# Patient Record
Sex: FEMALE | Race: WHITE | NOT HISPANIC OR LATINO | Employment: OTHER | RURAL
[De-identification: names, ages, dates, MRNs, and addresses within clinical notes are randomized per-mention and may not be internally consistent; named-entity substitution may affect disease eponyms.]

---

## 2017-02-15 ENCOUNTER — OFFICE VISIT (OUTPATIENT)
Dept: FAMILY MEDICINE CLINIC | Facility: CLINIC | Age: 77
End: 2017-02-15

## 2017-02-15 VITALS
HEART RATE: 78 BPM | BODY MASS INDEX: 26.59 KG/M2 | DIASTOLIC BLOOD PRESSURE: 80 MMHG | TEMPERATURE: 97.9 F | HEIGHT: 59 IN | OXYGEN SATURATION: 98 % | WEIGHT: 131.9 LBS | RESPIRATION RATE: 20 BRPM | SYSTOLIC BLOOD PRESSURE: 130 MMHG

## 2017-02-15 DIAGNOSIS — E78.5 HYPERLIPIDEMIA, UNSPECIFIED HYPERLIPIDEMIA TYPE: Chronic | ICD-10-CM

## 2017-02-15 DIAGNOSIS — R42 DIZZINESS: ICD-10-CM

## 2017-02-15 DIAGNOSIS — I10 ESSENTIAL HYPERTENSION: Chronic | ICD-10-CM

## 2017-02-15 DIAGNOSIS — IMO0002 UNCONTROLLED TYPE 2 DIABETES MELLITUS WITH COMPLICATION, WITH LONG-TERM CURRENT USE OF INSULIN: Primary | Chronic | ICD-10-CM

## 2017-02-15 DIAGNOSIS — E55.9 VITAMIN D DEFICIENCY: Chronic | ICD-10-CM

## 2017-02-15 PROCEDURE — 80053 COMPREHEN METABOLIC PANEL: CPT | Performed by: NURSE PRACTITIONER

## 2017-02-15 PROCEDURE — 83036 HEMOGLOBIN GLYCOSYLATED A1C: CPT | Performed by: NURSE PRACTITIONER

## 2017-02-15 PROCEDURE — 99214 OFFICE O/P EST MOD 30 MIN: CPT | Performed by: NURSE PRACTITIONER

## 2017-02-15 RX ORDER — MECLIZINE HYDROCHLORIDE 25 MG/1
12.5-25 TABLET ORAL 3 TIMES DAILY PRN
Qty: 90 TABLET | Refills: 0 | Status: SHIPPED | OUTPATIENT
Start: 2017-02-15

## 2017-02-15 NOTE — PROGRESS NOTES
Subjective   Brenda Garzon is a 76 y.o. female. She presents today for her routine follow up.  Has been doing well since her last visit.  She reports that she has experienced some dizzy spells when she wakes in the morning.  She has had some hypoglycemic episodes as well.    Diabetes   She presents for her follow-up diabetic visit. She has type 2 diabetes mellitus. Her disease course has been stable. Hypoglycemia symptoms include confusion and dizziness. Pertinent negatives for hypoglycemia include no headaches. There are no diabetic associated symptoms. Pertinent negatives for diabetes include no chest pain. There are no hypoglycemic complications. Symptoms are stable. There are no diabetic complications. Risk factors for coronary artery disease include diabetes mellitus, dyslipidemia, family history, post-menopausal, sedentary lifestyle and stress. Current diabetic treatment includes insulin injections and oral agent (dual therapy). She is compliant with treatment most of the time. She has not had a previous visit with a dietitian. She participates in exercise intermittently. An ACE inhibitor/angiotensin II receptor blocker is being taken. Eye exam is not current.        The following portions of the patient's history were reviewed and updated as appropriate: allergies, current medications, past family history, past medical history, past social history, past surgical history and problem list.    Review of Systems   Constitutional: Negative.    Respiratory: Negative.  Negative for shortness of breath.    Cardiovascular: Negative.  Negative for chest pain and palpitations.   Musculoskeletal: Negative for neck pain.   Skin: Negative.    Neurological: Positive for dizziness. Negative for headaches.   Psychiatric/Behavioral: Positive for confusion.       Objective   Physical Exam   Constitutional: She is oriented to person, place, and time. Vital signs are normal. She appears well-developed and well-nourished. No  distress.   HENT:   Head: Normocephalic.   Right Ear: External ear normal.   Left Ear: External ear normal.   Nose: Nose normal.   Mouth/Throat: Oropharynx is clear and moist.   Eyes: EOM are normal. Pupils are equal, round, and reactive to light.   Neck: Normal range of motion. Neck supple. No JVD present. No thyromegaly present.   Cardiovascular: Normal rate and regular rhythm.  Exam reveals no gallop and no friction rub.    No murmur heard.  Pulmonary/Chest: Effort normal and breath sounds normal. No respiratory distress. She has no wheezes. She has no rales.   Musculoskeletal: Normal range of motion.   Neurological: She is alert and oriented to person, place, and time.   Skin: Skin is warm and dry. No rash noted. She is not diaphoretic. No erythema. No pallor.   Psychiatric: She has a normal mood and affect. Her behavior is normal. Judgment and thought content normal.   Nursing note and vitals reviewed.      Assessment/Plan   Brenda was seen today for follow-up and diabetes.    Diagnoses and all orders for this visit:    Uncontrolled type 2 diabetes mellitus with complication, with long-term current use of insulin  -     Comprehensive Metabolic Panel  -     Hemoglobin A1c    Essential hypertension    Hyperlipidemia, unspecified hyperlipidemia type    Vitamin D deficiency    Dizziness  -     meclizine (ANTIVERT) 25 MG tablet; Take 0.5-1 tablets by mouth 3 (Three) Times a Day As Needed for dizziness.    Lab following office visit.  Antivert PRN dizziness.  Continue all current medications.  Follow up in 3 months for routine follow up.  Follow up sooner for problems/concerns.  Patient verbalized understanding and agreement with plan of care.

## 2017-02-16 LAB
ALBUMIN SERPL-MCNC: 3.8 G/DL (ref 3.4–4.8)
ALBUMIN/GLOB SERPL: 1.2 G/DL (ref 1.1–1.8)
ALP SERPL-CCNC: 101 U/L (ref 38–126)
ALT SERPL W P-5'-P-CCNC: 30 U/L (ref 9–52)
ANION GAP SERPL CALCULATED.3IONS-SCNC: 8 MMOL/L (ref 5–15)
AST SERPL-CCNC: 28 U/L (ref 14–36)
BILIRUB SERPL-MCNC: 0.5 MG/DL (ref 0.2–1.3)
BUN BLD-MCNC: 32 MG/DL (ref 7–21)
BUN/CREAT SERPL: 25.2 (ref 7–25)
CALCIUM SPEC-SCNC: 9.6 MG/DL (ref 8.4–10.2)
CHLORIDE SERPL-SCNC: 104 MMOL/L (ref 95–110)
CO2 SERPL-SCNC: 27 MMOL/L (ref 22–31)
CREAT BLD-MCNC: 1.27 MG/DL (ref 0.5–1)
GFR SERPL CREATININE-BSD FRML MDRD: 41 ML/MIN/1.73 (ref 39–90)
GLOBULIN UR ELPH-MCNC: 3.2 GM/DL (ref 2.3–3.5)
GLUCOSE BLD-MCNC: 60 MG/DL (ref 60–100)
HBA1C MFR BLD: 9.41 % (ref 4–5.6)
POTASSIUM BLD-SCNC: 4.8 MMOL/L (ref 3.5–5.1)
PROT SERPL-MCNC: 7 G/DL (ref 6.3–8.6)
SODIUM BLD-SCNC: 139 MMOL/L (ref 137–145)

## 2017-02-22 ENCOUNTER — TELEPHONE (OUTPATIENT)
Dept: FAMILY MEDICINE CLINIC | Facility: CLINIC | Age: 77
End: 2017-02-22

## 2017-02-22 RX ORDER — INSULIN GLARGINE 100 [IU]/ML
INJECTION, SOLUTION SUBCUTANEOUS
Qty: 45 ML | Refills: 1 | Status: SHIPPED | OUTPATIENT
Start: 2017-02-22 | End: 2017-08-01 | Stop reason: SDUPTHER

## 2017-02-22 NOTE — TELEPHONE ENCOUNTER
----- Message from CHEYENNE Maki sent at 2/16/2017  5:18 PM CST -----  Her A1C is 9.4 which indicates her diabetes is not controlled.  She indicated in the office that she was not taking her Janumet.  She really needs to be taking this.  Her last A1C was 7.5, so something has drastically changed.

## 2017-02-24 ENCOUNTER — TELEPHONE (OUTPATIENT)
Dept: FAMILY MEDICINE CLINIC | Facility: CLINIC | Age: 77
End: 2017-02-24

## 2017-02-27 ENCOUNTER — TELEPHONE (OUTPATIENT)
Dept: FAMILY MEDICINE CLINIC | Facility: CLINIC | Age: 77
End: 2017-02-27

## 2017-03-14 ENCOUNTER — TELEPHONE (OUTPATIENT)
Dept: FAMILY MEDICINE CLINIC | Facility: CLINIC | Age: 77
End: 2017-03-14

## 2017-03-14 DIAGNOSIS — R30.0 DYSURIA: Primary | ICD-10-CM

## 2017-03-14 NOTE — TELEPHONE ENCOUNTER
----- Message from CHEYENNE Maki sent at 3/14/2017 10:50 AM CDT -----  She needs to go to the lab and provide a urine specimen.  ----- Message -----     From: Ana Man MA     Sent: 3/14/2017   9:34 AM       To: CHEYENNE Maki    Pt called stated that she needs something for a bladder infection pain with urination. I informed the Pt that she may need to come in for a visit if needed.

## 2017-03-16 ENCOUNTER — TELEPHONE (OUTPATIENT)
Dept: FAMILY MEDICINE CLINIC | Facility: CLINIC | Age: 77
End: 2017-03-16

## 2017-03-16 DIAGNOSIS — N39.0 ACUTE UTI: Primary | ICD-10-CM

## 2017-03-16 LAB
BILIRUB UR QL STRIP: NEGATIVE
CLARITY UR: ABNORMAL
COLOR UR: ABNORMAL
GLUCOSE UR STRIP-MCNC: NEGATIVE MG/DL
HGB UR QL STRIP.AUTO: NEGATIVE
KETONES UR QL STRIP: NEGATIVE
LEUKOCYTE ESTERASE UR QL STRIP.AUTO: ABNORMAL
NITRITE UR QL STRIP: NEGATIVE
PH UR STRIP.AUTO: 5 [PH] (ref 5–8)
PROT UR QL STRIP: ABNORMAL
SP GR UR STRIP: 1.01 (ref 1–1.03)
UROBILINOGEN UR QL STRIP: ABNORMAL

## 2017-03-16 PROCEDURE — 87086 URINE CULTURE/COLONY COUNT: CPT | Performed by: NURSE PRACTITIONER

## 2017-03-16 PROCEDURE — 81003 URINALYSIS AUTO W/O SCOPE: CPT | Performed by: NURSE PRACTITIONER

## 2017-03-16 RX ORDER — SULFAMETHOXAZOLE AND TRIMETHOPRIM 400; 80 MG/1; MG/1
1 TABLET ORAL 2 TIMES DAILY
Qty: 14 TABLET | Refills: 0 | Status: SHIPPED | OUTPATIENT
Start: 2017-03-16 | End: 2017-03-23

## 2017-03-16 NOTE — TELEPHONE ENCOUNTER
----- Message from CHEYENNE Maki sent at 3/16/2017 11:04 AM CDT -----  She has a UTI.  Will send an antibiotic in for her.

## 2017-03-17 LAB — BACTERIA SPEC AEROBE CULT: NORMAL

## 2017-05-02 RX ORDER — LANCETS
EACH MISCELLANEOUS
Qty: 200 EACH | Refills: 3 | Status: SHIPPED | OUTPATIENT
Start: 2017-05-02 | End: 2018-04-23 | Stop reason: SDUPTHER

## 2017-05-02 RX ORDER — BLOOD SUGAR DIAGNOSTIC
STRIP MISCELLANEOUS
Qty: 200 EACH | Refills: 3 | Status: SHIPPED | OUTPATIENT
Start: 2017-05-02 | End: 2018-04-23 | Stop reason: SDUPTHER

## 2017-05-17 ENCOUNTER — OFFICE VISIT (OUTPATIENT)
Dept: FAMILY MEDICINE CLINIC | Facility: CLINIC | Age: 77
End: 2017-05-17

## 2017-05-17 VITALS
SYSTOLIC BLOOD PRESSURE: 140 MMHG | BODY MASS INDEX: 25.06 KG/M2 | TEMPERATURE: 98.7 F | WEIGHT: 124.3 LBS | RESPIRATION RATE: 20 BRPM | DIASTOLIC BLOOD PRESSURE: 80 MMHG | HEART RATE: 86 BPM | HEIGHT: 59 IN | OXYGEN SATURATION: 97 %

## 2017-05-17 DIAGNOSIS — R07.9 CHEST PAIN AT REST: Primary | ICD-10-CM

## 2017-05-17 DIAGNOSIS — I49.9 IRREGULAR HEART BEAT: ICD-10-CM

## 2017-05-17 DIAGNOSIS — I10 ESSENTIAL HYPERTENSION: Chronic | ICD-10-CM

## 2017-05-17 DIAGNOSIS — E55.9 VITAMIN D DEFICIENCY: Chronic | ICD-10-CM

## 2017-05-17 DIAGNOSIS — E78.5 DYSLIPIDEMIA: Chronic | ICD-10-CM

## 2017-05-17 DIAGNOSIS — IMO0002 UNCONTROLLED TYPE 2 DIABETES MELLITUS WITH COMPLICATION, WITH LONG-TERM CURRENT USE OF INSULIN: Chronic | ICD-10-CM

## 2017-05-17 DIAGNOSIS — Z12.31 ENCOUNTER FOR SCREENING MAMMOGRAM FOR BREAST CANCER: ICD-10-CM

## 2017-05-17 PROCEDURE — 93010 ELECTROCARDIOGRAM REPORT: CPT | Performed by: INTERNAL MEDICINE

## 2017-05-17 PROCEDURE — 99214 OFFICE O/P EST MOD 30 MIN: CPT | Performed by: NURSE PRACTITIONER

## 2017-05-17 PROCEDURE — 93005 ELECTROCARDIOGRAM TRACING: CPT | Performed by: NURSE PRACTITIONER

## 2017-05-22 ENCOUNTER — TELEPHONE (OUTPATIENT)
Dept: FAMILY MEDICINE CLINIC | Facility: CLINIC | Age: 77
End: 2017-05-22

## 2017-06-13 ENCOUNTER — OFFICE VISIT (OUTPATIENT)
Dept: FAMILY MEDICINE CLINIC | Facility: CLINIC | Age: 77
End: 2017-06-13

## 2017-06-13 VITALS
DIASTOLIC BLOOD PRESSURE: 70 MMHG | SYSTOLIC BLOOD PRESSURE: 140 MMHG | TEMPERATURE: 98.9 F | BODY MASS INDEX: 25.34 KG/M2 | WEIGHT: 125.7 LBS | OXYGEN SATURATION: 98 % | HEART RATE: 78 BPM | HEIGHT: 59 IN | RESPIRATION RATE: 20 BRPM

## 2017-06-13 DIAGNOSIS — Z78.0 POSTMENOPAUSAL STATE: Chronic | ICD-10-CM

## 2017-06-13 DIAGNOSIS — E55.9 VITAMIN D DEFICIENCY: Chronic | ICD-10-CM

## 2017-06-13 DIAGNOSIS — IMO0002 UNCONTROLLED TYPE 2 DIABETES MELLITUS WITH COMPLICATION, WITH LONG-TERM CURRENT USE OF INSULIN: Chronic | ICD-10-CM

## 2017-06-13 DIAGNOSIS — I10 ESSENTIAL HYPERTENSION: Primary | Chronic | ICD-10-CM

## 2017-06-13 DIAGNOSIS — E78.5 DYSLIPIDEMIA: Chronic | ICD-10-CM

## 2017-06-13 PROCEDURE — 99213 OFFICE O/P EST LOW 20 MIN: CPT | Performed by: NURSE PRACTITIONER

## 2017-06-13 NOTE — PROGRESS NOTES
Subjective   Brenda Garzon is a 76 y.o. female. She presents today for her routine follow up on chronic medical problems.  Reports that she has been doing well since her last visit.  BP is well controlled.    Hypertension   This is a chronic problem. The current episode started more than 1 year ago. The problem has been resolved since onset. The problem is controlled. Pertinent negatives include no anxiety, blurred vision, chest pain, headaches, malaise/fatigue, neck pain, orthopnea, palpitations, peripheral edema, PND, shortness of breath or sweats. Risk factors for coronary artery disease include diabetes mellitus, dyslipidemia, family history, obesity, sedentary lifestyle and post-menopausal state. Past treatments include angiotensin blockers. The current treatment provides significant improvement. Compliance problems include diet and exercise.         The following portions of the patient's history were reviewed and updated as appropriate: allergies, current medications, past family history, past medical history, past social history, past surgical history and problem list.    Review of Systems   Constitutional: Negative.  Negative for malaise/fatigue.   Eyes: Negative for blurred vision.   Respiratory: Negative.  Negative for shortness of breath.    Cardiovascular: Negative.  Negative for chest pain, palpitations, orthopnea and PND.   Musculoskeletal: Negative for neck pain.   Skin: Negative.    Neurological: Negative for headaches.       Objective   Physical Exam   Constitutional: She is oriented to person, place, and time. Vital signs are normal. She appears well-developed and well-nourished. No distress.   HENT:   Head: Normocephalic.   Right Ear: External ear normal.   Left Ear: External ear normal.   Nose: Nose normal.   Mouth/Throat: Oropharynx is clear and moist.   Eyes: EOM are normal. Pupils are equal, round, and reactive to light.   Neck: Normal range of motion. Neck supple. No JVD present. No  thyromegaly present.   Cardiovascular: Normal rate and regular rhythm.  Exam reveals no gallop and no friction rub.    No murmur heard.  Pulmonary/Chest: Effort normal and breath sounds normal. No respiratory distress. She has no wheezes. She has no rales.   Musculoskeletal: Normal range of motion.   Neurological: She is alert and oriented to person, place, and time.   Skin: Skin is warm and dry. No rash noted. She is not diaphoretic. No erythema. No pallor.   Psychiatric: She has a normal mood and affect. Her behavior is normal. Judgment and thought content normal.   Nursing note and vitals reviewed.      Assessment/Plan   Brneda was seen today for follow-up and diabetes.    Diagnoses and all orders for this visit:    Essential hypertension    Dyslipidemia    Postmenopausal state    Uncontrolled type 2 diabetes mellitus with complication, with long-term current use of insulin    Vitamin D deficiency  Continue current medications.  Follow up in 3 months for routine follow up.  Follow up sooner for problems/concerns.  Patient verbalized understanding and agreement with plan of care.

## 2017-06-26 ENCOUNTER — TELEPHONE (OUTPATIENT)
Dept: FAMILY MEDICINE CLINIC | Facility: CLINIC | Age: 77
End: 2017-06-26

## 2017-07-14 ENCOUNTER — OFFICE VISIT (OUTPATIENT)
Dept: CARDIOLOGY | Facility: CLINIC | Age: 77
End: 2017-07-14

## 2017-07-14 ENCOUNTER — APPOINTMENT (OUTPATIENT)
Dept: LAB | Facility: HOSPITAL | Age: 77
End: 2017-07-14

## 2017-07-14 VITALS
HEIGHT: 59 IN | WEIGHT: 125.5 LBS | BODY MASS INDEX: 25.3 KG/M2 | SYSTOLIC BLOOD PRESSURE: 160 MMHG | HEART RATE: 85 BPM | OXYGEN SATURATION: 99 % | DIASTOLIC BLOOD PRESSURE: 78 MMHG

## 2017-07-14 DIAGNOSIS — E78.2 MIXED HYPERLIPIDEMIA: ICD-10-CM

## 2017-07-14 DIAGNOSIS — R06.09 DYSPNEA ON EXERTION: ICD-10-CM

## 2017-07-14 DIAGNOSIS — R07.2 PRECORDIAL PAIN: Primary | ICD-10-CM

## 2017-07-14 DIAGNOSIS — R00.2 PALPITATIONS: ICD-10-CM

## 2017-07-14 LAB
DEPRECATED RDW RBC AUTO: 45.5 FL (ref 36.4–46.3)
ERYTHROCYTE [DISTWIDTH] IN BLOOD BY AUTOMATED COUNT: 15.4 % (ref 11.5–14.5)
HCT VFR BLD AUTO: 32.4 % (ref 35–45)
HGB BLD-MCNC: 10.9 G/DL (ref 12–15.5)
MCH RBC QN AUTO: 27 PG (ref 26.5–34)
MCHC RBC AUTO-ENTMCNC: 33.6 G/DL (ref 31.4–36)
MCV RBC AUTO: 80.4 FL (ref 80–98)
NT-PROBNP SERPL-MCNC: 238 PG/ML (ref 0–1800)
PLATELET # BLD AUTO: 278 10*3/MM3 (ref 150–450)
PMV BLD AUTO: 11.9 FL (ref 8–12)
RBC # BLD AUTO: 4.03 10*6/MM3 (ref 3.77–5.16)
WBC NRBC COR # BLD: 7.75 10*3/MM3 (ref 3.2–9.8)

## 2017-07-14 PROCEDURE — 85027 COMPLETE CBC AUTOMATED: CPT | Performed by: INTERNAL MEDICINE

## 2017-07-14 PROCEDURE — 99204 OFFICE O/P NEW MOD 45 MIN: CPT | Performed by: INTERNAL MEDICINE

## 2017-07-14 PROCEDURE — 36415 COLL VENOUS BLD VENIPUNCTURE: CPT | Performed by: INTERNAL MEDICINE

## 2017-07-14 PROCEDURE — 83880 ASSAY OF NATRIURETIC PEPTIDE: CPT | Performed by: INTERNAL MEDICINE

## 2017-07-14 NOTE — PROGRESS NOTES
Cardiovascular Medicine      Tomer Yousif M.D., Ph.D., [facility]         Jodie Aguilera, APRN  [address]  [place] [address]  Thank you for asking me to see Brenda Garzon for chest pain and palpitations.    History of Present Illness  This is a 76 y.o. female with:    1. CP  2. Palpitations  3.  Hypertension  4.  Hyperlipidemia  5.  Type 2 diabetes  6. Dyspnea    Chest pain/Dyspnea/Palpitations  Patient's referred by her PCP for increased cardiac awareness and chest pain complaints.  The patient recently had a period of non--exertional chest pain.  She states she had a chest pain that occurred ball she was sitting in her car.  There was no exertional complaints.  There is a family history of MI in her father, but not premature.  She denies any personal history of MI, CHF or known coronary artery disease.  She tells me that she's never had an ischemia evaluation.  She has no exertional chest pain complaints.  She's had no other associated symptoms except intermittent palpitations.  She describes these as sensations of irregularity.  No sustained episodes.  No aggravating or alleviating factors.  She's had no dizziness, lightheadedness or effort syncope. She also tells me she has more fatigue than normal and dyspnea. The dyspnea is exertional. No resting symptoms. She has a non-productive cough. She is a lifelong non-smoker, but she has extensive secondhand smoke exposure.     HLD  Concerning the patient's hyperlipidemia, the patient remains on a statin.  They are tolerating this very well.  Denies side effects.  Specifically, the patient denies any prior history of asymptomatic LFT elevation, myositis or myalgias.  Patient's laboratory evaluations are followed closely by their PCP.      Review of Systems - General ROS: negative  Respiratory ROS: positive for - cough  Cardiovascular ROS: positive for - chest pain and palpitations.  All other systems were reviewed and were negative.    family  history includes Diabetes in her brother and sister; Heart attack in her father; Heart disease in her father; Hypertension in her mother; Stroke in her mother.     reports that she has never smoked. She has never used smokeless tobacco. She reports that she does not drink alcohol or use illicit drugs.    Allergies   Allergen Reactions   • Codeine          Current Outpatient Prescriptions:   •  ACCU-CHEK YUSRA PLUS test strip, Use as directed, Disp: 200 each, Rfl: 3  •  ACCU-CHEK FASTCLIX LANCETS misc, Use as directed, Disp: 200 each, Rfl: 3  •  aspirin 81 MG tablet, Take 81 mg by mouth daily., Disp: , Rfl:   •  atorvastatin (LIPITOR) 20 MG tablet, Take 1 tablet by mouth Every Night., Disp: 90 tablet, Rfl: 3  •  diclofenac (VOLTAREN) 75 MG EC tablet, Take 1 tablet by mouth 2 (Two) Times a Day., Disp: 180 tablet, Rfl: 3  •  Ergocalciferol (VITAMIN D2 PO), Take 50,000 Units by mouth 1 (one) time per week., Disp: , Rfl:   •  gabapentin (NEURONTIN) 300 MG capsule, Take 1 capsule by mouth Every Night., Disp: 90 capsule, Rfl: 3  •  LANTUS SOLOSTAR 100 UNIT/ML injection pen, Inject subcutaneously 40  units at bedtime, Disp: 45 mL, Rfl: 1  •  losartan (COZAAR) 50 MG tablet, Take 1 tablet by mouth 2 (Two) Times a Day., Disp: 180 tablet, Rfl: 3  •  meclizine (ANTIVERT) 25 MG tablet, Take 0.5-1 tablets by mouth 3 (Three) Times a Day As Needed for dizziness., Disp: 90 tablet, Rfl: 0  •  metaxalone (SKELAXIN) 800 MG tablet, Take 400-800 mg by mouth 3 (three) times a day as needed for muscle spasms., Disp: , Rfl:   •  omeprazole (priLOSEC) 40 MG capsule, Take 1 capsule by mouth Daily., Disp: 90 capsule, Rfl: 3  •  sitaGLIPtin-metFORMIN (JANUMET)  MG per tablet, Take 1 tablet by mouth 2 (Two) Times a Day., Disp: 180 tablet, Rfl: 3    Physical Exam:  Vitals:    07/14/17 1023   BP: 160/78   Pulse:    SpO2:      Body mass index is 25.35 kg/(m^2).    GEN: alert, appears stated age and cooperative  Body Habitus:  well-nourished  Neuro: CN II-XII grossly intact.   HEENT: Head: Normocephalic, no lesions, without obvious abnormality.  Neck / Thyroid: Supple, no masses, nodes, nodules or enlargement. No arcus senilis, xanthelasma or xanthomas. PERRL. Normal external ears. No drainage. No thyromegaly. Neck supple. No LAD. Trachea midline. Nose, normal.  JVP: 6 cm of water at 45 degrees HJR: absent      Carotid:  Upstroke: easily palpated bilaterally Volume: Normal.    Carotid Bruit:  None  Subclavian Bruit: Not present.    Lymph: No overt LAD.   Back: Normal.  Chest:  Normal Excursion: Good    I:E: Good  Pulmonary:clear to auscultation, no wheezes, rales or rhonchi, symmetric air entry. Equal chest excursion. Chest physical exam is normal. No tenderness.        Precordium:  No palpable heaves or thrusts. P2 is not palpable.   [place]:  normal size and placement Palpable S4: Not present.   Heart rate: normal  Heart Rhythm: regular     Heart Sounds: S1: normal intensity  S2: increased A2  S3: absent   S4: absent  Opening Snap: absent  A2-OS:  N/A  Pericardial rub: absent    Ejection click: None      Murmurs: Systolic: none  Diastolic: none  Abdomen: Soft, non-tender, normal bowel sounds; no bruits, organomegaly or masses.  Extremity: no edema, cyanosis  Pulses: Right radial artery has 2+ (normal) pulse and Left radial artery has 2+ (normal) pulse    DATA REVIEWED:   EKG dated May 17, 2017 shows normal sinus mechanism    EXAM: Radiograph(s), Chest   VIEWS: PA / Lat ; 2   DATE/TIME: 5/17/2017 3:18 PM CDT       INDICATION: Chest pain   COMPARISON: CXR: none      FINDINGS:       - lines/tubes: none   - cardiac: size within normal limits   - mediastinum: contour within normal limits   - lungs: no focal air space process, pulmonary  interstitial edema, nodule(s)/mass   - pleura: no evidence of fluid   - osseous: unremarkable for age   - misc.: Moderate-sized hiatal hernia.          IMPRESSION:  CONCLUSION:       1. No evidence of an acute  cardiopulmonary process.       Hemoglobin A1C 4 - 5.6 % 9.41 (H)     Total Cholesterol 0 - 199 mg/dl 194   Comments: CHOL DESIRED: < 200 MG/DL   Triglycerides 20 - 199 mg/dl 161   Comments: TRIG DESIRED: < 200 MG/DL   HDL Cholesterol 60 - 200 mg/dl 36 (L)   Comments: HDL AVERAGE RISK: 35 - 60 MG/DL   LDL Cholesterol  0 - 129 mg/dl 126   Comments: Calculated LDL results only valid if Triglycerides are < 400 mg/dL.   LDL DESIRED: < 130 MG/DL      Resulting Agency  [facility]     TTE, 2009: normal  MPS, 2009: low-risk    Assessment/Plan     1. Chest pain syndrome and dyspnea. The pain complaints are atypical.  The patient's risk factors for ASCAD include: Diabetes Mellitus, Hyperlipidemia, Age, HTN, [place] Risk Score > 20%. The patient is Moderate Risk.  An ischemia evaluation is indicated. The patient is not able to exercise. Reason for inability to exercise: respiratory condition(s): exertional dyspnea. EKG is Normal.   -Risks/Benefits discussed.   -A hand out was provided on the type of stress test. Questions answered.   -I have asked the patient to call 911 or be seen in the ED for further CP complaints.   -[facility], TTE.  -PFTs and pulm referral    2. Palpitations, uncertain etiology at this time, warranting further investigation. Will need to exclude a significant arrhythmia.  -Discussed the benign nature of the majority of causes of palpitations.   -TTE to evaluate for structural heart disease  -Holter monitor 24 hours    3. Cardiac Risk Assessment and need for statin therapy: Moderate Risk.   -Statin:  Yes.  -Lipid-lowering medications: Lipitor (atorvastatin)  -Recommended ASA    4. Tobacco status: Never smoker.    5. HTN, essential.  BP is not at goal  - follow-up PCP for mgmt.    Plan for follow-up: 1 month          This document has been electronically signed by Tomer Yousif MD PhD on July 14, 2017 10:27 AM

## 2017-07-28 ENCOUNTER — HOSPITAL ENCOUNTER (OUTPATIENT)
Dept: PULMONOLOGY | Facility: HOSPITAL | Age: 77
Discharge: HOME | End: 2017-07-28
Attending: INTERNAL MEDICINE | Admitting: INTERNAL MEDICINE

## 2017-07-28 PROCEDURE — 94727 GAS DIL/WSHOT DETER LNG VOL: CPT

## 2017-07-28 PROCEDURE — 94010 BREATHING CAPACITY TEST: CPT | Performed by: INTERNAL MEDICINE

## 2017-07-28 PROCEDURE — 94729 DIFFUSING CAPACITY: CPT | Performed by: INTERNAL MEDICINE

## 2017-07-28 PROCEDURE — 94729 DIFFUSING CAPACITY: CPT

## 2017-07-28 PROCEDURE — 94727 GAS DIL/WSHOT DETER LNG VOL: CPT | Performed by: INTERNAL MEDICINE

## 2017-07-28 PROCEDURE — 94010 BREATHING CAPACITY TEST: CPT

## 2017-08-01 RX ORDER — INSULIN GLARGINE 100 [IU]/ML
INJECTION, SOLUTION SUBCUTANEOUS
Qty: 45 ML | Refills: 0 | Status: SHIPPED | OUTPATIENT
Start: 2017-08-01 | End: 2017-08-22 | Stop reason: SDUPTHER

## 2017-08-03 ENCOUNTER — OFFICE VISIT (OUTPATIENT)
Dept: PULMONOLOGY | Facility: CLINIC | Age: 77
End: 2017-08-03

## 2017-08-03 VITALS
HEART RATE: 51 BPM | DIASTOLIC BLOOD PRESSURE: 80 MMHG | OXYGEN SATURATION: 98 % | SYSTOLIC BLOOD PRESSURE: 180 MMHG | HEIGHT: 59 IN | BODY MASS INDEX: 25.96 KG/M2 | WEIGHT: 128.8 LBS

## 2017-08-03 DIAGNOSIS — I10 ESSENTIAL HYPERTENSION: Chronic | ICD-10-CM

## 2017-08-03 DIAGNOSIS — K21.9 GASTROESOPHAGEAL REFLUX DISEASE, ESOPHAGITIS PRESENCE NOT SPECIFIED: ICD-10-CM

## 2017-08-03 DIAGNOSIS — R94.2 ABNORMAL DIFFUSION CAPACITY DETERMINED BY PULMONARY FUNCTION TEST: ICD-10-CM

## 2017-08-03 DIAGNOSIS — R00.1 BRADYCARDIA WITH 51-60 BEATS PER MINUTE: ICD-10-CM

## 2017-08-03 DIAGNOSIS — R06.09 DYSPNEA ON EXERTION: Primary | ICD-10-CM

## 2017-08-03 PROBLEM — E78.5 HYPERLIPIDEMIA: Status: ACTIVE | Noted: 2017-08-03

## 2017-08-03 PROCEDURE — 99204 OFFICE O/P NEW MOD 45 MIN: CPT | Performed by: INTERNAL MEDICINE

## 2017-08-03 NOTE — PROGRESS NOTES
Pulmonary Consultation    Subjective     Chief Complaint   Patient presents with   • Shortness of Breath     ref by Nica        History of Present Illness  Brenda Garzon is a 76 y.o. female with a PMH significant for HTN, HLD, and DM who presents for evaluation of dyspnea. Pt states she was having chest pain and was evaluated by Dr. Yousif who referred her to me. She denies dyspnea currently, but states she was having GUADALUPE when she would walk in her back yard last month. Her dyspnea has now resolved. She denies any triggers around that time. Pt denies recurrent chest pain. She reports she was having left ear pain and had a presyncopal episodes. She denies prior diagnosis of lung disease. Pt denies wt changes or edema. She does have issues with GERD which is not full controlled with prilosec. Pt occasionally has seasonal allergies, abut her symptoms have resolved. Pt is a never smoker, but she was exposed to 2nd hand smoke. She previously worked in a chicken house and as a . Her cousin has lung disease and was told he needed a lung transplant. Her daughter also has what sounds like COPD.     Review of Systems: History obtained from chart review and the patient.  Review of Systems   Constitutional: Negative for unexpected weight change.   Respiratory: Negative for cough, shortness of breath and wheezing.    Cardiovascular: Negative for chest pain.   Gastrointestinal:        Heartburn     As described in the HPI. Otherwise, remainder of ROS (14 systems) were negative.    Patient Active Problem List   Diagnosis   • Essential hypertension   • Vitamin D deficiency   • Type 2 diabetes mellitus, uncontrolled   • Thyroid nodule   • Colon polyp   • Postmenopausal state   • Dyslipidemia   • Hyperlipidemia         Current Outpatient Prescriptions:   •  ACCU-CHEK YUSRA PLUS test strip, Use as directed, Disp: 200 each, Rfl: 3  •  ACCU-CHEK FASTCLIX LANCETS misc, Use as directed, Disp: 200 each, Rfl: 3  •   aspirin 81 MG tablet, Take 81 mg by mouth daily., Disp: , Rfl:   •  atorvastatin (LIPITOR) 20 MG tablet, Take 1 tablet by mouth Every Night., Disp: 90 tablet, Rfl: 3  •  diclofenac (VOLTAREN) 75 MG EC tablet, Take 1 tablet by mouth 2 (Two) Times a Day., Disp: 180 tablet, Rfl: 3  •  Ergocalciferol (VITAMIN D2 PO), Take 50,000 Units by mouth 1 (one) time per week., Disp: , Rfl:   •  gabapentin (NEURONTIN) 300 MG capsule, Take 1 capsule by mouth Every Night., Disp: 90 capsule, Rfl: 3  •  LANTUS SOLOSTAR 100 UNIT/ML injection pen, Inject subcutaneously 40  units at bedtime, Disp: 45 mL, Rfl: 0  •  losartan (COZAAR) 50 MG tablet, Take 1 tablet by mouth 2 (Two) Times a Day., Disp: 180 tablet, Rfl: 3  •  meclizine (ANTIVERT) 25 MG tablet, Take 0.5-1 tablets by mouth 3 (Three) Times a Day As Needed for dizziness., Disp: 90 tablet, Rfl: 0  •  metaxalone (SKELAXIN) 800 MG tablet, Take 400-800 mg by mouth 3 (three) times a day as needed for muscle spasms., Disp: , Rfl:   •  omeprazole (priLOSEC) 40 MG capsule, Take 1 capsule by mouth Daily., Disp: 90 capsule, Rfl: 3  •  sitaGLIPtin-metFORMIN (JANUMET)  MG per tablet, Take 1 tablet by mouth 2 (Two) Times a Day., Disp: 180 tablet, Rfl: 3    Past Medical History:   Diagnosis Date   • Chest pain    • Essential hypertension    • Essential hypertension     unspecified   • GERD (gastroesophageal reflux disease)    • Hiatal hernia    • Hyperlipidemia    • Hypertensive disorder    • Irregular heartbeat    • Multinodular goiter    • Neck pain    • Neurologic disorder associated with diabetes mellitus    • Postmenopausal state    • Thyroid nodule    • Type 2 diabetes mellitus    • Type 2 diabetes mellitus, uncontrolled    • Urinary tract infectious disease    • Vitamin D deficiency      Past Surgical History:   Procedure Laterality Date   • COLONOSCOPY  11/08/2013    1 polyp in ascending colon; removed by snare cautery polypectomy. Internal & external hemorrhoids found.   • LEG  "SURGERY Right    • PARTIAL HYSTERECTOMY     • UPPER GASTROINTESTINAL ENDOSCOPY  11/08/2013    Esophageal stricture present. Dilatation performed. Hiatus hernia in esophaugs. Gastritis in stomach. Biopsy taken. Normal duodenum. Biopsy taken.     Social History     Social History   • Marital status:      Spouse name: N/A   • Number of children: N/A   • Years of education: N/A     Social History Main Topics   • Smoking status: Never Smoker   • Smokeless tobacco: Never Used   • Alcohol use No   • Drug use: No   • Sexual activity: Defer     Other Topics Concern   • None     Social History Narrative     Family History   Problem Relation Age of Onset   • Hypertension Mother    • Stroke Mother    • Heart attack Father    • Heart disease Father    • Diabetes Sister    • Diabetes Brother           Objective     Blood pressure 180/80, pulse 51, height 59\" (149.9 cm), weight 128 lb 12.8 oz (58.4 kg), SpO2 98 %, not currently breastfeeding.  Physical Exam   Constitutional: She is oriented to person, place, and time. Vital signs are normal. She appears well-developed and well-nourished.   HENT:   Head: Normocephalic and atraumatic.   Nose: Nose normal.   Mouth/Throat: Uvula is midline, oropharynx is clear and moist and mucous membranes are normal.   Mallampati 3   Eyes: Conjunctivae, EOM and lids are normal. Pupils are equal, round, and reactive to light.   Neck: Trachea normal and normal range of motion. No tracheal tenderness present. No thyroid mass present.   Cardiovascular: Regular rhythm and normal heart sounds.  Bradycardia present.  Exam reveals no gallop.    No murmur heard.  Pulmonary/Chest: Effort normal and breath sounds normal. She has no decreased breath sounds. She has no wheezes. She has no rhonchi. Chest wall is not dull to percussion. She exhibits deformity (kyphosis). She exhibits no tenderness.   Abdominal: Soft. Normal appearance and bowel sounds are normal. There is no tenderness.       Vascular " Status -  Her exam exhibits no right foot edema. Her exam exhibits no left foot edema.  Lymphadenopathy:        Head (right side): No submandibular adenopathy present.        Head (left side): No submandibular adenopathy present.     She has no cervical adenopathy.        Right: No supraclavicular adenopathy present.        Left: No supraclavicular adenopathy present.   Neurological: She is alert and oriented to person, place, and time.   Skin: Skin is warm and dry. No cyanosis. Nails show no clubbing.   Psychiatric: She has a normal mood and affect. Her speech is normal and behavior is normal. Judgment normal.   Nursing note and vitals reviewed.      PFTs: 7/28/17  Ratio 80  FVC 2/88%  FEV1 1.61/90%  TLC 3.48/92%  DLCO 9.2/59%  Normal spirometry and lung volumes.  Moderately reduced diffusing capacity.  No comparative data available.     Radiology (independently reviewed and interpreted by me): CXR 5/17/17 showed NACPD.       Assessment/Plan     Brenda was seen today for shortness of breath.    Diagnoses and all orders for this visit:    Dyspnea on exertion    Abnormal diffusion capacity determined by pulmonary function test    Essential hypertension    Bradycardia with 51-60 beats per minute    Gastroesophageal reflux disease, esophagitis presence not specified         Discussion/ Recommendations:   PFTs were only significant for an isolated diffusion defect which may be accounted for by the patient's underlying thoracic kyphosis as the DL/VA was normal.  Her dyspnea and chest pain have since resolved.  She was noted to be hypertensive and mildly bradycardic here in the office today, but is asymptomatic.  She may be having some issues with this on exertion which should be evident on the stress test set as planned.    -No indication for bronchodilators.  -No further workup of dyspnea as it has resolved.  -Encouraged patient to monitor blood pressure/heart rate and follow-up with Dr. Yousif as recommended.  -Agree  with plans for stress test as scheduled.  -If she continues to have issues with heartburn despite omeprazole, recommend discussing with her PCP and considering GI evaluation.       Return if symptoms worsen or fail to improve.      Thank you for allowing me to participate in the care of Bernda Garzon. Please do not hesitate to contact me with any questions.         This document has been electronically signed by Samantha Espinal MD on August 3, 2017 9:45 AM      Dictated using Dragon

## 2017-08-07 LAB
BH CV ECHO MEAS - ACS: 1.5 CM
BH CV ECHO MEAS - AI DEC SLOPE: 128 CM/SEC^2
BH CV ECHO MEAS - AI MAX PG: 82.1 MMHG
BH CV ECHO MEAS - AI MAX VEL: 453 CM/SEC
BH CV ECHO MEAS - AI P1/2T: 1037 MSEC
BH CV ECHO MEAS - AO MAX PG (FULL): 9.8 MMHG
BH CV ECHO MEAS - AO MAX PG: 14.6 MMHG
BH CV ECHO MEAS - AO MEAN PG (FULL): 6 MMHG
BH CV ECHO MEAS - AO MEAN PG: 9 MMHG
BH CV ECHO MEAS - AO ROOT AREA: 4.9 CM^2
BH CV ECHO MEAS - AO ROOT DIAM: 2.5 CM
BH CV ECHO MEAS - AO V2 MAX: 191 CM/SEC
BH CV ECHO MEAS - AO V2 MEAN: 138 CM/SEC
BH CV ECHO MEAS - AO V2 VTI: 52 CM
BH CV ECHO MEAS - AVA(I,A): 1.7 CM^2
BH CV ECHO MEAS - AVA(I,D): 1.7 CM^2
BH CV ECHO MEAS - AVA(V,A): 1.8 CM^2
BH CV ECHO MEAS - AVA(V,D): 1.8 CM^2
BH CV ECHO MEAS - EDV(CUBED): 97.3 ML
BH CV ECHO MEAS - EDV(TEICH): 97.3 ML
BH CV ECHO MEAS - EF(CUBED): 77.4 %
BH CV ECHO MEAS - EF(TEICH): 69.6 %
BH CV ECHO MEAS - ESV(CUBED): 22 ML
BH CV ECHO MEAS - ESV(TEICH): 29.6 ML
BH CV ECHO MEAS - FS: 39.1 %
BH CV ECHO MEAS - IVS/LVPW: 1.1
BH CV ECHO MEAS - IVSD: 1 CM
BH CV ECHO MEAS - LA DIMENSION: 5.2 CM
BH CV ECHO MEAS - LA/AO: 2.1
BH CV ECHO MEAS - LV MASS(C)D: 148.1 GRAMS
BH CV ECHO MEAS - LV MAX PG: 4.8 MMHG
BH CV ECHO MEAS - LV MEAN PG: 3 MMHG
BH CV ECHO MEAS - LV V1 MAX: 109 CM/SEC
BH CV ECHO MEAS - LV V1 MEAN: 74.2 CM/SEC
BH CV ECHO MEAS - LV V1 VTI: 28.4 CM
BH CV ECHO MEAS - LVIDD: 4.6 CM
BH CV ECHO MEAS - LVIDS: 2.8 CM
BH CV ECHO MEAS - LVOT AREA (M): 3.1 CM^2
BH CV ECHO MEAS - LVOT AREA: 3.1 CM^2
BH CV ECHO MEAS - LVOT DIAM: 2 CM
BH CV ECHO MEAS - LVPWD: 0.9 CM
BH CV ECHO MEAS - MR MAX PG: 129.5 MMHG
BH CV ECHO MEAS - MR MAX VEL: 569 CM/SEC
BH CV ECHO MEAS - MV A MAX VEL: 131 CM/SEC
BH CV ECHO MEAS - MV DEC SLOPE: 454 CM/SEC^2
BH CV ECHO MEAS - MV E MAX VEL: 129 CM/SEC
BH CV ECHO MEAS - MV E/A: 0.98
BH CV ECHO MEAS - MV P1/2T MAX VEL: 144 CM/SEC
BH CV ECHO MEAS - MV P1/2T: 92.9 MSEC
BH CV ECHO MEAS - MVA P1/2T LCG: 1.5 CM^2
BH CV ECHO MEAS - MVA(P1/2T): 2.4 CM^2
BH CV ECHO MEAS - PA MAX PG: 2.4 MMHG
BH CV ECHO MEAS - PA V2 MAX: 77 CM/SEC
BH CV ECHO MEAS - RAP SYSTOLE: 5 MMHG
BH CV ECHO MEAS - RVDD: 2.7 CM
BH CV ECHO MEAS - RVSP: 42.2 MMHG
BH CV ECHO MEAS - SV(AO): 255.3 ML
BH CV ECHO MEAS - SV(CUBED): 75.4 ML
BH CV ECHO MEAS - SV(LVOT): 89.2 ML
BH CV ECHO MEAS - SV(TEICH): 67.8 ML
BH CV ECHO MEAS - TR MAX VEL: 305 CM/SEC

## 2017-08-14 ENCOUNTER — HOSPITAL ENCOUNTER (OUTPATIENT)
Dept: NUCLEAR MEDICINE | Facility: HOSPITAL | Age: 77
Discharge: HOME | End: 2017-08-14
Attending: INTERNAL MEDICINE

## 2017-08-14 ENCOUNTER — HOSPITAL ENCOUNTER (OUTPATIENT)
Dept: CARDIOLOGY | Facility: HOSPITAL | Age: 77
Discharge: HOME | End: 2017-08-14
Attending: INTERNAL MEDICINE

## 2017-08-14 PROCEDURE — 0 TECHNETIUM SESTAMIBI: Performed by: INTERNAL MEDICINE

## 2017-08-14 PROCEDURE — 78452 HT MUSCLE IMAGE SPECT MULT: CPT | Performed by: INTERNAL MEDICINE

## 2017-08-14 PROCEDURE — 93017 CV STRESS TEST TRACING ONLY: CPT

## 2017-08-14 PROCEDURE — A9500 TC99M SESTAMIBI: HCPCS | Performed by: INTERNAL MEDICINE

## 2017-08-14 PROCEDURE — 93018 CV STRESS TEST I&R ONLY: CPT | Performed by: INTERNAL MEDICINE

## 2017-08-14 PROCEDURE — 25010000002 REGADENOSON 0.4 MG/5ML SOLUTION: Performed by: INTERNAL MEDICINE

## 2017-08-14 PROCEDURE — 93016 CV STRESS TEST SUPVJ ONLY: CPT | Performed by: INTERNAL MEDICINE

## 2017-08-14 PROCEDURE — 78452 HT MUSCLE IMAGE SPECT MULT: CPT

## 2017-08-14 RX ORDER — 0.9 % SODIUM CHLORIDE 0.9 %
10 VIAL (ML) INJECTION AS NEEDED
Status: DISCONTINUED | OUTPATIENT
Start: 2017-08-14 | End: 2017-08-15 | Stop reason: HOSPADM

## 2017-08-14 RX ADMIN — TECHNETIUM TC-99M SESTAMIBI 1 DOSE: 1 INJECTION INTRAVENOUS at 08:37

## 2017-08-14 RX ADMIN — SODIUM CHLORIDE 10 ML: 9 INJECTION INTRAMUSCULAR; INTRAVENOUS; SUBCUTANEOUS at 10:25

## 2017-08-14 RX ADMIN — REGADENOSON 0.4 MG: 0.08 INJECTION, SOLUTION INTRAVENOUS at 10:25

## 2017-08-14 RX ADMIN — TECHNETIUM TC-99M SESTAMIBI 1 DOSE: 1 INJECTION INTRAVENOUS at 10:25

## 2017-08-15 LAB
BH CV STRESS BP STAGE 1: NORMAL
BH CV STRESS COMMENTS STAGE 1: NORMAL
BH CV STRESS DOSE REGADENOSON STAGE 1: 0.4
BH CV STRESS DURATION MIN STAGE 1: 0
BH CV STRESS DURATION SEC STAGE 1: 10
BH CV STRESS HR STAGE 1: 74
BH CV STRESS PROTOCOL 1: NORMAL
BH CV STRESS RECOVERY BP: 93 MMHG
BH CV STRESS RECOVERY HR: 93 BPM
BH CV STRESS STAGE 1: 1
LV EF NUC BP: 70 %
MAXIMAL PREDICTED HEART RATE: 144 BPM
PERCENT MAX PREDICTED HR: 71.53 %
STRESS BASELINE BP: NORMAL MMHG
STRESS BASELINE HR: 74 BPM
STRESS PERCENT HR: 84 %
STRESS POST ESTIMATED WORKLOAD: 1 METS
STRESS POST PEAK BP: NORMAL MMHG
STRESS POST PEAK HR: 103 BPM
STRESS TARGET HR: 122 BPM

## 2017-08-23 RX ORDER — INSULIN GLARGINE 100 [IU]/ML
INJECTION, SOLUTION SUBCUTANEOUS
Qty: 45 ML | Refills: 1 | Status: SHIPPED | OUTPATIENT
Start: 2017-08-23 | End: 2017-10-10

## 2017-08-23 RX ORDER — LOSARTAN POTASSIUM 50 MG/1
TABLET ORAL
Qty: 180 TABLET | Refills: 1 | Status: SHIPPED | OUTPATIENT
Start: 2017-08-23 | End: 2018-04-18 | Stop reason: SINTOL

## 2017-08-23 RX ORDER — DICLOFENAC SODIUM 75 MG/1
TABLET, DELAYED RELEASE ORAL
Qty: 180 TABLET | Refills: 1 | Status: SHIPPED | OUTPATIENT
Start: 2017-08-23 | End: 2018-06-12 | Stop reason: SINTOL

## 2017-08-23 RX ORDER — ATORVASTATIN CALCIUM 20 MG/1
TABLET, FILM COATED ORAL
Qty: 90 TABLET | Refills: 1 | Status: SHIPPED | OUTPATIENT
Start: 2017-08-23 | End: 2018-07-11 | Stop reason: SDUPTHER

## 2017-08-23 RX ORDER — OMEPRAZOLE 40 MG/1
CAPSULE, DELAYED RELEASE ORAL
Qty: 90 CAPSULE | Refills: 1 | Status: SHIPPED | OUTPATIENT
Start: 2017-08-23 | End: 2018-04-22 | Stop reason: SDUPTHER

## 2017-09-07 ENCOUNTER — TELEPHONE (OUTPATIENT)
Dept: FAMILY MEDICINE CLINIC | Facility: CLINIC | Age: 77
End: 2017-09-07

## 2017-09-07 NOTE — TELEPHONE ENCOUNTER
Pt daughter called stating pt was found unresponsive at her home on Tuesday.  Paramedics were called, had to give her 2 cubes of glucose to arouse her.  Pt daughter states pt looked like she was in a diabetic coma.  Pt did not go to ER.  Daughter says was told pt needed glucose at home at all times.  Asking for Rx.  Did let daughter know you were out of the office today.  Would let know next day.  Advised did need to schedule appt as she was to be seen in three months, nothing scheduled at this time. Also told daughter may not be able to get RX until pt was seen.

## 2017-09-08 DIAGNOSIS — E16.2 HYPOGLYCEMIA: Primary | ICD-10-CM

## 2017-09-12 ENCOUNTER — OFFICE VISIT (OUTPATIENT)
Dept: FAMILY MEDICINE CLINIC | Facility: CLINIC | Age: 77
End: 2017-09-12

## 2017-09-12 VITALS
SYSTOLIC BLOOD PRESSURE: 132 MMHG | OXYGEN SATURATION: 98 % | WEIGHT: 133.3 LBS | TEMPERATURE: 97.7 F | RESPIRATION RATE: 20 BRPM | HEIGHT: 59 IN | DIASTOLIC BLOOD PRESSURE: 80 MMHG | BODY MASS INDEX: 26.87 KG/M2 | HEART RATE: 53 BPM

## 2017-09-12 DIAGNOSIS — IMO0002 UNCONTROLLED TYPE 2 DIABETES MELLITUS WITH COMPLICATION, WITH LONG-TERM CURRENT USE OF INSULIN: Chronic | ICD-10-CM

## 2017-09-12 DIAGNOSIS — E55.9 VITAMIN D DEFICIENCY: Chronic | ICD-10-CM

## 2017-09-12 DIAGNOSIS — I10 ESSENTIAL HYPERTENSION: Primary | Chronic | ICD-10-CM

## 2017-09-12 DIAGNOSIS — E04.1 THYROID NODULE: Chronic | ICD-10-CM

## 2017-09-12 DIAGNOSIS — E78.5 DYSLIPIDEMIA: Chronic | ICD-10-CM

## 2017-09-12 PROCEDURE — 99214 OFFICE O/P EST MOD 30 MIN: CPT | Performed by: NURSE PRACTITIONER

## 2017-09-12 NOTE — PATIENT INSTRUCTIONS
Hypoglycemia  Hypoglycemia is when the sugar (glucose) level in the blood is too low. Symptoms of low blood sugar may include:  · Feeling:    Hungry.    Worried or nervous (anxious).    Sweaty and clammy.    Confused.    Dizzy.    Sleepy.    Sick to your stomach (nauseous).  · Having:    A fast heartbeat.    A headache.    A change in your vision.    Jerky movements that you cannot control (seizure).    Nightmares.    Tingling or no feeling (numbness) around the mouth, lips, or tongue.  · Having trouble with:    Talking.    Paying attention (concentrating).    Moving (coordination).    Sleeping.  · Shaking.  · Passing out (fainting).  · Getting upset easily (irritability).  Low blood sugar can happen to people who have diabetes and people who do not have diabetes. Low blood sugar can happen quickly, and it can be an emergency.  Treating Low Blood Sugar  Low blood sugar is often treated by eating or drinking something sugary right away. If you can think clearly and swallow safely, follow the 15:15 rule:  · Take 15 grams of a fast-acting carb (carbohydrate). Some fast-acting carbs are:    1 tube of glucose gel.    3 sugar tablets (glucose pills).    6-8 pieces of hard candy.    4 oz (120 mL) of fruit juice.    4 oz (120 mL) of regular (not diet) soda.  · Check your blood sugar 15 minutes after you take the carb.  · If your blood sugar is still at or below 70 mg/dL (3.9 mmol/L), take 15 grams of a carb again.  · If your blood sugar does not go above 70 mg/dL (3.9 mmol/L) after 3 tries, get help right away.  · After your blood sugar goes back to normal, eat a meal or a snack within 1 hour.  Treating Very Low Blood Sugar  If your blood sugar is at or below 54 mg/dL (3 mmol/L), you have very low blood sugar (severe hypoglycemia). This is an emergency. Do not wait to see if the symptoms will go away. Get medical help right away. Call your local emergency services (911 in the [place] Do not drive yourself to the  hospital.  If you have very low blood sugar and you cannot eat or drink, you may need a glucagon shot (injection). A family member or friend should learn how to check your blood sugar and how to give you a glucagon shot. Ask your doctor if you need to have a glucagon shot kit at home.  HOME CARE  General Instructions  · Avoid any diets that cause you to not eat enough food. Talk with your doctor before you start any new diet.  · Take over-the-counter and prescription medicines only as told by your doctor.  · Limit alcohol to no more than 1 drink per day for nonpregnant women and 2 drinks per day for men. One drink equals 12 oz of beer, 5 oz of wine, or 1½ oz of hard liquor.  · Keep all follow-up visits as told by your doctor. This is important.  If You Have Diabetes:  · Make sure you know the symptoms of low blood sugar.  · Always keep a source of sugar with you, such as:    Sugar.    Sugar tablets.    Glucose gel.    Fruit juice.    Regular soda (not diet soda).    Milk.    Hard candy.    Honey.  · Take your medicines as told.  · Follow your exercise and meal plan.    Eat on time. Do not skip meals.    Follow your sick day plan when you cannot eat or drink normally. Make this plan ahead of time with your doctor.  · Check your blood sugar as often as told by your doctor. Always check before and after exercise.  · Share your diabetes care plan with:    Your work or school.    People you live with.  · Check your pee (urine) for ketones:    When you are sick.    As told by your doctor.  · Carry a card or wear jewelry that says you have diabetes.  If You Have Low Blood Sugar From Other Causes:  · Check your blood sugar as often as told by your doctor.  · Follow instructions from your doctor about what you cannot eat or drink.  GET HELP IF:  · You have trouble keeping your blood sugar in your target range.  · You have low blood sugar often.  GET HELP RIGHT AWAY IF:  · You still have symptoms after you eat or drink  something sugary.  · Your blood sugar is at or below 54 mg/dL (3 mmol/L).  · You have jerky movements that you cannot control.  · You pass out.  These symptoms may be an emergency. Do not wait to see if the symptoms will go away. Get medical help right away. Call your local emergency services (911 in the [place] Do not drive yourself to the hospital.     This information is not intended to replace advice given to you by your health care provider. Make sure you discuss any questions you have with your health care provider.     Document Released: 03/14/2011 Document Revised: 04/10/2017 Document Reviewed: 01/20/2017  Elsevier Interactive Patient Education ©2017 [employer]

## 2017-09-17 NOTE — PROGRESS NOTES
Subjective   Brenda Garzon is a 76 y.o. female. She presents today for follow up on an EMS visit to her home a few weeks ago for a hypoglycemic episode.  They gave her two packs of glucose gel and were able to get her blood sugar back up and she refused a trip to the ER.  I did send her in an RX for glucose gel.    Diabetes   She presents for her follow-up diabetic visit. She has type 2 diabetes mellitus. Her disease course has been stable. Hypoglycemia symptoms include confusion, dizziness, hunger, mood changes, nervousness/anxiousness, sleepiness, speech difficulty, sweats and tremors. Pertinent negatives for hypoglycemia include no headaches, pallor or seizures. There are no diabetic associated symptoms. Pertinent negatives for diabetes include no chest pain. Hypoglycemia complications include nocturnal hypoglycemia, required assistance and required glucagon injection. Pertinent negatives for hypoglycemia complications include no blackouts and no hospitalization. Symptoms are stable. There are no diabetic complications. Risk factors for coronary artery disease include diabetes mellitus, dyslipidemia, family history, hypertension, post-menopausal, sedentary lifestyle and stress. Current diabetic treatment includes insulin injections. She is compliant with treatment some of the time. Her weight is stable. She is following a generally healthy diet. When asked about meal planning, she reported none. She has not had a previous visit with a dietitian. She participates in exercise intermittently. An ACE inhibitor/angiotensin II receptor blocker is being taken.        The following portions of the patient's history were reviewed and updated as appropriate: allergies, current medications, past family history, past medical history, past social history, past surgical history and problem list.    Review of Systems   Constitutional: Negative.    Respiratory: Negative.  Negative for shortness of breath.    Cardiovascular:  Negative.  Negative for chest pain and palpitations.   Musculoskeletal: Negative for neck pain.   Skin: Negative.  Negative for pallor.   Neurological: Positive for dizziness, tremors and speech difficulty. Negative for seizures and headaches.   Psychiatric/Behavioral: Positive for confusion. The patient is nervous/anxious.        Objective   Physical Exam   Constitutional: She is oriented to person, place, and time. Vital signs are normal. She appears well-developed and well-nourished. No distress.   HENT:   Head: Normocephalic.   Right Ear: External ear normal.   Left Ear: External ear normal.   Nose: Nose normal.   Mouth/Throat: Oropharynx is clear and moist.   Eyes: EOM are normal. Pupils are equal, round, and reactive to light.   Neck: Normal range of motion. Neck supple. No JVD present. No thyromegaly present.   Cardiovascular: Normal rate and regular rhythm.  Exam reveals no gallop and no friction rub.    No murmur heard.  Pulmonary/Chest: Effort normal and breath sounds normal. No respiratory distress. She has no wheezes. She has no rales.   Musculoskeletal: Normal range of motion.   Neurological: She is alert and oriented to person, place, and time.   Skin: Skin is warm and dry. No rash noted. She is not diaphoretic. No erythema. No pallor.   Psychiatric: She has a normal mood and affect. Her behavior is normal. Judgment and thought content normal.   Nursing note and vitals reviewed.      Assessment/Plan   Brenda was seen today for follow-up and diabetes.    Diagnoses and all orders for this visit:    Essential hypertension  -     CBC (No Diff)  -     Comprehensive Metabolic Panel  -     Microalbumin / Creatinine Urine Ratio    Vitamin D deficiency  -     Vitamin D 25 Hydroxy    Uncontrolled type 2 diabetes mellitus with complication, with long-term current use of insulin  -     Hemoglobin A1c  -     Microalbumin / Creatinine Urine Ratio    Dyslipidemia  -     Lipid Panel    Thyroid nodule  -      TSH    Fasting labs.  Continue current medications.  Follow up as scheduled for routine follow up.  Follow up sooner for problems/concerns.  Discharge instructions given regarding hypoglycemia.  Patient verbalized understanding and agreement with plan of care.        This document has been electronically signed by CHEYENNE Lerma on September 17, 2017 1:41 PM

## 2017-09-18 LAB
25(OH)D3 SERPL-MCNC: 30.3 NG/ML (ref 30–100)
ALBUMIN SERPL-MCNC: 4.2 G/DL (ref 3.4–4.8)
ALBUMIN UR-MCNC: 1.1 MG/L
ALBUMIN/GLOB SERPL: 1.4 G/DL (ref 1.1–1.8)
ALP SERPL-CCNC: 103 U/L (ref 38–126)
ALT SERPL W P-5'-P-CCNC: 30 U/L (ref 9–52)
ANION GAP SERPL CALCULATED.3IONS-SCNC: 11 MMOL/L (ref 5–15)
ARTICHOKE IGE QN: 129 MG/DL (ref 1–129)
AST SERPL-CCNC: 25 U/L (ref 14–36)
BILIRUB SERPL-MCNC: 0.4 MG/DL (ref 0.2–1.3)
BUN BLD-MCNC: 37 MG/DL (ref 7–21)
BUN/CREAT SERPL: 26.1 (ref 7–25)
CALCIUM SPEC-SCNC: 9.4 MG/DL (ref 8.4–10.2)
CHLORIDE SERPL-SCNC: 104 MMOL/L (ref 95–110)
CHOLEST SERPL-MCNC: 186 MG/DL (ref 0–199)
CO2 SERPL-SCNC: 26 MMOL/L (ref 22–31)
CREAT BLD-MCNC: 1.42 MG/DL (ref 0.5–1)
CREAT UR-MCNC: 92.1 MG/DL
DEPRECATED RDW RBC AUTO: 43.6 FL (ref 36.4–46.3)
ERYTHROCYTE [DISTWIDTH] IN BLOOD BY AUTOMATED COUNT: 14.2 % (ref 11.5–14.5)
GFR SERPL CREATININE-BSD FRML MDRD: 36 ML/MIN/1.73 (ref 39–90)
GLOBULIN UR ELPH-MCNC: 3 GM/DL (ref 2.3–3.5)
GLUCOSE BLD-MCNC: 38 MG/DL (ref 60–100)
HBA1C MFR BLD: 7.1 % (ref 4–5.6)
HCT VFR BLD AUTO: 34.2 % (ref 35–45)
HDLC SERPL-MCNC: 41 MG/DL (ref 60–200)
HGB BLD-MCNC: 10.8 G/DL (ref 12–15.5)
LDLC/HDLC SERPL: 2.93 {RATIO} (ref 0–3.22)
MCH RBC QN AUTO: 26.6 PG (ref 26.5–34)
MCHC RBC AUTO-ENTMCNC: 31.6 G/DL (ref 31.4–36)
MCV RBC AUTO: 84.2 FL (ref 80–98)
MICROALBUMIN/CREAT UR: 11.9 MG/G (ref 0–30)
PLATELET # BLD AUTO: 200 10*3/MM3 (ref 150–450)
PMV BLD AUTO: 12.2 FL (ref 8–12)
POTASSIUM BLD-SCNC: 4.6 MMOL/L (ref 3.5–5.1)
PROT SERPL-MCNC: 7.2 G/DL (ref 6.3–8.6)
RBC # BLD AUTO: 4.06 10*6/MM3 (ref 3.77–5.16)
SODIUM BLD-SCNC: 141 MMOL/L (ref 137–145)
TRIGL SERPL-MCNC: 124 MG/DL (ref 20–199)
TSH SERPL DL<=0.05 MIU/L-ACNC: 2.06 MIU/ML (ref 0.46–4.68)
WBC NRBC COR # BLD: 9.42 10*3/MM3 (ref 3.2–9.8)

## 2017-09-18 PROCEDURE — 80061 LIPID PANEL: CPT | Performed by: NURSE PRACTITIONER

## 2017-09-18 PROCEDURE — 82043 UR ALBUMIN QUANTITATIVE: CPT | Performed by: NURSE PRACTITIONER

## 2017-09-18 PROCEDURE — 36415 COLL VENOUS BLD VENIPUNCTURE: CPT | Performed by: NURSE PRACTITIONER

## 2017-09-18 PROCEDURE — 85027 COMPLETE CBC AUTOMATED: CPT | Performed by: NURSE PRACTITIONER

## 2017-09-18 PROCEDURE — 80053 COMPREHEN METABOLIC PANEL: CPT | Performed by: NURSE PRACTITIONER

## 2017-09-18 PROCEDURE — 84443 ASSAY THYROID STIM HORMONE: CPT | Performed by: NURSE PRACTITIONER

## 2017-09-18 PROCEDURE — 82306 VITAMIN D 25 HYDROXY: CPT | Performed by: NURSE PRACTITIONER

## 2017-09-18 PROCEDURE — 83036 HEMOGLOBIN GLYCOSYLATED A1C: CPT | Performed by: NURSE PRACTITIONER

## 2017-09-18 PROCEDURE — 82570 ASSAY OF URINE CREATININE: CPT | Performed by: NURSE PRACTITIONER

## 2017-09-19 ENCOUNTER — TELEPHONE (OUTPATIENT)
Dept: FAMILY MEDICINE CLINIC | Facility: CLINIC | Age: 77
End: 2017-09-19

## 2017-09-19 DIAGNOSIS — N18.30 CKD (CHRONIC KIDNEY DISEASE) STAGE 3, GFR 30-59 ML/MIN (HCC): ICD-10-CM

## 2017-09-19 DIAGNOSIS — IMO0002 UNCONTROLLED TYPE 2 DIABETES MELLITUS WITH COMPLICATION, WITH LONG-TERM CURRENT USE OF INSULIN: Primary | Chronic | ICD-10-CM

## 2017-10-10 ENCOUNTER — TELEPHONE (OUTPATIENT)
Dept: ENDOCRINOLOGY | Facility: CLINIC | Age: 77
End: 2017-10-10

## 2017-10-10 ENCOUNTER — OFFICE VISIT (OUTPATIENT)
Dept: ENDOCRINOLOGY | Facility: CLINIC | Age: 77
End: 2017-10-10

## 2017-10-10 VITALS
WEIGHT: 129.4 LBS | BODY MASS INDEX: 26.08 KG/M2 | HEART RATE: 70 BPM | SYSTOLIC BLOOD PRESSURE: 132 MMHG | DIASTOLIC BLOOD PRESSURE: 82 MMHG | HEIGHT: 59 IN

## 2017-10-10 DIAGNOSIS — IMO0002 UNCONTROLLED TYPE 2 DIABETES MELLITUS WITH COMPLICATION, WITH LONG-TERM CURRENT USE OF INSULIN: Primary | ICD-10-CM

## 2017-10-10 DIAGNOSIS — E11.649 TYPE 2 DIABETES MELLITUS WITH HYPOGLYCEMIA WITHOUT COMA, WITH LONG-TERM CURRENT USE OF INSULIN (HCC): Primary | ICD-10-CM

## 2017-10-10 DIAGNOSIS — E04.2 NONTOXIC MULTINODULAR GOITER: ICD-10-CM

## 2017-10-10 DIAGNOSIS — E11.59 HYPERTENSION ASSOCIATED WITH DIABETES (HCC): ICD-10-CM

## 2017-10-10 DIAGNOSIS — E78.2 MIXED DIABETIC HYPERLIPIDEMIA ASSOCIATED WITH TYPE 2 DIABETES MELLITUS (HCC): ICD-10-CM

## 2017-10-10 DIAGNOSIS — E11.69 MIXED DIABETIC HYPERLIPIDEMIA ASSOCIATED WITH TYPE 2 DIABETES MELLITUS (HCC): ICD-10-CM

## 2017-10-10 DIAGNOSIS — Z79.4 TYPE 2 DIABETES MELLITUS WITH HYPOGLYCEMIA WITHOUT COMA, WITH LONG-TERM CURRENT USE OF INSULIN (HCC): Primary | ICD-10-CM

## 2017-10-10 DIAGNOSIS — I15.2 HYPERTENSION ASSOCIATED WITH DIABETES (HCC): ICD-10-CM

## 2017-10-10 PROCEDURE — 99214 OFFICE O/P EST MOD 30 MIN: CPT | Performed by: INTERNAL MEDICINE

## 2017-10-10 PROCEDURE — PTNOCHG PR CUSTOM PT NO CHARGE VISIT: Performed by: INTERNAL MEDICINE

## 2017-10-10 RX ORDER — METFORMIN HYDROCHLORIDE 500 MG/1
TABLET, EXTENDED RELEASE ORAL
Qty: 30 TABLET | Refills: 11 | Status: SHIPPED | OUTPATIENT
Start: 2017-10-10 | End: 2018-04-26 | Stop reason: SDUPTHER

## 2017-10-10 NOTE — PROGRESS NOTES
" Brenda Garzon is a 76 y.o. female who presents for  evaluation of   Chief Complaint   Patient presents with   • Diabetes       Referring provider    CHEYENNE Lerma  [address]  [place] [address]    Primary Care Provider    CHEYENNE Lerma    Duration since 1978    Timing - Diabetes is Constant    Quality -  needs improvement    Severity -  severe    Complications - hypoglycemia    Current symptoms/problems  hypoglycemia      Alleviating Factors: Compliance      Side Effects  none    Current diet  in general, a \"healthy\" diet      Current exercise walking    Current monitoring regimen: home blood tests - 3 times daily  Home blood sugar records: 50 to 200    Hypoglycemia nocturnal    Past Medical History:   Diagnosis Date   • Chest pain    • Essential hypertension    • Essential hypertension     unspecified   • GERD (gastroesophageal reflux disease)    • Hiatal hernia    • Hyperlipidemia    • Hypertensive disorder    • Irregular heartbeat    • Multinodular goiter    • Neck pain    • Neurologic disorder associated with diabetes mellitus    • Postmenopausal state    • Thyroid nodule    • Type 2 diabetes mellitus    • Type 2 diabetes mellitus, uncontrolled    • Urinary tract infectious disease    • Vitamin D deficiency      Family History   Problem Relation Age of Onset   • Hypertension Mother    • Stroke Mother    • Heart attack Father    • Heart disease Father    • Diabetes Sister    • Diabetes Brother      Social History   Substance Use Topics   • Smoking status: Never Smoker   • Smokeless tobacco: Never Used   • Alcohol use No         Current Outpatient Prescriptions:   •  ACCU-CHEK YUSRA PLUS test strip, Use as directed, Disp: 200 each, Rfl: 3  •  ACCU-CHEK FASTCLIX LANCETS misc, Use as directed, Disp: 200 each, Rfl: 3  •  aspirin 81 MG tablet, Take 81 mg by mouth daily., Disp: , Rfl:   •  atorvastatin (LIPITOR) 20 MG tablet, Take 1 tablet by mouth  every night, Disp: 90 tablet, Rfl: 1  •  " diclofenac (VOLTAREN) 75 MG EC tablet, Take 1 tablet by mouth two  times daily, Disp: 180 tablet, Rfl: 1  •  Ergocalciferol (VITAMIN D2 PO), Take 50,000 Units by mouth 1 (one) time per week., Disp: , Rfl:   •  gabapentin (NEURONTIN) 300 MG capsule, Take 1 capsule by mouth Every Night., Disp: 90 capsule, Rfl: 3  •  Glucose 15 GM/32ML gel, Take 32 mL by mouth 1 (One) Time As Needed (hypoglycemia)., Disp: 96 mL, Rfl: 11  •  losartan (COZAAR) 50 MG tablet, Take 1 tablet by mouth two  times daily, Disp: 180 tablet, Rfl: 1  •  meclizine (ANTIVERT) 25 MG tablet, Take 0.5-1 tablets by mouth 3 (Three) Times a Day As Needed for dizziness., Disp: 90 tablet, Rfl: 0  •  metaxalone (SKELAXIN) 800 MG tablet, Take 400-800 mg by mouth 3 (three) times a day as needed for muscle spasms., Disp: , Rfl:   •  omeprazole (priLOSEC) 40 MG capsule, Take 1 capsule by mouth  daily, Disp: 90 capsule, Rfl: 1  •  Dulaglutide 0.75 MG/0.5ML solution pen-injector, Inject 0.75 mg under the skin 1 (One) Time Per Week., Disp: 4 pen, Rfl: 11  •  Insulin Glargine (TOUJEO SOLOSTAR) 300 UNIT/ML solution pen-injector, Inject 20 Units under the skin every night at bedtime., Disp: 2 pen, Rfl: 11  •  metFORMIN ER (GLUCOPHAGE XR) 500 MG 24 hr tablet, 1 tablet with supper, Disp: 30 tablet, Rfl: 11    Review of Systems    Review of Systems   Constitutional: Negative for activity change, appetite change, chills, diaphoresis, fatigue, fever and unexpected weight change.   HENT: Negative for congestion, dental problem, drooling, ear discharge, ear pain, facial swelling, mouth sores, postnasal drip, rhinorrhea, sinus pressure, sore throat, tinnitus, trouble swallowing and voice change.    Eyes: Negative for photophobia, pain, discharge, redness, itching and visual disturbance.   Respiratory: Negative for apnea, cough, choking, chest tightness, shortness of breath, wheezing and stridor.    Cardiovascular: Negative for chest pain, palpitations and leg swelling.  "  Gastrointestinal: Negative for abdominal distention, abdominal pain, constipation, diarrhea, nausea and vomiting.   Endocrine: Negative for cold intolerance, heat intolerance, polydipsia, polyphagia and polyuria.   Genitourinary: Negative for decreased urine volume, difficulty urinating, dysuria, flank pain, frequency, hematuria and urgency.   Musculoskeletal: Negative for arthralgias, back pain, gait problem, joint swelling, myalgias, neck pain and neck stiffness.   Skin: Negative for color change, pallor, rash and wound.   Allergic/Immunologic: Negative for immunocompromised state.   Neurological: Negative for dizziness, tremors, seizures, syncope, facial asymmetry, speech difficulty, weakness, light-headedness, numbness and headaches.   Hematological: Negative for adenopathy.   Psychiatric/Behavioral: Negative for agitation, behavioral problems, confusion, decreased concentration, dysphoric mood, hallucinations, self-injury, sleep disturbance and suicidal ideas. The patient is not nervous/anxious and is not hyperactive.         Objective:   /82 (BP Location: Right arm, Patient Position: Sitting, Cuff Size: Adult)  Pulse 70  Ht 59\" (149.9 cm)  Wt 129 lb 6.4 oz (58.7 kg)  BMI 26.14 kg/m2    Physical Exam   Constitutional: She is oriented to person, place, and time. She appears well-developed.   HENT:   Head: Normocephalic.   Right Ear: External ear normal.   Left Ear: External ear normal.   Nose: Nose normal.   Eyes: Conjunctivae and EOM are normal. No scleral icterus.   Neck: Normal range of motion. Neck supple. No tracheal deviation present. No thyromegaly present.   Cardiovascular: Normal rate, regular rhythm, normal heart sounds and intact distal pulses.  Exam reveals no gallop and no friction rub.    No murmur heard.  Pulmonary/Chest: Effort normal and breath sounds normal. No stridor. No respiratory distress. She has no wheezes. She has no rales. She exhibits no tenderness.   Abdominal: Soft. " Bowel sounds are normal. She exhibits no distension and no mass. There is no tenderness. There is no rebound and no guarding.   Musculoskeletal: Normal range of motion. She exhibits no tenderness or deformity.   Lymphadenopathy:     She has no cervical adenopathy.   Neurological: She is alert and oriented to person, place, and time. She displays normal reflexes. She exhibits normal muscle tone. Coordination normal.   Skin: No rash noted. No erythema. No pallor.   Psychiatric: She has a normal mood and affect. Her behavior is normal. Judgment and thought content normal.       Lab Review    Results for orders placed or performed in visit on 09/12/17   CBC (No Diff)   Result Value Ref Range    WBC 9.42 3.20 - 9.80 10*3/mm3    RBC 4.06 3.77 - 5.16 10*6/mm3    Hemoglobin 10.8 (L) 12.0 - 15.5 g/dL    Hematocrit 34.2 (L) 35.0 - 45.0 %    MCV 84.2 80.0 - 98.0 fL    MCH 26.6 26.5 - 34.0 pg    MCHC 31.6 31.4 - 36.0 g/dL    RDW 14.2 11.5 - 14.5 %    RDW-SD 43.6 36.4 - 46.3 fl    MPV 12.2 (H) 8.0 - 12.0 fL    Platelets 200 150 - 450 10*3/mm3   Comprehensive Metabolic Panel   Result Value Ref Range    Glucose 38 (C) 60 - 100 mg/dL    BUN 37 (H) 7 - 21 mg/dL    Creatinine 1.42 (H) 0.50 - 1.00 mg/dL    Sodium 141 137 - 145 mmol/L    Potassium 4.6 3.5 - 5.1 mmol/L    Chloride 104 95 - 110 mmol/L    CO2 26.0 22.0 - 31.0 mmol/L    Calcium 9.4 8.4 - 10.2 mg/dL    Total Protein 7.2 6.3 - 8.6 g/dL    Albumin 4.20 3.40 - 4.80 g/dL    ALT (SGPT) 30 9 - 52 U/L    AST (SGOT) 25 14 - 36 U/L    Alkaline Phosphatase 103 38 - 126 U/L    Total Bilirubin 0.4 0.2 - 1.3 mg/dL    eGFR Non  Amer 36 (L) 39 - 90 mL/min/1.73    Globulin 3.0 2.3 - 3.5 gm/dL    A/G Ratio 1.4 1.1 - 1.8 g/dL    BUN/Creatinine Ratio 26.1 (H) 7.0 - 25.0    Anion Gap 11.0 5.0 - 15.0 mmol/L   Hemoglobin A1c   Result Value Ref Range    Hemoglobin A1C 7.1 (H) 4 - 5.6 %   Lipid Panel   Result Value Ref Range    Total Cholesterol 186 0 - 199 mg/dL    Triglycerides 124 20 -  199 mg/dL    HDL Cholesterol 41 (L) 60 - 200 mg/dL    LDL Cholesterol  129 1 - 129 mg/dL    LDL/HDL Ratio 2.93 0.00 - 3.22   Microalbumin / Creatinine Urine Ratio   Result Value Ref Range    Microalbumin/Creatinine Ratio 11.9 0.0 - 30.0 mg/g    Creatinine, Urine 92.1 mg/dL    Microalbumin, Urine 1.1 mg/L   TSH   Result Value Ref Range    TSH 2.060 0.460 - 4.680 mIU/mL   Vitamin D 25 Hydroxy   Result Value Ref Range    25 Hydroxy, Vitamin D 30.3 30.0 - 100.0 ng/ml           Assessment/Plan       ICD-10-CM ICD-9-CM   1. Type 2 diabetes mellitus with hypoglycemia without coma, with long-term current use of insulin E11.649 250.80    Z79.4 251.2     V58.67   2. Hypertension associated with diabetes E11.59 250.80    I10 401.9   3. Mixed diabetic hyperlipidemia associated with type 2 diabetes mellitus E11.69 250.80    E78.2 272.2       Glycemic Management:   Lab Results   Component Value Date    HGBA1C 7.1 (H) 09/18/2017    HGBA1C 9.41 (H) 02/15/2017    HGBA1C 7.5 (H) 09/12/2016     Lab Results   Component Value Date    GLUCOSE 38 (C) 09/18/2017    BUN 37 (H) 09/18/2017    CREATININE 1.42 (H) 09/18/2017    EGFRIFNONA 36 (L) 09/18/2017    BCR 26.1 (H) 09/18/2017    K 4.6 09/18/2017    CO2 26.0 09/18/2017    CALCIUM 9.4 09/18/2017    ALBUMIN 4.20 09/18/2017    LABIL2 1.4 09/18/2017    AST 25 09/18/2017    ALT 30 09/18/2017    ANIONGAP 11.0 09/18/2017     Lab Results   Component Value Date    WBC 9.42 09/18/2017    HGB 10.8 (L) 09/18/2017    HCT 34.2 (L) 09/18/2017    MCV 84.2 09/18/2017     09/18/2017     On lantus 40 and janumet    ckd stage III    Change to toujeo 20 , trulicity 0.75 mg weekly and metformin 500 mg w supper         Lipid Management  Lab Results   Component Value Date    CHOL 186 09/18/2017     Lab Results   Component Value Date    TRIG 124 09/18/2017    TRIG 161 09/12/2016    TRIG 141 09/08/2014     Lab Results   Component Value Date    HDL 41 (L) 09/18/2017    HDL 36 (L) 09/12/2016    HDL 45 (L)  09/08/2014     Lab Results   Component Value Date    LDLCALC 126 09/12/2016    LDLCALC 104 09/08/2014     No results found for: LDL  Lab Results   Component Value Date    LDLDIRECT 129 09/18/2017     On lipitor 20 mg qhs     Blood Pressure Management:    Vitals:    10/10/17 1404   BP: 132/82   Pulse: 70     Lab Results   Component Value Date    GLUCOSE 38 (C) 09/18/2017    CALCIUM 9.4 09/18/2017     09/18/2017    K 4.6 09/18/2017    CO2 26.0 09/18/2017     09/18/2017    BUN 37 (H) 09/18/2017    CREATININE 1.42 (H) 09/18/2017    EGFRIFNONA 36 (L) 09/18/2017    BCR 26.1 (H) 09/18/2017    ANIONGAP 11.0 09/18/2017       Controlled on losartan         Microvascular Complication Monitoring:      Eye Exam Evaluation, within 1 year     -----------    Last Microalbumin-Proteinuria Assessment    Lab Results   Component Value Date    MALBCRERATIO 11.9 09/18/2017    MALBCRERATIO 8 09/12/2016       No results found for: UTPCR    -----------      Neuropathy        Weight Related:   Wt Readings from Last 3 Encounters:   10/10/17 129 lb 6.4 oz (58.7 kg)   09/12/17 133 lb 4.8 oz (60.5 kg)   08/03/17 128 lb 12.8 oz (58.4 kg)     Body mass index is 26.14 kg/(m^2).        Diet interventions: moderate (500 kCal/d) deficit diet.      Bone Health    Lab Results   Component Value Date    CALCIUM 9.4 09/18/2017    [id number] 30.3 09/18/2017       Thyroid Health    Lab Results   Component Value Date    TSH 2.060 09/18/2017    TSH 1.86 09/12/2016    TSH 0.96 05/06/2016     h o MNG  Obtain US        Other Diabetes Related Aspects       No results found for: [id number]    Suggest otc b12    Last celiac panel     No results found for: GDPABIGA, TTRANSGLIGA, IGA, [id number]      I reviewed and summarized records from CHEYENNE Lerma from 2017 and I reviewed / ordered labs.     No orders of the defined types were placed in this encounter.        A copy of my note was sent to CHEYENNE Lerma    Please see my above opinion and  suggestions.

## 2017-10-11 NOTE — PROGRESS NOTES
Brenda Garzon is a 76 y.o. female seen by diabetes educator 10/10/2017 for Trulicity injection training.     1. STOP Janumet  2. Toujeo--stop Lantus. Take 20 units qhs.   3. Trulicity Injection Training   I. Reviewed injection sites, skin prep, and site rotation   ii. Discussed storage and disposal of Trulicity pens   iii. Explained to patient this is a once-weekly injection with a single-dose pen. Instructed her to take on the same day of each week. Reviewed missed dosing window.    iv. Patient practiced with demo pen in office.    v. Reviewed side effects--instructed patient to stop Trulicity and call office if vomiting occurs.    vi. Provided written instructions.     Debbie Braxton MS, RD, LD, CDE

## 2017-12-06 ENCOUNTER — OFFICE VISIT (OUTPATIENT)
Dept: FAMILY MEDICINE CLINIC | Facility: CLINIC | Age: 77
End: 2017-12-06

## 2017-12-06 VITALS
RESPIRATION RATE: 20 BRPM | TEMPERATURE: 97.8 F | OXYGEN SATURATION: 96 % | HEIGHT: 59 IN | SYSTOLIC BLOOD PRESSURE: 130 MMHG | DIASTOLIC BLOOD PRESSURE: 80 MMHG | WEIGHT: 124.8 LBS | BODY MASS INDEX: 25.16 KG/M2 | HEART RATE: 78 BPM

## 2017-12-06 DIAGNOSIS — E11.649 TYPE 2 DIABETES MELLITUS WITH HYPOGLYCEMIA WITHOUT COMA, WITH LONG-TERM CURRENT USE OF INSULIN (HCC): Primary | ICD-10-CM

## 2017-12-06 DIAGNOSIS — E78.2 MIXED DIABETIC HYPERLIPIDEMIA ASSOCIATED WITH TYPE 2 DIABETES MELLITUS (HCC): ICD-10-CM

## 2017-12-06 DIAGNOSIS — Z23 NEEDS FLU SHOT: ICD-10-CM

## 2017-12-06 DIAGNOSIS — E11.69 MIXED DIABETIC HYPERLIPIDEMIA ASSOCIATED WITH TYPE 2 DIABETES MELLITUS (HCC): ICD-10-CM

## 2017-12-06 DIAGNOSIS — Z79.4 TYPE 2 DIABETES MELLITUS WITH HYPOGLYCEMIA WITHOUT COMA, WITH LONG-TERM CURRENT USE OF INSULIN (HCC): Primary | ICD-10-CM

## 2017-12-06 PROCEDURE — G0008 ADMIN INFLUENZA VIRUS VAC: HCPCS | Performed by: NURSE PRACTITIONER

## 2017-12-06 PROCEDURE — 90686 IIV4 VACC NO PRSV 0.5 ML IM: CPT | Performed by: NURSE PRACTITIONER

## 2017-12-06 PROCEDURE — 99214 OFFICE O/P EST MOD 30 MIN: CPT | Performed by: NURSE PRACTITIONER

## 2017-12-06 NOTE — PATIENT INSTRUCTIONS
Preventive Care 65 Years and Older, Female  Preventive care refers to lifestyle choices and visits with your health care provider that can promote health and wellness.  WHAT DOES PREVENTIVE CARE INCLUDE?  · A yearly physical exam. This is also called an annual well check.  · Dental exams once or twice a year.  · Routine eye exams. Ask your health care provider how often you should have your eyes checked.  · Personal lifestyle choices, including:    Daily care of your teeth and gums.    Regular physical activity.    Eating a healthy diet.    Avoiding tobacco and drug use.    Limiting alcohol use.    Practicing safe sex.    Taking low-dose aspirin every day.    Taking vitamin and mineral supplements as recommended by your health care provider.  WHAT HAPPENS DURING AN ANNUAL WELL CHECK?  The services and screenings done by your health care provider during your annual well check will depend on your age, overall health, lifestyle risk factors, and family history of disease.  Counseling  Your health care provider may ask you questions about your:  · Alcohol use.  · Tobacco use.  · Drug use.  · Emotional well-being.  · Home and relationship well-being.  · Sexual activity.  · Eating habits.  · History of falls.  · Memory and ability to understand (cognition).  · Work and work environment.  · Reproductive health.  Screening  You may have the following tests or measurements:  · Height, weight, and BMI.  · Blood pressure.  · Lipid and cholesterol levels. These may be checked every 5 years, or more frequently if you are over 50 years old.  · Skin check.  · Lung cancer screening. You may have this screening every year starting at age 55 if you have a 30-pack-year history of smoking and currently smoke or have quit within the past 15 years.  · Fecal occult blood test (FOBT) of the stool. You may have this test every year starting at age 50.  · Flexible sigmoidoscopy or colonoscopy. You may have a sigmoidoscopy every 5 years or a  colonoscopy every 10 years starting at age 50.  · Hepatitis C blood test.  · Hepatitis B blood test.  · Sexually transmitted disease (STD) testing.  · Diabetes screening. This is done by checking your blood sugar (glucose) after you have not eaten for a while (fasting). You may have this done every 1-3 years.  · Bone density scan. This is done to screen for osteoporosis. You may have this done starting at age 65.  · Mammogram. This may be done every 1-2 years. Talk to your health care provider about how often you should have regular mammograms.  Talk with your health care provider about your test results, treatment options, and if necessary, the need for more tests.  Vaccines  Your health care provider may recommend certain vaccines, such as:  · Influenza vaccine. This is recommended every year.  · Tetanus, diphtheria, and acellular pertussis (Tdap, Td) vaccine. You may need a Td booster every 10 years.  · Varicella vaccine. You may need this if you have not been vaccinated.  · Zoster vaccine. You may need this after age 60.  · Measles, mumps, and rubella (MMR) vaccine. You may need at least one dose of MMR if you were born in 1957 or later. You may also need a second dose.    · Pneumococcal 13-valent conjugate (PCV13) vaccine. One dose is recommended after age 65.  · Pneumococcal polysaccharide (PPSV23) vaccine. One dose is recommended after age 65.  · Meningococcal vaccine. You may need this if you have certain conditions.  · Hepatitis A vaccine. You may need this if you have certain conditions or if you travel or work in places where you may be exposed to hepatitis A.  · Hepatitis B vaccine. You may need this if you have certain conditions or if you travel or work in places where you may be exposed to hepatitis B.  · Haemophilus influenzae type b (Hib) vaccine. You may need this if you have certain conditions.  Talk to your health care provider about which screenings and vaccines you need and how often you need  them.     This information is not intended to replace advice given to you by your health care provider. Make sure you discuss any questions you have with your health care provider.     Document Released: 01/13/2017 Document Reviewed: 01/13/2017  [employer] Interactive Patient Education ©2017 [employer]  Diabetes Mellitus and Food  It is important for you to manage your blood sugar (glucose) level. Your blood glucose level can be greatly affected by what you eat. Eating healthier foods in the appropriate amounts throughout the day at about the same time each day will help you control your blood glucose level. It can also help slow or prevent worsening of your diabetes mellitus. Healthy eating may even help you improve the level of your blood pressure and reach or maintain a healthy weight.   General recommendations for healthful eating and cooking habits include:  · Eating meals and snacks regularly. Avoid going long periods of time without eating to lose weight.  · Eating a diet that consists mainly of plant-based foods, such as fruits, vegetables, nuts, legumes, and whole grains.  · Using low-heat cooking methods, such as baking, instead of high-heat cooking methods, such as deep frying.  Work with your dietitian to make sure you understand how to use the Nutrition Facts information on food labels.  HOW CAN FOOD AFFECT ME?  Carbohydrates  Carbohydrates affect your blood glucose level more than any other type of food. Your dietitian will help you determine how many carbohydrates to eat at each meal and teach you how to count carbohydrates. Counting carbohydrates is important to keep your blood glucose at a healthy level, especially if you are using insulin or taking certain medicines for diabetes mellitus.  Alcohol  Alcohol can cause sudden decreases in blood glucose (hypoglycemia), especially if you use insulin or take certain medicines for diabetes mellitus. Hypoglycemia can be a life-threatening condition.  Symptoms of hypoglycemia (sleepiness, dizziness, and disorientation) are similar to symptoms of having too much alcohol.   If your health care provider has given you approval to drink alcohol, do so in moderation and use the following guidelines:  · Women should not have more than one drink per day, and men should not have more than two drinks per day. One drink is equal to:    12 oz of beer.    5 oz of wine.    1½ oz of hard liquor.  · Do not drink on an empty stomach.  · Keep yourself hydrated. Have water, diet soda, or unsweetened iced tea.  · Regular soda, juice, and other mixers might contain a lot of carbohydrates and should be counted.  WHAT FOODS ARE NOT RECOMMENDED?  As you make food choices, it is important to remember that all foods are not the same. Some foods have fewer nutrients per serving than other foods, even though they might have the same number of calories or carbohydrates. It is difficult to get your body what it needs when you eat foods with fewer nutrients. Examples of foods that you should avoid that are high in calories and carbohydrates but low in nutrients include:  · Trans fats (most processed foods list trans fats on the Nutrition Facts label).  · Regular soda.  · Juice.  · Candy.  · Sweets, such as cake, pie, doughnuts, and cookies.  · Fried foods.  WHAT FOODS CAN I EAT?  Eat nutrient-rich foods, which will nourish your body and keep you healthy. The food you should eat also will depend on several factors, including:  · The calories you need.  · The medicines you take.  · Your weight.  · Your blood glucose level.  · Your blood pressure level.  · Your cholesterol level.  You should eat a variety of foods, including:  · Protein.    Lean cuts of meat.    Proteins low in saturated fats, such as fish, egg whites, and beans. Avoid processed meats.  · Fruits and vegetables.    Fruits and vegetables that may help control blood glucose levels, such as apples, mangoes, and yams.  · Dairy  products.    Choose fat-free or low-fat dairy products, such as milk, yogurt, and cheese.  · Grains, bread, pasta, and rice.    Choose whole grain products, such as multigrain bread, whole oats, and brown rice. These foods may help control blood pressure.  · Fats.    Foods containing healthful fats, such as nuts, avocado, olive oil, canola oil, and fish.  DOES EVERYONE WITH DIABETES MELLITUS HAVE THE SAME MEAL PLAN?  Because every person with diabetes mellitus is different, there is not one meal plan that works for everyone. It is very important that you meet with a dietitian who will help you create a meal plan that is just right for you.     This information is not intended to replace advice given to you by your health care provider. Make sure you discuss any questions you have with your health care provider.     Document Released: 09/14/2006 Document Revised: 01/08/2016 Document Reviewed: 11/14/2014  [employer] Interactive Patient Education ©2017 [employer]    Tips for Eating Away From Home If You Have Diabetes  Controlling your level of blood glucose, also known as blood sugar, can be challenging. It can be even more difficult when you do not prepare your own meals. The following tips can help you manage your diabetes when you eat away from home.  PLANNING AHEAD  Plan ahead if you know you will be eating away from home:  · Ask your health care provider how to time meals and medicine if you are taking insulin.  · Make a list of restaurants near you that offer healthy choices. If they have a carry-out menu, take it home and plan what you will order ahead of time.  · Look up the restaurant you want to eat at online. Many chain and fast-food restaurants list nutritional information online. Use this information to choose the healthiest options and to calculate how many carbohydrates will be in your meal.  · Use a carbohydrate-counting book or mobile raphael to look up the carbohydrate content and serving size of the  "foods you want to eat.  · Become familiar with serving sizes and learn to recognize how many servings are in a portion. This will allow you to estimate how many carbohydrates you can eat.  FREE FOODS  A \"free food\" is any food or drink that has less than 5 g of carbohydrates per serving. Free foods include:  · Many vegetables.  · Hard boiled eggs.  · Nuts or seeds.  · Olives.  · Cheeses.  · Meats.  These types of foods make good appetizer choices and are often available at salad bars. Lemon juice, vinegar, or a low-calorie salad dressing of fewer than 20 calories per serving can be used as a \"free\" salad dressing.   CHOICES TO REDUCE CARBOHYDRATES  · Substitute nonfat sweetened yogurt with a sugar-free yogurt. Yogurt made from soy milk may also be used, but you will still want a sugar-free or plain option to choose a lower carbohydrate amount.  · Ask your  to take away the bread basket or chips from your table.  · Order fresh fruit. A salad bar often offers fresh fruit choices. Avoid canned fruit because it is usually packed in sugar or syrup.  · Order a salad, and eat it without dressing. Or, create a \"free\" salad dressing.  · Ask for substitutions. For example, instead of French fries, request an order of a vegetable such as salad, green beans, or broccoli.  OTHER TIPS   · If you take insulin, take the insulin once your food arrives to your table. This will ensure your insulin and food are timed correctly.  · Ask your  about the portion size before your order, and ask for a take-out box if the portion has more servings than you should have. When your food comes, leave the amount you should have on the plate, and put the rest in the take-out box.  · Consider splitting an entree with someone and ordering a side salad.     This information is not intended to replace advice given to you by your health care provider. Make sure you discuss any questions you have with your health care provider.     Document " Released: 12/18/2006 Document Revised: 04/10/2017 Document Reviewed: 03/17/2015  [employer] Interactive Patient Education ©2017 [employer]

## 2017-12-08 ENCOUNTER — OFFICE VISIT (OUTPATIENT)
Dept: ENDOCRINOLOGY | Facility: CLINIC | Age: 77
End: 2017-12-08

## 2017-12-08 VITALS
WEIGHT: 123.25 LBS | BODY MASS INDEX: 24.85 KG/M2 | SYSTOLIC BLOOD PRESSURE: 126 MMHG | DIASTOLIC BLOOD PRESSURE: 80 MMHG | HEART RATE: 70 BPM | HEIGHT: 59 IN

## 2017-12-08 DIAGNOSIS — I10 ESSENTIAL HYPERTENSION: Chronic | ICD-10-CM

## 2017-12-08 DIAGNOSIS — E78.2 MIXED DIABETIC HYPERLIPIDEMIA ASSOCIATED WITH TYPE 2 DIABETES MELLITUS (HCC): ICD-10-CM

## 2017-12-08 DIAGNOSIS — E11.69 MIXED DIABETIC HYPERLIPIDEMIA ASSOCIATED WITH TYPE 2 DIABETES MELLITUS (HCC): ICD-10-CM

## 2017-12-08 DIAGNOSIS — Z79.4 TYPE 2 DIABETES MELLITUS WITH HYPOGLYCEMIA WITHOUT COMA, WITH LONG-TERM CURRENT USE OF INSULIN (HCC): Primary | ICD-10-CM

## 2017-12-08 DIAGNOSIS — E11.59 HYPERTENSION ASSOCIATED WITH DIABETES (HCC): ICD-10-CM

## 2017-12-08 DIAGNOSIS — E11.649 TYPE 2 DIABETES MELLITUS WITH HYPOGLYCEMIA WITHOUT COMA, WITH LONG-TERM CURRENT USE OF INSULIN (HCC): Primary | ICD-10-CM

## 2017-12-08 DIAGNOSIS — I15.2 HYPERTENSION ASSOCIATED WITH DIABETES (HCC): ICD-10-CM

## 2017-12-08 PROCEDURE — 99214 OFFICE O/P EST MOD 30 MIN: CPT | Performed by: NURSE PRACTITIONER

## 2017-12-08 NOTE — PROGRESS NOTES
"  Subjective    Brenda Garzon is a 77 y.o. female. she is here today for follow-up.    History of Present Illness       Primary Care Provider     CHEYENNE Lerma     Duration since 1978     Timing - Diabetes is Constant     Quality -  needs improvement     Severity -  severe     Complications - hypoglycemia     Current symptoms/problems  hypoglycemia       Alleviating Factors: Compliance       Side Effects  none     Current diet  in general, a \"healthy\" diet       Current exercise walking     Current monitoring regimen: home blood tests - 3 times daily  Home blood sugar records:     No longer having lows    Waking up at goal -- 129          Hypoglycemia none since last visit                    The following portions of the patient's history were reviewed and updated as appropriate:   Past Medical History:   Diagnosis Date   • Chest pain    • Essential hypertension    • Essential hypertension     unspecified   • GERD (gastroesophageal reflux disease)    • Hiatal hernia    • Hyperlipidemia    • Hypertensive disorder    • Irregular heartbeat    • Multinodular goiter    • Neck pain    • Neurologic disorder associated with diabetes mellitus    • Postmenopausal state    • Thyroid nodule    • Type 2 diabetes mellitus    • Type 2 diabetes mellitus, uncontrolled    • Urinary tract infectious disease    • Vitamin D deficiency      Past Surgical History:   Procedure Laterality Date   • COLONOSCOPY  11/08/2013    1 polyp in ascending colon; removed by snare cautery polypectomy. Internal & external hemorrhoids found.   • LEG SURGERY Right    • PARTIAL HYSTERECTOMY     • UPPER GASTROINTESTINAL ENDOSCOPY  11/08/2013    Esophageal stricture present. Dilatation performed. Hiatus hernia in esophaugs. Gastritis in stomach. Biopsy taken. Normal duodenum. Biopsy taken.     Family History   Problem Relation Age of Onset   • Hypertension Mother    • Stroke Mother    • Heart attack Father    • Heart disease Father    • Diabetes Sister  "   • Diabetes Brother      OB History     No data available        Current Outpatient Prescriptions   Medication Sig Dispense Refill   • ACCU-CHEK YUSRA PLUS test strip Use as directed 200 each 3   • ACCU-CHEK FASTCLIX LANCETS misc Use as directed 200 each 3   • aspirin 81 MG tablet Take 81 mg by mouth daily.     • atorvastatin (LIPITOR) 20 MG tablet Take 1 tablet by mouth  every night 90 tablet 1   • diclofenac (VOLTAREN) 75 MG EC tablet Take 1 tablet by mouth two  times daily 180 tablet 1   • Dulaglutide 0.75 MG/0.5ML solution pen-injector Inject 0.75 mg under the skin 1 (One) Time Per Week. 4 pen 11   • Ergocalciferol (VITAMIN D2 PO) Take 50,000 Units by mouth 1 (one) time per week.     • gabapentin (NEURONTIN) 300 MG capsule Take 1 capsule by mouth Every Night. 90 capsule 3   • Glucose 15 GM/32ML gel Take 32 mL by mouth 1 (One) Time As Needed (hypoglycemia). 96 mL 11   • Insulin Glargine (TOUJEO SOLOSTAR) 300 UNIT/ML solution pen-injector Inject 20 Units under the skin every night at bedtime. 2 pen 11   • losartan (COZAAR) 50 MG tablet Take 1 tablet by mouth two  times daily 180 tablet 1   • meclizine (ANTIVERT) 25 MG tablet Take 0.5-1 tablets by mouth 3 (Three) Times a Day As Needed for dizziness. 90 tablet 0   • metaxalone (SKELAXIN) 800 MG tablet Take 400-800 mg by mouth 3 (three) times a day as needed for muscle spasms.     • metFORMIN ER (GLUCOPHAGE XR) 500 MG 24 hr tablet 1 tablet with supper 30 tablet 11   • omeprazole (priLOSEC) 40 MG capsule Take 1 capsule by mouth  daily 90 capsule 1     No current facility-administered medications for this visit.      Allergies   Allergen Reactions   • Codeine      Social History     Social History   • Marital status:      Spouse name: N/A   • Number of children: N/A   • Years of education: N/A     Social History Main Topics   • Smoking status: Never Smoker   • Smokeless tobacco: Never Used   • Alcohol use No   • Drug use: No   • Sexual activity: Defer     Other  "Topics Concern   • None     Social History Narrative       Review of Systems  Review of Systems   Constitutional: Negative for activity change, appetite change, diaphoresis and fatigue.   HENT: Negative for congestion, dental problem, facial swelling, sneezing, sore throat, tinnitus, trouble swallowing and voice change.    Eyes: Negative for photophobia, pain, discharge, redness, itching and visual disturbance.   Respiratory: Negative for apnea, cough, choking, chest tightness and shortness of breath.    Cardiovascular: Negative for chest pain, palpitations and leg swelling.   Gastrointestinal: Negative for abdominal distention, abdominal pain, constipation, diarrhea, nausea and vomiting.   Endocrine: Negative for cold intolerance, heat intolerance, polydipsia, polyphagia and polyuria.   Genitourinary: Negative for difficulty urinating, dysuria, frequency, hematuria and urgency.   Musculoskeletal: Negative for arthralgias, back pain, gait problem, joint swelling, myalgias, neck pain and neck stiffness.   Skin: Negative for color change, pallor, rash and wound.   Allergic/Immunologic: Negative for environmental allergies.   Neurological: Negative for dizziness, tremors, weakness, light-headedness, numbness and headaches.   Hematological: Negative for adenopathy. Does not bruise/bleed easily.   Psychiatric/Behavioral: Negative for agitation, behavioral problems, confusion and sleep disturbance.        Objective    /80 (BP Location: Right arm, Patient Position: Sitting, Cuff Size: Adult)  Pulse 70  Ht 149.9 cm (59\")  Wt 55.9 kg (123 lb 4 oz)  LMP  (LMP Unknown)  Breastfeeding? No  BMI 24.89 kg/m2  Physical Exam   Constitutional: She is oriented to person, place, and time. She appears well-developed and well-nourished. No distress.   HENT:   Head: Normocephalic and atraumatic.   Right Ear: External ear normal.   Left Ear: External ear normal.   Nose: Nose normal.   Eyes: Conjunctivae and EOM are normal. " Pupils are equal, round, and reactive to light.   Neck: Normal range of motion. Neck supple. No tracheal deviation present. No thyromegaly present.   Cardiovascular: Normal rate, regular rhythm and normal heart sounds.    No murmur heard.  Pulmonary/Chest: Effort normal and breath sounds normal. No respiratory distress. She has no wheezes.   Abdominal: Soft. Bowel sounds are normal. There is no tenderness. There is no rebound and no guarding.   Musculoskeletal: Normal range of motion. She exhibits no edema, tenderness or deformity.   Neurological: She is alert and oriented to person, place, and time. No cranial nerve deficit.   Skin: Skin is warm and dry. No rash noted.   Psychiatric: She has a normal mood and affect. Her behavior is normal. Judgment and thought content normal.       Lab Review  Glucose   Date Value   09/18/2017 38 mg/dL (C)   02/15/2017 60 mg/dL   09/12/2016 57 mg/dl (L)   05/06/2016 85 mg/dl   02/26/2016 186 mg/dl (H)     Sodium (mmol/L)   Date Value   09/18/2017 141   02/15/2017 139   09/12/2016 145   05/06/2016 141   02/26/2016 138     Potassium (mmol/L)   Date Value   09/18/2017 4.6   02/15/2017 4.8   09/12/2016 5.9 (H)   05/06/2016 5.5 (H)   02/26/2016 4.8     Chloride (mmol/L)   Date Value   09/18/2017 104   02/15/2017 104   09/12/2016 107   05/06/2016 101   02/26/2016 98     CO2 (mmol/L)   Date Value   09/18/2017 26.0   02/15/2017 27.0   09/12/2016 25   05/06/2016 27   02/26/2016 27     BUN   Date Value   09/18/2017 37 mg/dL (H)   02/15/2017 32 mg/dL (H)   09/12/2016 35 mg/dl (H)   05/06/2016 19 mg/dl   02/26/2016 27 mg/dl (H)     Creatinine   Date Value   09/18/2017 1.42 mg/dL (H)   02/15/2017 1.27 mg/dL (H)   09/12/2016 1.3 mg/dl (H)   05/06/2016 1.4 mg/dl (H)   02/26/2016 1.0 mg/dl     Hemoglobin A1C   Date Value   09/18/2017 7.1 % (H)   02/15/2017 9.41 % (H)   09/12/2016 7.5 %TotHgb (H)   05/06/2016 6.8 %TotHgb (H)   02/26/2016 9.1 %TotHgb (H)     Triglycerides   Date Value   09/18/2017  124 mg/dL   09/12/2016 161 mg/dl   09/08/2014 141 mg/dl       Assessment/Plan      1. Type 2 diabetes mellitus with hypoglycemia without coma, with long-term current use of insulin    2. Hypertension associated with diabetes    3. Mixed diabetic hyperlipidemia associated with type 2 diabetes mellitus    4. Essential hypertension    .    Medications prescribed:  Outpatient Encounter Prescriptions as of 12/8/2017   Medication Sig Dispense Refill   • ACCU-CHEK YUSRA PLUS test strip Use as directed 200 each 3   • ACCU-CHEK FASTCLIX LANCETS misc Use as directed 200 each 3   • aspirin 81 MG tablet Take 81 mg by mouth daily.     • atorvastatin (LIPITOR) 20 MG tablet Take 1 tablet by mouth  every night 90 tablet 1   • diclofenac (VOLTAREN) 75 MG EC tablet Take 1 tablet by mouth two  times daily 180 tablet 1   • Dulaglutide 0.75 MG/0.5ML solution pen-injector Inject 0.75 mg under the skin 1 (One) Time Per Week. 4 pen 11   • Ergocalciferol (VITAMIN D2 PO) Take 50,000 Units by mouth 1 (one) time per week.     • gabapentin (NEURONTIN) 300 MG capsule Take 1 capsule by mouth Every Night. 90 capsule 3   • Glucose 15 GM/32ML gel Take 32 mL by mouth 1 (One) Time As Needed (hypoglycemia). 96 mL 11   • Insulin Glargine (TOUJEO SOLOSTAR) 300 UNIT/ML solution pen-injector Inject 20 Units under the skin every night at bedtime. 2 pen 11   • losartan (COZAAR) 50 MG tablet Take 1 tablet by mouth two  times daily 180 tablet 1   • meclizine (ANTIVERT) 25 MG tablet Take 0.5-1 tablets by mouth 3 (Three) Times a Day As Needed for dizziness. 90 tablet 0   • metaxalone (SKELAXIN) 800 MG tablet Take 400-800 mg by mouth 3 (three) times a day as needed for muscle spasms.     • metFORMIN ER (GLUCOPHAGE XR) 500 MG 24 hr tablet 1 tablet with supper 30 tablet 11   • omeprazole (priLOSEC) 40 MG capsule Take 1 capsule by mouth  daily 90 capsule 1     No facility-administered encounter medications on file as of 12/8/2017.        Orders placed during this  encounter include:  No orders of the defined types were placed in this encounter.    Glycemic Management:       Lab Results   Component Value Date    HGBA1C 7.1 (H) 09/18/2017       stopped  lantus 40 -- changed to Toujeo taking 20 units       Stopped janumet         ckd stage III      toujeo 20 ,       trulicity 0.75 mg weekly        metformin 500 mg w supper            Lipid Management     On lipitor 20 mg qhs      Blood Pressure Management:         Controlled on losartan            Microvascular Complication Monitoring:       Eye Exam Evaluation, within 1 year      -----------     Last Microalbumin-Proteinuria Assessment           Lab Results   Component Value Date     MALBCRERATIO 11.9 09/18/2017     MALBCRERATIO 8 09/12/2016            -----------        Neuropathy           Weight Related:              Diet interventions: moderate (500 kCal/d) deficit diet.        Bone Health           Lab Results   Component Value Date     CALCIUM 9.4 09/18/2017     [id number] 30.3 09/18/2017         Thyroid Health     Lab Results   Component Value Date     TSH 2.060 09/18/2017     TSH 1.86 09/12/2016     TSH 0.96 05/06/2016      h o MNG  Obtain US           Other Diabetes Related Aspects         No results found for: [id number]     Suggest otc b12           4. Follow-up: Return in about 3 months (around 3/8/2018) for Recheck.

## 2017-12-13 NOTE — PROGRESS NOTES
Subjective   Brenda Garzon is a 77 y.o. female. She presents today for her routine follow up on chronic medical problems.  Has been doing well since her last visit with me.  Reports her blood sugars have been better controlled since seeing Dr. Romero.  She would like to get her flu shot today in the office.  Also due for diabetic eye and foot exams.    Diabetes   She presents for her follow-up diabetic visit. She has type 2 diabetes mellitus. Her disease course has been stable. There are no hypoglycemic associated symptoms. Pertinent negatives for hypoglycemia include no headaches. There are no diabetic associated symptoms. Pertinent negatives for diabetes include no chest pain. Symptoms are stable. There are no diabetic complications. Risk factors for coronary artery disease include diabetes mellitus, dyslipidemia, family history, hypertension, post-menopausal, sedentary lifestyle and stress. Current diabetic treatment includes insulin injections. She is compliant with treatment most of the time. Her weight is stable. She is following a generally healthy diet. When asked about meal planning, she reported none. She has had a previous visit with a dietitian. She never participates in exercise. Her home blood glucose trend is decreasing steadily. An ACE inhibitor/angiotensin II receptor blocker is being taken. She does not see a podiatrist.Eye exam is not current.        The following portions of the patient's history were reviewed and updated as appropriate: allergies, current medications, past family history, past medical history, past social history, past surgical history and problem list.    Review of Systems   Constitutional: Negative.    Respiratory: Negative.  Negative for shortness of breath.    Cardiovascular: Negative.  Negative for chest pain and palpitations.   Musculoskeletal: Negative for neck pain.   Skin: Negative.    Neurological: Negative for headaches.       Objective   Physical Exam    Constitutional: She is oriented to person, place, and time. Vital signs are normal. She appears well-developed and well-nourished. No distress.   HENT:   Head: Normocephalic.   Right Ear: External ear normal.   Left Ear: External ear normal.   Nose: Nose normal.   Mouth/Throat: Oropharynx is clear and moist.   Eyes: EOM are normal. Pupils are equal, round, and reactive to light.   Neck: Normal range of motion. Neck supple. No JVD present. No thyromegaly present.   Cardiovascular: Normal rate and regular rhythm.  Exam reveals no gallop and no friction rub.    No murmur heard.  Pulmonary/Chest: Effort normal and breath sounds normal. No respiratory distress. She has no wheezes. She has no rales.   Musculoskeletal: Normal range of motion.   Neurological: She is alert and oriented to person, place, and time.   Skin: Skin is warm and dry. No rash noted. She is not diaphoretic. No erythema. No pallor.   Psychiatric: She has a normal mood and affect. Her behavior is normal. Judgment and thought content normal.   Nursing note and vitals reviewed.      Assessment/Plan   Brenda was seen today for follow-up, diabetes and hypertension.    Diagnoses and all orders for this visit:    Type 2 diabetes mellitus with hypoglycemia without coma, with long-term current use of insulin  -     Ambulatory Referral for Diabetic Eye Exam-Ophthalmology  -     Ambulatory Referral to Podiatry    Needs flu shot  -     Flu Vaccine Quad PF 3YR+ (FLUARIX/FLUZONE [id number])    Mixed diabetic hyperlipidemia associated with type 2 diabetes mellitus    Flu shot today in the office.  Referral for diabetic foot and eye exams.  Continue current medications.  Follow up in 3 months for routine follow up.  Follow up sooner for problems/concerns.  Patient verbalized understanding and agreement with plan of care.        This document has been electronically signed by CHEYENNE Lerma on December 13, 2017 12:08 PM

## 2018-01-04 ENCOUNTER — TELEPHONE (OUTPATIENT)
Dept: ENDOCRINOLOGY | Facility: CLINIC | Age: 78
End: 2018-01-04

## 2018-01-09 ENCOUNTER — TELEPHONE (OUTPATIENT)
Dept: ENDOCRINOLOGY | Facility: CLINIC | Age: 78
End: 2018-01-09

## 2018-01-09 DIAGNOSIS — Z79.4 TYPE 2 DIABETES MELLITUS WITH HYPOGLYCEMIA WITHOUT COMA, WITH LONG-TERM CURRENT USE OF INSULIN (HCC): Primary | ICD-10-CM

## 2018-01-09 DIAGNOSIS — E11.649 TYPE 2 DIABETES MELLITUS WITH HYPOGLYCEMIA WITHOUT COMA, WITH LONG-TERM CURRENT USE OF INSULIN (HCC): Primary | ICD-10-CM

## 2018-01-09 NOTE — TELEPHONE ENCOUNTER
PT NEEDS Insulin Glargine (TOUJEO SOLOSTAR) 300 UNIT/ML solution pen-injector AND   Dulaglutide 0.75 MG/0.5ML solution pen-injector SENT TO [facility]. CALL [phone]   FAX# [phone]

## 2018-03-12 ENCOUNTER — APPOINTMENT (OUTPATIENT)
Dept: LAB | Facility: HOSPITAL | Age: 78
End: 2018-03-12

## 2018-03-12 ENCOUNTER — OFFICE VISIT (OUTPATIENT)
Dept: ENDOCRINOLOGY | Facility: CLINIC | Age: 78
End: 2018-03-12

## 2018-03-12 VITALS
SYSTOLIC BLOOD PRESSURE: 128 MMHG | DIASTOLIC BLOOD PRESSURE: 76 MMHG | WEIGHT: 120.19 LBS | HEIGHT: 59 IN | BODY MASS INDEX: 24.23 KG/M2 | HEART RATE: 73 BPM

## 2018-03-12 DIAGNOSIS — Z79.4 TYPE 2 DIABETES MELLITUS WITH HYPOGLYCEMIA WITHOUT COMA, WITH LONG-TERM CURRENT USE OF INSULIN (HCC): Primary | ICD-10-CM

## 2018-03-12 DIAGNOSIS — E04.2 MULTINODULAR GOITER: ICD-10-CM

## 2018-03-12 DIAGNOSIS — E11.649 TYPE 2 DIABETES MELLITUS WITH HYPOGLYCEMIA WITHOUT COMA, WITH LONG-TERM CURRENT USE OF INSULIN (HCC): Primary | ICD-10-CM

## 2018-03-12 DIAGNOSIS — E11.49 NEUROLOGIC DISORDER ASSOCIATED WITH DIABETES MELLITUS (HCC): ICD-10-CM

## 2018-03-12 DIAGNOSIS — E55.9 VITAMIN D DEFICIENCY: ICD-10-CM

## 2018-03-12 LAB
25(OH)D3 SERPL-MCNC: 22.5 NG/ML (ref 30–100)
ALBUMIN SERPL-MCNC: 4.5 G/DL (ref 3.4–4.8)
ALBUMIN/GLOB SERPL: 1.3 G/DL (ref 1.1–1.8)
ALP SERPL-CCNC: 108 U/L (ref 38–126)
ALT SERPL W P-5'-P-CCNC: 39 U/L (ref 9–52)
ANION GAP SERPL CALCULATED.3IONS-SCNC: 16 MMOL/L (ref 5–15)
AST SERPL-CCNC: 34 U/L (ref 14–36)
BILIRUB SERPL-MCNC: 0.5 MG/DL (ref 0.2–1.3)
BUN BLD-MCNC: 26 MG/DL (ref 7–21)
BUN/CREAT SERPL: 28.9 (ref 7–25)
CALCIUM SPEC-SCNC: 9.5 MG/DL (ref 8.4–10.2)
CHLORIDE SERPL-SCNC: 101 MMOL/L (ref 95–110)
CO2 SERPL-SCNC: 25 MMOL/L (ref 22–31)
CREAT BLD-MCNC: 0.9 MG/DL (ref 0.5–1)
GFR SERPL CREATININE-BSD FRML MDRD: 61 ML/MIN/1.73 (ref 39–90)
GLOBULIN UR ELPH-MCNC: 3.4 GM/DL (ref 2.3–3.5)
GLUCOSE BLD-MCNC: 117 MG/DL (ref 60–100)
HBA1C MFR BLD: 8.5 % (ref 4–5.6)
POTASSIUM BLD-SCNC: 4.7 MMOL/L (ref 3.5–5.1)
PROT SERPL-MCNC: 7.9 G/DL (ref 6.3–8.6)
SODIUM BLD-SCNC: 142 MMOL/L (ref 137–145)
TSH SERPL DL<=0.05 MIU/L-ACNC: 1.14 MIU/ML (ref 0.46–4.68)

## 2018-03-12 PROCEDURE — 36415 COLL VENOUS BLD VENIPUNCTURE: CPT | Performed by: NURSE PRACTITIONER

## 2018-03-12 PROCEDURE — 84443 ASSAY THYROID STIM HORMONE: CPT | Performed by: NURSE PRACTITIONER

## 2018-03-12 PROCEDURE — 82306 VITAMIN D 25 HYDROXY: CPT | Performed by: NURSE PRACTITIONER

## 2018-03-12 PROCEDURE — 83036 HEMOGLOBIN GLYCOSYLATED A1C: CPT | Performed by: NURSE PRACTITIONER

## 2018-03-12 PROCEDURE — 80053 COMPREHEN METABOLIC PANEL: CPT | Performed by: NURSE PRACTITIONER

## 2018-03-12 PROCEDURE — 99214 OFFICE O/P EST MOD 30 MIN: CPT | Performed by: NURSE PRACTITIONER

## 2018-03-12 NOTE — PROGRESS NOTES
"  Subjective    Brenda Garzon is a 77 y.o. female. she is here today for follow-up.    History of Present Illness       Primary Care Provider     CHEYENNE Lerma     Duration since 1978     Timing - Diabetes is Constant     Quality -  needs improvement     Severity -  severe     Complications - hypoglycemia     Current symptoms/problems  hypoglycemia       Alleviating Factors: Compliance       Side Effects  none     Current diet  in general, a \"healthy\" diet       Current exercise walking     Current monitoring regimen: home blood tests - 3 times daily  Home blood sugar records:        This am 139     Most at goal            Hypoglycemia none since last visit                  The following portions of the patient's history were reviewed and updated as appropriate:   Past Medical History:   Diagnosis Date   • Chest pain    • Essential hypertension    • Essential hypertension     unspecified   • GERD (gastroesophageal reflux disease)    • Hiatal hernia    • Hyperlipidemia    • Hypertensive disorder    • Irregular heartbeat    • Multinodular goiter    • Neck pain    • Neurologic disorder associated with diabetes mellitus    • Postmenopausal state    • Thyroid nodule    • Type 2 diabetes mellitus    • Type 2 diabetes mellitus, uncontrolled    • Urinary tract infectious disease    • Vitamin D deficiency      Past Surgical History:   Procedure Laterality Date   • COLONOSCOPY  11/08/2013    1 polyp in ascending colon; removed by snare cautery polypectomy. Internal & external hemorrhoids found.   • LEG SURGERY Right    • PARTIAL HYSTERECTOMY     • UPPER GASTROINTESTINAL ENDOSCOPY  11/08/2013    Esophageal stricture present. Dilatation performed. Hiatus hernia in esophaugs. Gastritis in stomach. Biopsy taken. Normal duodenum. Biopsy taken.     Family History   Problem Relation Age of Onset   • Hypertension Mother    • Stroke Mother    • Heart attack Father    • Heart disease Father    • Diabetes Sister    • Diabetes " Brother      OB History     No data available        Current Outpatient Prescriptions   Medication Sig Dispense Refill   • ACCU-CHEK YUSRA PLUS test strip Use as directed 200 each 3   • ACCU-CHEK FASTCLIX LANCETS misc Use as directed 200 each 3   • aspirin 81 MG tablet Take 81 mg by mouth daily.     • atorvastatin (LIPITOR) 20 MG tablet Take 1 tablet by mouth  every night 90 tablet 1   • diclofenac (VOLTAREN) 75 MG EC tablet Take 1 tablet by mouth two  times daily 180 tablet 1   • Dulaglutide 0.75 MG/0.5ML solution pen-injector Inject 0.75 mg under the skin 1 (One) Time Per Week. 4 pen 11   • Ergocalciferol (VITAMIN D2 PO) Take 50,000 Units by mouth 1 (one) time per week.     • gabapentin (NEURONTIN) 300 MG capsule Take 1 capsule by mouth Every Night. 90 capsule 3   • Glucose 15 GM/32ML gel Take 32 mL by mouth 1 (One) Time As Needed (hypoglycemia). 96 mL 11   • Insulin Glargine (TOUJEO SOLOSTAR) 300 UNIT/ML solution pen-injector Inject 20 Units under the skin every night at bedtime. 2 pen 11   • losartan (COZAAR) 50 MG tablet Take 1 tablet by mouth two  times daily 180 tablet 1   • meclizine (ANTIVERT) 25 MG tablet Take 0.5-1 tablets by mouth 3 (Three) Times a Day As Needed for dizziness. 90 tablet 0   • metaxalone (SKELAXIN) 800 MG tablet Take 400-800 mg by mouth 3 (three) times a day as needed for muscle spasms.     • metFORMIN ER (GLUCOPHAGE XR) 500 MG 24 hr tablet 1 tablet with supper 30 tablet 11   • omeprazole (priLOSEC) 40 MG capsule Take 1 capsule by mouth  daily 90 capsule 1     No current facility-administered medications for this visit.      Allergies   Allergen Reactions   • Codeine Other (See Comments)     Makes patient very sleepy      Social History     Social History   • Marital status:      Social History Main Topics   • Smoking status: Never Smoker   • Smokeless tobacco: Never Used   • Alcohol use No   • Drug use: No   • Sexual activity: Defer     Other Topics Concern   • Not on file  "      Review of Systems  Review of Systems   Constitutional: Negative for activity change, appetite change, chills and fatigue.   HENT: Negative for congestion, dental problem, sore throat, tinnitus, trouble swallowing and voice change.    Eyes: Negative for pain, discharge and itching.   Respiratory: Negative for apnea, cough and chest tightness.    Cardiovascular: Negative for chest pain, palpitations and leg swelling.   Gastrointestinal: Negative for abdominal distention, abdominal pain, constipation and diarrhea.   Endocrine: Negative for cold intolerance, heat intolerance and polydipsia.   Genitourinary: Negative for decreased urine volume, difficulty urinating, dyspareunia and dysuria.   Musculoskeletal: Negative for arthralgias, gait problem and myalgias.   Skin: Negative for color change and pallor.   Allergic/Immunologic: Negative for environmental allergies and food allergies.   Neurological: Negative for dizziness, seizures, facial asymmetry, speech difficulty and numbness.   Hematological: Negative for adenopathy. Does not bruise/bleed easily.   Psychiatric/Behavioral: Negative for agitation, behavioral problems, self-injury and sleep disturbance. The patient is not hyperactive.         Objective    /76 (BP Location: Left arm, Patient Position: Sitting, Cuff Size: Adult)   Pulse 73   Ht 149.9 cm (59\")   Wt 54.5 kg (120 lb 3 oz)   LMP  (LMP Unknown)   Breastfeeding? No   BMI 24.27 kg/m²   Physical Exam   Constitutional: She is oriented to person, place, and time. She appears well-developed and well-nourished.   HENT:   Head: Normocephalic and atraumatic.   Right Ear: External ear normal.   Left Ear: External ear normal.   Eyes: Conjunctivae and EOM are normal. Pupils are equal, round, and reactive to light.   Neck: Normal range of motion. Neck supple. Thyromegaly present.   Cardiovascular: Normal rate, regular rhythm and normal heart sounds.    Pulmonary/Chest: Effort normal and breath sounds " normal.   Abdominal: Soft. Bowel sounds are normal.   Musculoskeletal: Normal range of motion. She exhibits no edema or deformity.   Neurological: She is alert and oriented to person, place, and time. She has normal reflexes.   Skin: Skin is warm and dry. She is not diaphoretic.   Psychiatric: She has a normal mood and affect. Her behavior is normal.       Lab Review  Glucose   Date Value   09/18/2017 38 mg/dL (C)   02/15/2017 60 mg/dL   09/12/2016 57 mg/dl (L)   05/06/2016 85 mg/dl   02/26/2016 186 mg/dl (H)     Sodium (mmol/L)   Date Value   09/18/2017 141   02/15/2017 139   09/12/2016 145   05/06/2016 141   02/26/2016 138     Potassium (mmol/L)   Date Value   09/18/2017 4.6   02/15/2017 4.8   09/12/2016 5.9 (H)   05/06/2016 5.5 (H)   02/26/2016 4.8     Chloride (mmol/L)   Date Value   09/18/2017 104   02/15/2017 104   09/12/2016 107   05/06/2016 101   02/26/2016 98     CO2 (mmol/L)   Date Value   09/18/2017 26.0   02/15/2017 27.0   09/12/2016 25   05/06/2016 27   02/26/2016 27     BUN   Date Value   09/18/2017 37 mg/dL (H)   02/15/2017 32 mg/dL (H)   09/12/2016 35 mg/dl (H)   05/06/2016 19 mg/dl   02/26/2016 27 mg/dl (H)     Creatinine   Date Value   09/18/2017 1.42 mg/dL (H)   02/15/2017 1.27 mg/dL (H)   09/12/2016 1.3 mg/dl (H)   05/06/2016 1.4 mg/dl (H)   02/26/2016 1.0 mg/dl     Hemoglobin A1C   Date Value   09/18/2017 7.1 % (H)   02/15/2017 9.41 % (H)   09/12/2016 7.5 %TotHgb (H)   05/06/2016 6.8 %TotHgb (H)   02/26/2016 9.1 %TotHgb (H)     Triglycerides   Date Value   09/18/2017 124 mg/dL   09/12/2016 161 mg/dl   09/08/2014 141 mg/dl     LDL Cholesterol    Date Value   09/18/2017 129 mg/dL   09/12/2016 126 mg/dl   09/08/2014 104 mg/dl       Assessment/Plan      1. Type 2 diabetes mellitus with hypoglycemia without coma, with long-term current use of insulin    2. Neurologic disorder associated with diabetes mellitus    3. Vitamin D deficiency    4. Multinodular goiter    .    Medications  prescribed:  Outpatient Encounter Prescriptions as of 3/12/2018   Medication Sig Dispense Refill   • ACCU-CHEK YUSRA PLUS test strip Use as directed 200 each 3   • ACCU-CHEK FASTCLIX LANCETS misc Use as directed 200 each 3   • aspirin 81 MG tablet Take 81 mg by mouth daily.     • atorvastatin (LIPITOR) 20 MG tablet Take 1 tablet by mouth  every night 90 tablet 1   • diclofenac (VOLTAREN) 75 MG EC tablet Take 1 tablet by mouth two  times daily 180 tablet 1   • Dulaglutide 0.75 MG/0.5ML solution pen-injector Inject 0.75 mg under the skin 1 (One) Time Per Week. 4 pen 11   • Ergocalciferol (VITAMIN D2 PO) Take 50,000 Units by mouth 1 (one) time per week.     • gabapentin (NEURONTIN) 300 MG capsule Take 1 capsule by mouth Every Night. 90 capsule 3   • Glucose 15 GM/32ML gel Take 32 mL by mouth 1 (One) Time As Needed (hypoglycemia). 96 mL 11   • Insulin Glargine (TOUJEO SOLOSTAR) 300 UNIT/ML solution pen-injector Inject 20 Units under the skin every night at bedtime. 2 pen 11   • losartan (COZAAR) 50 MG tablet Take 1 tablet by mouth two  times daily 180 tablet 1   • meclizine (ANTIVERT) 25 MG tablet Take 0.5-1 tablets by mouth 3 (Three) Times a Day As Needed for dizziness. 90 tablet 0   • metaxalone (SKELAXIN) 800 MG tablet Take 400-800 mg by mouth 3 (three) times a day as needed for muscle spasms.     • metFORMIN ER (GLUCOPHAGE XR) 500 MG 24 hr tablet 1 tablet with supper 30 tablet 11   • omeprazole (priLOSEC) 40 MG capsule Take 1 capsule by mouth  daily 90 capsule 1     No facility-administered encounter medications on file as of 3/12/2018.        Orders placed during this encounter include:  Orders Placed This Encounter   Procedures   • Hemoglobin A1c   • Comprehensive Metabolic Panel   • TSH   • Vitamin D 25 Hydroxy       Glycemic Management:               Lab Results   Component Value Date     HGBA1C 7.1 (H) 09/18/2017         stopped  lantus 40 -- changed to Toujeo taking 20 units         Stopped janumet            ckd stage III      toujeo 20 ,         trulicity 0.75 mg weekly          metformin 500 mg w supper            Lipid Management     On lipitor 20 mg qhs      Blood Pressure Management:         Controlled on losartan            Microvascular Complication Monitoring:       Eye Exam Evaluation, within 1 year      -----------     Last Microalbumin-Proteinuria Assessment               Lab Results   Component Value Date     [facility]     [facility] 8 09/12/2016            -----------        Neuropathy           Weight Related:               Diet interventions: moderate (500 kCal/d) deficit diet.        Bone Health               Lab Results   Component Value Date     CALCIUM 9.4 09/18/2017     STGJ04XE 30.3 09/18/2017         Thyroid Health  Lab Results   Component Value Date    TSH 2.060 09/18/2017          h o MNG  Obtain US-- reschedule for Natalie Osorio            Other Diabetes Related Aspects         No results found for: [id number]     Suggest otc b12           4. Follow-up: Return in about 4 months (around 7/12/2018) for Recheck.

## 2018-03-21 ENCOUNTER — TELEPHONE (OUTPATIENT)
Dept: ENDOCRINOLOGY | Facility: CLINIC | Age: 78
End: 2018-03-21

## 2018-03-21 RX ORDER — ERGOCALCIFEROL 1.25 MG/1
50000 CAPSULE ORAL
Qty: 5 CAPSULE | Refills: 2 | Status: SHIPPED | OUTPATIENT
Start: 2018-03-21 | End: 2019-04-02 | Stop reason: SDUPTHER

## 2018-03-21 NOTE — TELEPHONE ENCOUNTER
----- Message from CHEYENNE Maddox sent at 3/12/2018  3:19 PM CDT -----  A1c was 8.5 running higher than it should-- check sugars for 5 days and please call me with the readings

## 2018-03-26 ENCOUNTER — TELEPHONE (OUTPATIENT)
Dept: ENDOCRINOLOGY | Facility: CLINIC | Age: 78
End: 2018-03-26

## 2018-03-30 ENCOUNTER — TELEPHONE (OUTPATIENT)
Dept: FAMILY MEDICINE CLINIC | Facility: CLINIC | Age: 78
End: 2018-03-30

## 2018-04-03 ENCOUNTER — TELEPHONE (OUTPATIENT)
Dept: ENDOCRINOLOGY | Facility: CLINIC | Age: 78
End: 2018-04-03

## 2018-04-03 NOTE — TELEPHONE ENCOUNTER
These are not bad except for the noon time; she should try to cut back on her carbs for breakfast     Documentation       You   CHEYENNE Maddox 4 days ago      Sugar readings        Documentation       Jennifer Wilson routed conversation to You 4 days ago      Argentina Lucero [phone]  Jennifer Wilson 4 days ago      Jennifer Wilson 4 days ago      ARGENTINA LUCERO HAS CALLED WITH BLOOD SUGAR READINGS FOR THIS WK STARTING MONDAY  3/26   98  8RS=548   COID=[id number]    GERIVCW=197     3/27  86   2NR=596   CSQZ=666     SJSYSHT=978     3/28  90    6ED=801   EWZG=421    OFECVEE=481     3/29  77    9HB=466   HREI=042    TCDZIHC=143     3/30  113   0SG=821   ALBZ=377     HER# [phone]

## 2018-04-11 ENCOUNTER — OFFICE VISIT (OUTPATIENT)
Dept: FAMILY MEDICINE CLINIC | Facility: CLINIC | Age: 78
End: 2018-04-11

## 2018-04-11 VITALS
RESPIRATION RATE: 20 BRPM | DIASTOLIC BLOOD PRESSURE: 80 MMHG | SYSTOLIC BLOOD PRESSURE: 120 MMHG | BODY MASS INDEX: 23.99 KG/M2 | TEMPERATURE: 97.7 F | HEIGHT: 59 IN | HEART RATE: 84 BPM | OXYGEN SATURATION: 98 % | WEIGHT: 119 LBS

## 2018-04-11 DIAGNOSIS — E78.5 DYSLIPIDEMIA: Chronic | ICD-10-CM

## 2018-04-11 DIAGNOSIS — I10 ESSENTIAL HYPERTENSION: Chronic | ICD-10-CM

## 2018-04-11 DIAGNOSIS — Z79.4 TYPE 2 DIABETES MELLITUS WITH HYPOGLYCEMIA WITHOUT COMA, WITH LONG-TERM CURRENT USE OF INSULIN (HCC): Primary | ICD-10-CM

## 2018-04-11 DIAGNOSIS — Z12.31 ENCOUNTER FOR SCREENING MAMMOGRAM FOR BREAST CANCER: ICD-10-CM

## 2018-04-11 DIAGNOSIS — E04.2 MULTINODULAR GOITER: ICD-10-CM

## 2018-04-11 DIAGNOSIS — E11.649 TYPE 2 DIABETES MELLITUS WITH HYPOGLYCEMIA WITHOUT COMA, WITH LONG-TERM CURRENT USE OF INSULIN (HCC): Primary | ICD-10-CM

## 2018-04-11 DIAGNOSIS — E55.9 VITAMIN D DEFICIENCY: Chronic | ICD-10-CM

## 2018-04-11 DIAGNOSIS — Z78.0 POSTMENOPAUSAL STATE: Chronic | ICD-10-CM

## 2018-04-11 DIAGNOSIS — Z13.820 SCREENING FOR OSTEOPOROSIS: ICD-10-CM

## 2018-04-11 PROCEDURE — 99214 OFFICE O/P EST MOD 30 MIN: CPT | Performed by: NURSE PRACTITIONER

## 2018-04-11 NOTE — PROGRESS NOTES
Subjective   Brenda Garzon is a 77 y.o. female.     She presents today for her routine follow up on chronic medical problems.  BP is well controlled.  Reports her blood sugars have been fairly well controlled.  No recent episodes of hypoglycemia.  She is due for fasting labs, mammo, DXA, Medicare wellness, diabetic eye, and diabetic foot exams.  No new complaints today in the office.  Reports she has been doing well since her last visit with me.      Hypertension   This is a chronic problem. The current episode started more than 1 year ago. The problem has been resolved since onset. The problem is controlled. Pertinent negatives include no anxiety, blurred vision, chest pain, headaches, malaise/fatigue, neck pain, orthopnea, palpitations, peripheral edema, PND, shortness of breath or sweats. There are no associated agents to hypertension. Risk factors for coronary artery disease include post-menopausal state, diabetes mellitus, dyslipidemia, family history, sedentary lifestyle and stress. Past treatments include angiotensin blockers. Current antihypertension treatment includes angiotensin blockers. The current treatment provides significant improvement. Compliance problems include diet and exercise.    Diabetes   She presents for her follow-up diabetic visit. She has type 2 diabetes mellitus. Her disease course has been stable. There are no hypoglycemic associated symptoms. Pertinent negatives for hypoglycemia include no headaches or sweats. There are no diabetic associated symptoms. Pertinent negatives for diabetes include no blurred vision and no chest pain. There are no hypoglycemic complications. Symptoms are stable. There are no diabetic complications. Risk factors for coronary artery disease include diabetes mellitus, dyslipidemia, family history, post-menopausal, sedentary lifestyle and stress. Current diabetic treatment includes oral agent (triple therapy). She is compliant with treatment most of the time.  Her weight is stable. She is following a generally healthy diet. She has not had a previous visit with a dietitian. She rarely participates in exercise. An ACE inhibitor/angiotensin II receptor blocker is being taken. She does not see a podiatrist.Eye exam is not current.        The following portions of the patient's history were reviewed and updated as appropriate: allergies, current medications, past family history, past medical history, past social history, past surgical history and problem list.    Review of Systems   Constitutional: Negative.  Negative for malaise/fatigue.   HENT: Negative.    Eyes: Negative.  Negative for blurred vision.   Respiratory: Negative.  Negative for shortness of breath.    Cardiovascular: Negative.  Negative for chest pain, palpitations, orthopnea and PND.   Gastrointestinal: Negative.    Musculoskeletal: Negative.  Negative for neck pain.   Skin: Negative.    Allergic/Immunologic: Negative.    Neurological: Negative.  Negative for headaches.   Hematological: Negative.    Psychiatric/Behavioral: Negative.        Objective   Physical Exam   Constitutional: She is oriented to person, place, and time. Vital signs are normal. She appears well-developed and well-nourished. No distress.   HENT:   Head: Normocephalic.   Right Ear: External ear normal.   Left Ear: External ear normal.   Nose: Nose normal.   Mouth/Throat: Oropharynx is clear and moist. No oropharyngeal exudate.   Eyes: Conjunctivae and EOM are normal. Pupils are equal, round, and reactive to light. Right eye exhibits no discharge. Left eye exhibits no discharge.   Neck: Normal range of motion. Neck supple. No tracheal deviation present. No thyromegaly present.   Cardiovascular: Normal rate, regular rhythm and normal heart sounds.  Exam reveals no gallop and no friction rub.    No murmur heard.  Pulmonary/Chest: Effort normal and breath sounds normal. No respiratory distress. She has no wheezes. She has no rales. She exhibits  no tenderness.   Musculoskeletal: Normal range of motion.   Lymphadenopathy:     She has no cervical adenopathy.   Neurological: She is alert and oriented to person, place, and time.   Skin: Skin is warm and dry. Capillary refill takes less than 2 seconds. No rash noted. She is not diaphoretic. No erythema. No pallor.   Psychiatric: She has a normal mood and affect. Her behavior is normal. Judgment and thought content normal.   Nursing note and vitals reviewed.        Assessment/Plan   Brenda was seen today for follow-up, hypertension and diabetes.    Diagnoses and all orders for this visit:    Type 2 diabetes mellitus with hypoglycemia without coma, with long-term current use of insulin  -     Ambulatory Referral for Diabetic Eye Exam-Ophthalmology  -     Ambulatory Referral to Podiatry  -     CBC (No Diff)  -     Comprehensive Metabolic Panel  -     Hemoglobin A1c  -     Microalbumin / Creatinine Urine Ratio - Urine, Clean Catch    Dyslipidemia  -     Lipid Panel    Multinodular goiter  -     TSH    Vitamin D deficiency  -     Vitamin D 25 Hydroxy    Essential hypertension  -     CBC (No Diff)  -     Comprehensive Metabolic Panel  -     Microalbumin / Creatinine Urine Ratio - Urine, Clean Catch    Postmenopausal state  -     DEXA Bone Density Axial; Future    Encounter for screening mammogram for breast cancer  -     Mammo Screening Bilateral With CAD; Future    Screening for osteoporosis  -     DEXA Bone Density Axial; Future    Fasting labs.  Schedule for screening mammo and DXA when due.  Schedule for diabetic foot and eye exams.  Continue current medications.  Follow up in 3 months for routine follow up.  Follow up as scheduled for Medicare wellness exam.  Follow up sooner for problems/concerns.  Patient verbalized understanding and agreement with plan of care.        This document has been electronically signed by CHEYENNE Lerma on April 11, 2018 2:03 PM

## 2018-04-11 NOTE — PATIENT INSTRUCTIONS
Preventive Care 65 Years and Older, Female  Preventive care refers to lifestyle choices and visits with your health care provider that can promote health and wellness.  What does preventive care include?  · A yearly physical exam. This is also called an annual well check.  · Dental exams once or twice a year.  · Routine eye exams. Ask your health care provider how often you should have your eyes checked.  · Personal lifestyle choices, including:  ¨ Daily care of your teeth and gums.  ¨ Regular physical activity.  ¨ Eating a healthy diet.  ¨ Avoiding tobacco and drug use.  ¨ Limiting alcohol use.  ¨ Practicing safe sex.  ¨ Taking low-dose aspirin every day.  ¨ Taking vitamin and mineral supplements as recommended by your health care provider.  What happens during an annual well check?  The services and screenings done by your health care provider during your annual well check will depend on your age, overall health, lifestyle risk factors, and family history of disease.  Counseling   Your health care provider may ask you questions about your:  · Alcohol use.  · Tobacco use.  · Drug use.  · Emotional well-being.  · Home and relationship well-being.  · Sexual activity.  · Eating habits.  · History of falls.  · Memory and ability to understand (cognition).  · Work and work environment.  · Reproductive health.  Screening   You may have the following tests or measurements:  · Height, weight, and BMI.  · Blood pressure.  · Lipid and cholesterol levels. These may be checked every 5 years, or more frequently if you are over 50 years old.  · Skin check.  · Lung cancer screening. You may have this screening every year starting at age 55 if you have a 30-pack-year history of smoking and currently smoke or have quit within the past 15 years.  · Fecal occult blood test (FOBT) of the stool. You may have this test every year starting at age 50.  · Flexible sigmoidoscopy or colonoscopy. You may have a sigmoidoscopy every 5 years or  a colonoscopy every 10 years starting at age 50.  · Hepatitis C blood test.  · Hepatitis B blood test.  · Sexually transmitted disease (STD) testing.  · Diabetes screening. This is done by checking your blood sugar (glucose) after you have not eaten for a while (fasting). You may have this done every 1-3 years.  · Bone density scan. This is done to screen for osteoporosis. You may have this done starting at age 65.  · Mammogram. This may be done every 1-2 years. Talk to your health care provider about how often you should have regular mammograms.  Talk with your health care provider about your test results, treatment options, and if necessary, the need for more tests.  Vaccines   Your health care provider may recommend certain vaccines, such as:  · Influenza vaccine. This is recommended every year.  · Tetanus, diphtheria, and acellular pertussis (Tdap, Td) vaccine. You may need a Td booster every 10 years.  · Varicella vaccine. You may need this if you have not been vaccinated.  · Zoster vaccine. You may need this after age 60.  · Measles, mumps, and rubella (MMR) vaccine. You may need at least one dose of MMR if you were born in 1957 or later. You may also need a second dose.  · Pneumococcal 13-valent conjugate (PCV13) vaccine. One dose is recommended after age 65.  · Pneumococcal polysaccharide (PPSV23) vaccine. One dose is recommended after age 65.  · Meningococcal vaccine. You may need this if you have certain conditions.  · Hepatitis A vaccine. You may need this if you have certain conditions or if you travel or work in places where you may be exposed to hepatitis A.  · Hepatitis B vaccine. You may need this if you have certain conditions or if you travel or work in places where you may be exposed to hepatitis B.  · Haemophilus influenzae type b (Hib) vaccine. You may need this if you have certain conditions.  Talk to your health care provider about which screenings and vaccines you need and how often you need  them.  This information is not intended to replace advice given to you by your health care provider. Make sure you discuss any questions you have with your health care provider.  Document Released: 01/13/2017 Document Revised: 09/06/2017 Document Reviewed: 10/18/2016  [employer] Interactive Patient Education © 2017 [employer]

## 2018-04-17 PROCEDURE — 84443 ASSAY THYROID STIM HORMONE: CPT | Performed by: NURSE PRACTITIONER

## 2018-04-17 PROCEDURE — 82306 VITAMIN D 25 HYDROXY: CPT | Performed by: NURSE PRACTITIONER

## 2018-04-17 PROCEDURE — 85027 COMPLETE CBC AUTOMATED: CPT | Performed by: NURSE PRACTITIONER

## 2018-04-17 PROCEDURE — 80053 COMPREHEN METABOLIC PANEL: CPT | Performed by: NURSE PRACTITIONER

## 2018-04-17 PROCEDURE — 82043 UR ALBUMIN QUANTITATIVE: CPT | Performed by: NURSE PRACTITIONER

## 2018-04-17 PROCEDURE — 80061 LIPID PANEL: CPT | Performed by: NURSE PRACTITIONER

## 2018-04-17 PROCEDURE — 36415 COLL VENOUS BLD VENIPUNCTURE: CPT | Performed by: NURSE PRACTITIONER

## 2018-04-17 PROCEDURE — 83036 HEMOGLOBIN GLYCOSYLATED A1C: CPT | Performed by: NURSE PRACTITIONER

## 2018-04-17 PROCEDURE — 82570 ASSAY OF URINE CREATININE: CPT | Performed by: NURSE PRACTITIONER

## 2018-04-18 DIAGNOSIS — R80.9 MICROALBUMINURIA: ICD-10-CM

## 2018-04-18 DIAGNOSIS — I10 ESSENTIAL HYPERTENSION: Primary | Chronic | ICD-10-CM

## 2018-04-18 DIAGNOSIS — N18.30 CKD (CHRONIC KIDNEY DISEASE) STAGE 3, GFR 30-59 ML/MIN (HCC): ICD-10-CM

## 2018-04-18 LAB
25(OH)D3 SERPL-MCNC: 40.7 NG/ML (ref 30–100)
ALBUMIN SERPL-MCNC: 4.2 G/DL (ref 3.4–4.8)
ALBUMIN UR-MCNC: 4.4 MG/L
ALBUMIN/GLOB SERPL: 1.4 G/DL (ref 1.1–1.8)
ALP SERPL-CCNC: 88 U/L (ref 38–126)
ALT SERPL W P-5'-P-CCNC: 24 U/L (ref 9–52)
ANION GAP SERPL CALCULATED.3IONS-SCNC: 16 MMOL/L (ref 5–15)
ARTICHOKE IGE QN: 111 MG/DL (ref 1–129)
AST SERPL-CCNC: 25 U/L (ref 14–36)
BILIRUB SERPL-MCNC: 0.3 MG/DL (ref 0.2–1.3)
BUN BLD-MCNC: 23 MG/DL (ref 7–21)
BUN/CREAT SERPL: 24.7 (ref 7–25)
CALCIUM SPEC-SCNC: 9.8 MG/DL (ref 8.4–10.2)
CHLORIDE SERPL-SCNC: 104 MMOL/L (ref 95–110)
CHOLEST SERPL-MCNC: 181 MG/DL (ref 0–199)
CO2 SERPL-SCNC: 27 MMOL/L (ref 22–31)
CREAT BLD-MCNC: 0.93 MG/DL (ref 0.5–1)
CREAT UR-MCNC: 48.9 MG/DL
DEPRECATED RDW RBC AUTO: 44.2 FL (ref 36.4–46.3)
ERYTHROCYTE [DISTWIDTH] IN BLOOD BY AUTOMATED COUNT: 14.3 % (ref 11.5–14.5)
GFR SERPL CREATININE-BSD FRML MDRD: 58 ML/MIN/1.73 (ref 39–90)
GLOBULIN UR ELPH-MCNC: 3 GM/DL (ref 2.3–3.5)
GLUCOSE BLD-MCNC: 62 MG/DL (ref 60–100)
HBA1C MFR BLD: 8.3 % (ref 4–5.6)
HCT VFR BLD AUTO: 39.6 % (ref 35–45)
HDLC SERPL-MCNC: 43 MG/DL (ref 60–200)
HGB BLD-MCNC: 12.9 G/DL (ref 12–15.5)
LDLC/HDLC SERPL: 2.67 {RATIO} (ref 0–3.22)
MCH RBC QN AUTO: 27.6 PG (ref 26.5–34)
MCHC RBC AUTO-ENTMCNC: 32.6 G/DL (ref 31.4–36)
MCV RBC AUTO: 84.6 FL (ref 80–98)
MICROALBUMIN/CREAT UR: 90 MG/G (ref 0–30)
PLATELET # BLD AUTO: 185 10*3/MM3 (ref 150–450)
PMV BLD AUTO: 12.2 FL (ref 8–12)
POTASSIUM BLD-SCNC: 5.3 MMOL/L (ref 3.5–5.1)
PROT SERPL-MCNC: 7.2 G/DL (ref 6.3–8.6)
RBC # BLD AUTO: 4.68 10*6/MM3 (ref 3.77–5.16)
SODIUM BLD-SCNC: 147 MMOL/L (ref 137–145)
TRIGL SERPL-MCNC: 116 MG/DL (ref 20–199)
TSH SERPL DL<=0.05 MIU/L-ACNC: 2.07 MIU/ML (ref 0.46–4.68)
WBC NRBC COR # BLD: 9.08 10*3/MM3 (ref 3.2–9.8)

## 2018-04-18 RX ORDER — AMLODIPINE BESYLATE 10 MG/1
10 TABLET ORAL DAILY
Qty: 90 TABLET | Refills: 1 | Status: SHIPPED | OUTPATIENT
Start: 2018-04-18 | End: 2018-04-26

## 2018-04-18 NOTE — PROGRESS NOTES
Her diabetes is still not well controlled.  When does she see endocrinology next?  She has protein in her urine and her GFR is lower than I'd like it.  I am going to send her to nephrology for evaluation.  Her potassium is also high.  I want her to stop taking the losartan and I want her to take amlodipine instead.  I will send this in to her pharmacy now.

## 2018-04-23 ENCOUNTER — TELEPHONE (OUTPATIENT)
Dept: FAMILY MEDICINE CLINIC | Facility: CLINIC | Age: 78
End: 2018-04-23

## 2018-04-23 RX ORDER — LANCETS
EACH MISCELLANEOUS
Qty: 200 EACH | Refills: 1 | Status: SHIPPED | OUTPATIENT
Start: 2018-04-23 | End: 2018-09-18 | Stop reason: SDUPTHER

## 2018-04-23 RX ORDER — LOSARTAN POTASSIUM 50 MG/1
TABLET ORAL
Qty: 180 TABLET | Refills: 1 | Status: SHIPPED | OUTPATIENT
Start: 2018-04-23 | End: 2018-10-02 | Stop reason: SDUPTHER

## 2018-04-23 RX ORDER — OMEPRAZOLE 40 MG/1
CAPSULE, DELAYED RELEASE ORAL
Qty: 90 CAPSULE | Refills: 1 | Status: SHIPPED | OUTPATIENT
Start: 2018-04-23 | End: 2018-06-12 | Stop reason: SINTOL

## 2018-04-23 RX ORDER — BLOOD SUGAR DIAGNOSTIC
STRIP MISCELLANEOUS
Qty: 200 EACH | Refills: 1 | Status: SHIPPED | OUTPATIENT
Start: 2018-04-23 | End: 2018-09-18 | Stop reason: SDUPTHER

## 2018-04-23 NOTE — TELEPHONE ENCOUNTER
----- Message from CHEYENNE Lerma sent at 4/18/2018  4:11 PM CDT -----  Her diabetes is still not well controlled.  When does she see endocrinology next?  She has protein in her urine and her GFR is lower than I'd like it.  I am going to send her to nephrology for evaluation.  Her potassium is also high.  I want her to s  top taking the losartan and I want her to take amlodipine instead.  I will send this in to her pharmacy now.

## 2018-04-26 ENCOUNTER — OFFICE VISIT (OUTPATIENT)
Dept: ENDOCRINOLOGY | Facility: CLINIC | Age: 78
End: 2018-04-26

## 2018-04-26 VITALS
HEART RATE: 91 BPM | OXYGEN SATURATION: 98 % | DIASTOLIC BLOOD PRESSURE: 78 MMHG | SYSTOLIC BLOOD PRESSURE: 116 MMHG | BODY MASS INDEX: 23.87 KG/M2 | HEIGHT: 59 IN | WEIGHT: 118.4 LBS

## 2018-04-26 DIAGNOSIS — E78.2 MIXED DIABETIC HYPERLIPIDEMIA ASSOCIATED WITH TYPE 2 DIABETES MELLITUS (HCC): Primary | ICD-10-CM

## 2018-04-26 DIAGNOSIS — E11.649 TYPE 2 DIABETES MELLITUS WITH HYPOGLYCEMIA WITHOUT COMA, WITH LONG-TERM CURRENT USE OF INSULIN (HCC): ICD-10-CM

## 2018-04-26 DIAGNOSIS — E55.9 VITAMIN D DEFICIENCY: ICD-10-CM

## 2018-04-26 DIAGNOSIS — Z79.4 TYPE 2 DIABETES MELLITUS WITH HYPOGLYCEMIA WITHOUT COMA, WITH LONG-TERM CURRENT USE OF INSULIN (HCC): ICD-10-CM

## 2018-04-26 DIAGNOSIS — E11.59 HYPERTENSION ASSOCIATED WITH DIABETES (HCC): ICD-10-CM

## 2018-04-26 DIAGNOSIS — E11.69 MIXED DIABETIC HYPERLIPIDEMIA ASSOCIATED WITH TYPE 2 DIABETES MELLITUS (HCC): Primary | ICD-10-CM

## 2018-04-26 DIAGNOSIS — E04.2 MULTINODULAR GOITER: ICD-10-CM

## 2018-04-26 DIAGNOSIS — I15.2 HYPERTENSION ASSOCIATED WITH DIABETES (HCC): ICD-10-CM

## 2018-04-26 LAB — GLUCOSE BLDC GLUCOMTR-MCNC: 81 MG/DL (ref 70–130)

## 2018-04-26 PROCEDURE — 99214 OFFICE O/P EST MOD 30 MIN: CPT | Performed by: INTERNAL MEDICINE

## 2018-04-26 PROCEDURE — 82962 GLUCOSE BLOOD TEST: CPT | Performed by: INTERNAL MEDICINE

## 2018-04-26 RX ORDER — METFORMIN HYDROCHLORIDE 500 MG/1
TABLET, EXTENDED RELEASE ORAL
Qty: 180 TABLET | Refills: 11 | Status: SHIPPED | OUTPATIENT
Start: 2018-04-26 | End: 2019-09-25 | Stop reason: SDUPTHER

## 2018-04-26 RX ORDER — AMLODIPINE BESYLATE 5 MG/1
5 TABLET ORAL DAILY
Qty: 90 TABLET | Refills: 3 | Status: SHIPPED | OUTPATIENT
Start: 2018-04-26 | End: 2018-05-09 | Stop reason: SINTOL

## 2018-05-08 ENCOUNTER — TELEPHONE (OUTPATIENT)
Dept: ENDOCRINOLOGY | Facility: CLINIC | Age: 78
End: 2018-05-08

## 2018-05-08 NOTE — TELEPHONE ENCOUNTER
----- Message from Edgar Romero MD sent at 5/5/2018  2:58 PM CDT -----  I think we should start with 5 and if that is not enough go to 10   ----- Message -----  From: Kayla Rodas MA  Sent: 5/1/2018   4:17 PM  To: Edgar Romero MD    Pharmacy has called and said that Joide Mcfadden had sent an RX for Ampodipine 10 mg and then you sent 5 mg. ???  Thanks, Kayla

## 2018-05-09 ENCOUNTER — OFFICE VISIT (OUTPATIENT)
Dept: FAMILY MEDICINE CLINIC | Facility: CLINIC | Age: 78
End: 2018-05-09

## 2018-05-09 VITALS
WEIGHT: 116.6 LBS | OXYGEN SATURATION: 96 % | DIASTOLIC BLOOD PRESSURE: 80 MMHG | TEMPERATURE: 98.3 F | HEART RATE: 85 BPM | BODY MASS INDEX: 23.51 KG/M2 | HEIGHT: 59 IN | RESPIRATION RATE: 20 BRPM | SYSTOLIC BLOOD PRESSURE: 142 MMHG

## 2018-05-09 DIAGNOSIS — I10 ESSENTIAL HYPERTENSION: Primary | Chronic | ICD-10-CM

## 2018-05-09 DIAGNOSIS — E11.69 MIXED DIABETIC HYPERLIPIDEMIA ASSOCIATED WITH TYPE 2 DIABETES MELLITUS (HCC): ICD-10-CM

## 2018-05-09 DIAGNOSIS — E78.2 MIXED DIABETIC HYPERLIPIDEMIA ASSOCIATED WITH TYPE 2 DIABETES MELLITUS (HCC): ICD-10-CM

## 2018-05-09 DIAGNOSIS — Z79.4 TYPE 2 DIABETES MELLITUS WITH HYPOGLYCEMIA WITHOUT COMA, WITH LONG-TERM CURRENT USE OF INSULIN (HCC): ICD-10-CM

## 2018-05-09 DIAGNOSIS — E11.649 TYPE 2 DIABETES MELLITUS WITH HYPOGLYCEMIA WITHOUT COMA, WITH LONG-TERM CURRENT USE OF INSULIN (HCC): ICD-10-CM

## 2018-05-09 DIAGNOSIS — E55.9 VITAMIN D DEFICIENCY: Chronic | ICD-10-CM

## 2018-05-09 DIAGNOSIS — K44.9 HIATAL HERNIA: ICD-10-CM

## 2018-05-09 PROCEDURE — 99214 OFFICE O/P EST MOD 30 MIN: CPT | Performed by: NURSE PRACTITIONER

## 2018-05-09 NOTE — PATIENT INSTRUCTIONS
Edema  Edema is an abnormal buildup of fluids in your body tissues. Edema is somewhat dependent on gravity to pull the fluid to the lowest place in your body. That makes the condition more common in the legs and thighs (lower extremities). Painless swelling of the feet and ankles is common and becomes more likely as you get older. It is also common in looser tissues, like around your eyes.  When the affected area is squeezed, the fluid may move out of that spot and leave a dent for a few moments. This dent is called pitting.  What are the causes?  There are many possible causes of edema. Eating too much salt and being on your feet or sitting for a long time can cause edema in your legs and ankles. Hot weather may make edema worse. Common medical causes of edema include:  · Heart failure.  · Liver disease.  · Kidney disease.  · Weak blood vessels in your legs.  · Cancer.  · An injury.  · Pregnancy.  · Some medications.  · Obesity.  What are the signs or symptoms?  Edema is usually painless. Your skin may look swollen or shiny.  How is this diagnosed?  Your health care provider may be able to diagnose edema by asking about your medical history and doing a physical exam. You may need to have tests such as X-rays, an electrocardiogram, or blood tests to check for medical conditions that may cause edema.  How is this treated?  Edema treatment depends on the cause. If you have heart, liver, or kidney disease, you need the treatment appropriate for these conditions. General treatment may include:  · Elevation of the affected body part above the level of your heart.  · Compression of the affected body part. Pressure from elastic bandages or support stockings squeezes the tissues and forces fluid back into the blood vessels. This keeps fluid from entering the tissues.  · Restriction of fluid and salt intake.  · Use of a water pill (diuretic). These medications are appropriate only for some types of edema. They pull fluid out  of your body and make you urinate more often. This gets rid of fluid and reduces swelling, but diuretics can have side effects. Only use diuretics as directed by your health care provider.  Follow these instructions at home:  · Keep the affected body part above the level of your heart when you are lying down.  · Do not sit still or stand for prolonged periods.  · Do not put anything directly under your knees when lying down.  · Do not wear constricting clothing or garters on your upper legs.  · Exercise your legs to work the fluid back into your blood vessels. This may help the swelling go down.  · Wear elastic bandages or support stockings to reduce ankle swelling as directed by your health care provider.  · Eat a low-salt diet to reduce fluid if your health care provider recommends it.  · Only take medicines as directed by your health care provider.  Contact a health care provider if:  · Your edema is not responding to treatment.  · You have heart, liver, or kidney disease and notice symptoms of edema.  · You have edema in your legs that does not improve after elevating them.  · You have sudden and unexplained weight gain.  Get help right away if:  · You develop shortness of breath or chest pain.  · You cannot breathe when you lie down.  · You develop pain, redness, or warmth in the swollen areas.  · You have heart, liver, or kidney disease and suddenly get edema.  · You have a fever and your symptoms suddenly get worse.  This information is not intended to replace advice given to you by your health care provider. Make sure you discuss any questions you have with your health care provider.  Document Released: 12/18/2006 Document Revised: 05/25/2017 Document Reviewed: 10/10/2014  [email or web address] Interactive Patient Education © 2017 [employer]

## 2018-05-19 NOTE — PROGRESS NOTES
Subjective   Brenda Garzon is a 77 y.o. female.     She presents today for leg swelling.  Reports the leg swelling began after she started taking the Norvasc.  No other symptoms.  No redness or pain in bilateral legs.  Otherwise she has been doing well.      Leg Swelling   This is a chronic problem. The current episode started 1 to 4 weeks ago. The problem occurs intermittently. The problem has been waxing and waning. Pertinent negatives include no abdominal pain, anorexia, arthralgias, change in bowel habit, chest pain, chills, congestion, coughing, diaphoresis, fatigue, fever, headaches, joint swelling, myalgias, nausea, neck pain, numbness, rash, sore throat, swollen glands, urinary symptoms, vertigo, visual change, vomiting or weakness. Nothing aggravates the symptoms. She has tried nothing for the symptoms. The treatment provided no relief.        The following portions of the patient's history were reviewed and updated as appropriate: allergies, current medications, past family history, past medical history, past social history, past surgical history and problem list.    Review of Systems   Constitutional: Negative.  Negative for chills, diaphoresis, fatigue and fever.   HENT: Negative.  Negative for congestion and sore throat.    Eyes: Negative.    Respiratory: Negative.  Negative for cough.    Cardiovascular: Positive for leg swelling. Negative for chest pain.   Gastrointestinal: Negative.  Negative for abdominal pain, anorexia, change in bowel habit, nausea and vomiting.   Musculoskeletal: Negative.  Negative for arthralgias, joint swelling, myalgias and neck pain.   Skin: Negative.  Negative for rash.   Allergic/Immunologic: Negative.    Neurological: Negative.  Negative for vertigo, weakness and numbness.   Hematological: Negative.    Psychiatric/Behavioral: Negative.        Objective   Physical Exam   Constitutional: She is oriented to person, place, and time. Vital signs are normal. She appears  well-developed and well-nourished. No distress.   HENT:   Head: Normocephalic.   Right Ear: External ear normal.   Left Ear: External ear normal.   Nose: Nose normal.   Mouth/Throat: Oropharynx is clear and moist. No oropharyngeal exudate.   Eyes: Conjunctivae and EOM are normal. Pupils are equal, round, and reactive to light. Right eye exhibits no discharge. Left eye exhibits no discharge.   Neck: Normal range of motion. Neck supple. No tracheal deviation present. No thyromegaly present.   Cardiovascular: Normal rate, regular rhythm and normal heart sounds.  Exam reveals no gallop and no friction rub.    No murmur heard.  Pulmonary/Chest: Effort normal and breath sounds normal. No respiratory distress. She has no wheezes. She has no rales. She exhibits no tenderness.   Musculoskeletal: Normal range of motion.     Vascular Status -  Her right foot exhibits abnormal foot edema. Her left foot exhibits abnormal foot edema.  Lymphadenopathy:     She has no cervical adenopathy.   Neurological: She is alert and oriented to person, place, and time.   Skin: Skin is warm and dry. Capillary refill takes less than 2 seconds. No rash noted. She is not diaphoretic. No erythema. No pallor.   Psychiatric: She has a normal mood and affect. Her behavior is normal. Judgment and thought content normal.   Nursing note and vitals reviewed.        Assessment/Plan   Brenda was seen today for foot swelling.    Diagnoses and all orders for this visit:    Essential hypertension  -     verapamil SR (CALAN-SR) 120 MG CR tablet; Take 1 tablet by mouth Every Night.    Mixed diabetic hyperlipidemia associated with type 2 diabetes mellitus    Vitamin D deficiency    Hiatal hernia    Type 2 diabetes mellitus with hypoglycemia without coma, with long-term current use of insulin    Stop Norvasc.   Start taking Norvasc once daily at night.  Continue all other current medications.  Follow up as scheduled for routine follow up.  Follow up sooner for  problems/concerns.  Patient verbalized understanding and agreement with plan of care.        This document has been electronically signed by CHEYENNE Lerma on May 19, 2018 3:13 PM

## 2018-05-25 DIAGNOSIS — I10 ESSENTIAL HYPERTENSION: Chronic | ICD-10-CM

## 2018-06-11 ENCOUNTER — TELEPHONE (OUTPATIENT)
Dept: FAMILY MEDICINE CLINIC | Facility: CLINIC | Age: 78
End: 2018-06-11

## 2018-06-11 NOTE — TELEPHONE ENCOUNTER
I returned call to Pt's daughter Jennifer she informed that Ms Garzon her Mother saw Dr Forde at [facility], [place] he would like for you to take her off of the Rztrimqt10 mg and Prilosec 40 mg for the protection of her Kidneys.

## 2018-06-19 ENCOUNTER — TELEPHONE (OUTPATIENT)
Dept: FAMILY MEDICINE CLINIC | Facility: CLINIC | Age: 78
End: 2018-06-19

## 2018-06-19 DIAGNOSIS — R12 HEARTBURN: Primary | ICD-10-CM

## 2018-06-19 RX ORDER — RANITIDINE 150 MG/1
150 TABLET ORAL 2 TIMES DAILY
Qty: 180 TABLET | Refills: 1 | Status: SHIPPED | OUTPATIENT
Start: 2018-06-19 | End: 2018-11-19 | Stop reason: SDUPTHER

## 2018-06-27 ENCOUNTER — OFFICE VISIT (OUTPATIENT)
Dept: FAMILY MEDICINE CLINIC | Facility: CLINIC | Age: 78
End: 2018-06-27

## 2018-06-27 VITALS
BODY MASS INDEX: 21.41 KG/M2 | HEIGHT: 59 IN | OXYGEN SATURATION: 99 % | SYSTOLIC BLOOD PRESSURE: 140 MMHG | WEIGHT: 106.2 LBS | TEMPERATURE: 97.9 F | RESPIRATION RATE: 20 BRPM | HEART RATE: 80 BPM | DIASTOLIC BLOOD PRESSURE: 80 MMHG

## 2018-06-27 DIAGNOSIS — M19.90 CHRONIC ARTHRITIS: Primary | ICD-10-CM

## 2018-06-27 DIAGNOSIS — M25.552 LEFT HIP PAIN: ICD-10-CM

## 2018-06-27 PROCEDURE — 99214 OFFICE O/P EST MOD 30 MIN: CPT | Performed by: NURSE PRACTITIONER

## 2018-06-27 RX ORDER — SENNOSIDES 8.6 MG
1300 CAPSULE ORAL EVERY 8 HOURS PRN
Qty: 120 TABLET | Refills: 5 | Status: SHIPPED | OUTPATIENT
Start: 2018-06-27 | End: 2021-01-01 | Stop reason: SDUPTHER

## 2018-06-28 ENCOUNTER — TELEPHONE (OUTPATIENT)
Dept: FAMILY MEDICINE CLINIC | Facility: CLINIC | Age: 78
End: 2018-06-28

## 2018-06-28 NOTE — PROGRESS NOTES
Mild arthritis noted in the left hip.  She also has degenerative changes in the lower back.  She is likely feeling more pain recently because the kidney specialist stopped her arthritis medication.  I did send in some arthritis Tylenol for her.  Find out if this is helping with her symptoms.

## 2018-06-28 NOTE — PROGRESS NOTES
Subjective   Brenda Garzon is a 77 y.o. female.     She presents today for acute left hip pain that she has been experiencing over the last few weeks.  She reports that she was working out in the yard before the pain began.  She also recently was taken off her NSAID by nephrology before the pain began.  Her BP is fairly well controlled today in the office.  No new complaints.  Tolerating all her routine medications without and side effects today in the office.      Hip Pain    The incident occurred more than 1 week ago. There was no injury mechanism. The pain is present in the left hip. The quality of the pain is described as aching. The pain is at a severity of 7/10. The pain is severe. The pain has been constant since onset. Pertinent negatives include no inability to bear weight, loss of motion, loss of sensation, muscle weakness, numbness or tingling. She reports no foreign bodies present. The symptoms are aggravated by weight bearing. She has tried nothing for the symptoms. The treatment provided no relief.        The following portions of the patient's history were reviewed and updated as appropriate: allergies, current medications, past family history, past medical history, past social history, past surgical history and problem list.    Review of Systems   Constitutional: Negative.    HENT: Negative.    Eyes: Negative.    Respiratory: Negative.    Cardiovascular: Negative.    Gastrointestinal: Negative.    Musculoskeletal: Positive for arthralgias (left hip pain).   Skin: Negative.    Allergic/Immunologic: Negative.    Neurological: Negative.  Negative for tingling and numbness.   Hematological: Negative.    Psychiatric/Behavioral: Negative.        Objective   Physical Exam   Constitutional: She is oriented to person, place, and time. Vital signs are normal. She appears well-developed and well-nourished. No distress.   HENT:   Head: Normocephalic.   Right Ear: External ear normal.   Left Ear: External ear  normal.   Nose: Nose normal.   Mouth/Throat: Oropharynx is clear and moist. No oropharyngeal exudate.   Eyes: Conjunctivae and EOM are normal. Pupils are equal, round, and reactive to light. Right eye exhibits no discharge. Left eye exhibits no discharge.   Neck: Normal range of motion. Neck supple. No tracheal deviation present. No thyromegaly present.   Cardiovascular: Normal rate, regular rhythm and normal heart sounds.  Exam reveals no gallop and no friction rub.    No murmur heard.  Pulmonary/Chest: Effort normal and breath sounds normal. No respiratory distress. She has no wheezes. She has no rales. She exhibits no tenderness.   Musculoskeletal:        Left hip: She exhibits decreased range of motion and tenderness.   Lymphadenopathy:     She has no cervical adenopathy.   Neurological: She is alert and oriented to person, place, and time.   Skin: Skin is warm and dry. Capillary refill takes less than 2 seconds. No rash noted. She is not diaphoretic. No erythema. No pallor.   Psychiatric: She has a normal mood and affect. Her behavior is normal. Judgment and thought content normal.   Nursing note and vitals reviewed.        Assessment/Plan   Brenda was seen today for hip pain.    Diagnoses and all orders for this visit:    Chronic arthritis  -     acetaminophen (TYLENOL 8 HOUR ARTHRITIS PAIN) 650 MG 8 hr tablet; Take 2 tablets by mouth Every 8 (Eight) Hours As Needed for Mild Pain .    Left hip pain  -     acetaminophen (TYLENOL 8 HOUR ARTHRITIS PAIN) 650 MG 8 hr tablet; Take 2 tablets by mouth Every 8 (Eight) Hours As Needed for Mild Pain .  -     XR Spine Lumbar 4+ View  -     XR Hip With or Without Pelvis 2 - 3 View Left    Patient's Body mass index is 21.45 kg/m². BMI is within normal parameters. No follow-up required.  X-ray following office visit.  Tylenol arthritis TID for arthritis discomfort.  Continue all other current medications.  Follow up in as scheduled for routine follow up.  Follow up sooner for  problems/concerns.  Patient verbalized understanding and agreement with plan of care.        This document has been electronically signed by CHEYENNE Lerma on June 28, 2018 12:10 PM

## 2018-07-02 ENCOUNTER — TELEPHONE (OUTPATIENT)
Dept: FAMILY MEDICINE CLINIC | Facility: CLINIC | Age: 78
End: 2018-07-02

## 2018-07-02 NOTE — TELEPHONE ENCOUNTER
----- Message from CHEYENNE Lerma sent at 6/28/2018 11:09 AM CDT -----  Mild arthritis noted in the left hip.  She also has degenerative changes in the lower back.  She is likely feeling more pain recently because the kidney specialist stopped her arthritis medication.  I did send in some arthritis Tylenol for her.  Find   out if this is helping with her symptoms.

## 2018-07-05 ENCOUNTER — OFFICE VISIT (OUTPATIENT)
Dept: ENDOCRINOLOGY | Facility: CLINIC | Age: 78
End: 2018-07-05

## 2018-07-05 VITALS
DIASTOLIC BLOOD PRESSURE: 70 MMHG | HEIGHT: 59 IN | HEART RATE: 50 BPM | WEIGHT: 107 LBS | BODY MASS INDEX: 21.57 KG/M2 | SYSTOLIC BLOOD PRESSURE: 118 MMHG

## 2018-07-05 DIAGNOSIS — IMO0002 UNCONTROLLED TYPE 2 DIABETES MELLITUS WITH COMPLICATION, WITH LONG-TERM CURRENT USE OF INSULIN: Primary | Chronic | ICD-10-CM

## 2018-07-05 DIAGNOSIS — I10 ESSENTIAL HYPERTENSION: Chronic | ICD-10-CM

## 2018-07-05 DIAGNOSIS — E78.5 DYSLIPIDEMIA: Chronic | ICD-10-CM

## 2018-07-05 DIAGNOSIS — E55.9 VITAMIN D DEFICIENCY: Chronic | ICD-10-CM

## 2018-07-05 PROCEDURE — 99214 OFFICE O/P EST MOD 30 MIN: CPT | Performed by: NURSE PRACTITIONER

## 2018-07-05 NOTE — PROGRESS NOTES
"  Subjective    Brenda Garzon is a 77 y.o. female. she is here today for follow-up.    History of Present Illness       Primary Care Provider     CHEYENNE Lerma     Duration since 1978     Timing - Diabetes is Constant     Quality -  needs improvement,             Lab Results   Component Value Date     HGBA1C 8.3 (H) 04/17/2018            Severity -  severe     Complications - hypoglycemia in the past       Alleviating Factors: Compliance       Side Effects  none     Current diet  in general, a \"healthy\" diet       Current exercise walking     Current monitoring regimen: home blood tests - 3 times daily      Home blood sugar records:         this am 90     The highest 156          Hypoglycemia none since last visit                 The following portions of the patient's history were reviewed and updated as appropriate:   Past Medical History:   Diagnosis Date   • Chest pain    • Essential hypertension    • Essential hypertension     unspecified   • GERD (gastroesophageal reflux disease)    • Hiatal hernia    • Hyperlipidemia    • Hypertensive disorder    • Irregular heartbeat    • Multinodular goiter    • Neck pain    • 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 Internal & external hemorrhoids found.   • LEG SURGERY Right    • PARTIAL HYSTERECTOMY     • UPPER GASTROINTESTINAL ENDOSCOPY  11/08/2013    Esophageal stricture present. Dilatation performed. Hiatus hernia in esophaugs. Gastritis in stomach. Biopsy taken. Normal duodenum. Biopsy taken.     Family History   Problem Relation Age of Onset   • Hypertension Mother    • Stroke Mother  "   • Heart attack Father    • Heart disease Father    • Diabetes Sister    • Diabetes Brother      OB History     No data available        Current Outpatient Prescriptions   Medication Sig Dispense Refill   • ACCU-CHEK YUSRA PLUS test strip USE AS DIRECTED 200 each 1   • ACCU-CHEK FASTCLIX LANCETS misc USE AS DIRECTED 200 each 1   • acetaminophen (TYLENOL 8 HOUR ARTHRITIS PAIN) 650 MG 8 hr tablet Take 2 tablets by mouth Every 8 (Eight) Hours As Needed for Mild Pain . 120 tablet 5   • aspirin 81 MG tablet Take 81 mg by mouth daily.     • atorvastatin (LIPITOR) 20 MG tablet Take 1 tablet by mouth  every night 90 tablet 1   • Dulaglutide 1.5 MG/0.5ML solution pen-injector Inject 1.5 mg under the skin 1 (One) Time Per Week. 12 pen 11   • gabapentin (NEURONTIN) 300 MG capsule Take 1 capsule by mouth Every Night. 90 capsule 3   • Glucose 15 GM/32ML gel Take 32 mL by mouth 1 (One) Time As Needed (hypoglycemia). 96 mL 11   • Insulin Glargine (TOUJEO SOLOSTAR) 300 UNIT/ML solution pen-injector Inject 15 Units under the skin every night at bedtime. 3 pen 11   • losartan (COZAAR) 50 MG tablet TAKE 1 TABLET BY MOUTH TWO  TIMES DAILY 180 tablet 1   • meclizine (ANTIVERT) 25 MG tablet Take 0.5-1 tablets by mouth 3 (Three) Times a Day As Needed for dizziness. 90 tablet 0   • metaxalone (SKELAXIN) 800 MG tablet Take 400-800 mg by mouth 3 (three) times a day as needed for muscle spasms.     • metFORMIN ER (GLUCOPHAGE XR) 500 MG 24 hr tablet 1 with bkfast and 1 w supper 180 tablet 11   • raNITIdine (ZANTAC) 150 MG tablet Take 1 tablet by mouth 2 (Two) Times a Day. 180 tablet 1   • verapamil SR (CALAN-SR) 120 MG CR tablet Take 1 tablet by mouth Every Night. 90 tablet 3   • vitamin D (ERGOCALCIFEROL) 62566 units capsule capsule Take 1 capsule by mouth Every 7 (Seven) Days. 5 capsule 2     No current facility-administered medications for this visit.      Allergies   Allergen Reactions   • Codeine Other (See Comments)     Makes patient  "very sleepy      Social History     Social History   • Marital status:      Social History Main Topics   • Smoking status: Never Smoker   • Smokeless tobacco: Never Used   • Alcohol use No   • Drug use: No   • Sexual activity: Defer     Other Topics Concern   • Not on file       Review of Systems  Review of Systems   Constitutional: Negative for activity change, appetite change, diaphoresis and fatigue.   HENT: Negative for facial swelling, sneezing, sore throat, tinnitus, trouble swallowing and voice change.    Eyes: Negative for photophobia, pain, discharge, redness, itching and visual disturbance.   Respiratory: Negative for apnea, cough, choking, chest tightness and shortness of breath.    Cardiovascular: Negative for chest pain, palpitations and leg swelling.   Gastrointestinal: Negative for abdominal distention, abdominal pain, constipation, diarrhea, nausea and vomiting.   Endocrine: Negative for cold intolerance, heat intolerance, polydipsia, polyphagia and polyuria.   Genitourinary: Negative for difficulty urinating, dysuria, frequency, hematuria and urgency.   Musculoskeletal: Negative for arthralgias, back pain, gait problem, joint swelling, myalgias, neck pain and neck stiffness.   Skin: Negative for color change, pallor, rash and wound.   Neurological: Negative for dizziness, tremors, weakness, light-headedness, numbness and headaches.   Hematological: Negative for adenopathy. Does not bruise/bleed easily.   Psychiatric/Behavioral: Negative for behavioral problems, confusion and sleep disturbance.        Objective    /70 (BP Location: Right arm, Patient Position: Sitting, Cuff Size: Adult)   Pulse 50   Ht 149.9 cm (59\")   Wt 48.5 kg (107 lb)   LMP  (LMP Unknown)   BMI 21.61 kg/m²   Physical Exam   Constitutional: She is oriented to person, place, and time. She appears well-developed and well-nourished. No distress.   HENT:   Head: Normocephalic and atraumatic.   Right Ear: External ear " normal.   Left Ear: External ear normal.   Nose: Nose normal.   Eyes: Conjunctivae and EOM are normal. Pupils are equal, round, and reactive to light.   Neck: Normal range of motion. Neck supple. No tracheal deviation present. No thyromegaly present.   Cardiovascular: Normal rate, regular rhythm and normal heart sounds.    No murmur heard.  Pulmonary/Chest: Effort normal and breath sounds normal. No respiratory distress. She has no wheezes.   Abdominal: Soft. Bowel sounds are normal. There is no tenderness. There is no rebound and no guarding.   Musculoskeletal: Normal range of motion. She exhibits no edema, tenderness or deformity.   Neurological: She is alert and oriented to person, place, and time. No cranial nerve deficit.   Skin: Skin is warm and dry. No rash noted.   Psychiatric: She has a normal mood and affect. Her behavior is normal. Judgment and thought content normal.       Lab Review  Glucose (mg/dL)   Date Value   04/17/2018 62   03/12/2018 117 (H)   09/18/2017 38 (C)     Sodium (mmol/L)   Date Value   04/17/2018 147 (H)   03/12/2018 142   09/18/2017 141     Potassium (mmol/L)   Date Value   04/17/2018 5.3 (H)   03/12/2018 4.7   09/18/2017 4.6     Chloride (mmol/L)   Date Value   04/17/2018 104   03/12/2018 101   09/18/2017 104     CO2 (mmol/L)   Date Value   04/17/2018 27.0   03/12/2018 25.0   09/18/2017 26.0     BUN (mg/dL)   Date Value   04/17/2018 23 (H)   03/12/2018 26 (H)   09/18/2017 37 (H)     Creatinine (mg/dL)   Date Value   04/17/2018 0.93   03/12/2018 0.90   09/18/2017 1.42 (H)     Hemoglobin A1C (%)   Date Value   04/17/2018 8.3 (H)   03/12/2018 8.5 (H)   09/18/2017 7.1 (H)     Triglycerides   Date Value   04/17/2018 116 mg/dL   09/18/2017 124 mg/dL   09/12/2016 161 mg/dl   09/08/2014 141 mg/dl     LDL Cholesterol    Date Value   04/17/2018 111 mg/dL   09/18/2017 129 mg/dL   09/12/2016 126 mg/dl   09/08/2014 104 mg/dl       Assessment/Plan      1. Uncontrolled type 2 diabetes mellitus with  complication, with long-term current use of insulin (CMS/[facility])    2. Dyslipidemia    3. Essential hypertension    4. Vitamin D deficiency    .    Medications prescribed:  Outpatient Encounter Prescriptions as of 7/5/2018   Medication Sig Dispense Refill   • ACCU-CHEK YUSRA PLUS test strip USE AS DIRECTED 200 each 1   • ACCU-CHEK FASTCLIX LANCETS misc USE AS DIRECTED 200 each 1   • acetaminophen (TYLENOL 8 HOUR ARTHRITIS PAIN) 650 MG 8 hr tablet Take 2 tablets by mouth Every 8 (Eight) Hours As Needed for Mild Pain . 120 tablet 5   • aspirin 81 MG tablet Take 81 mg by mouth daily.     • atorvastatin (LIPITOR) 20 MG tablet Take 1 tablet by mouth  every night 90 tablet 1   • Dulaglutide 1.5 MG/0.5ML solution pen-injector Inject 1.5 mg under the skin 1 (One) Time Per Week. 12 pen 11   • gabapentin (NEURONTIN) 300 MG capsule Take 1 capsule by mouth Every Night. 90 capsule 3   • Glucose 15 GM/32ML gel Take 32 mL by mouth 1 (One) Time As Needed (hypoglycemia). 96 mL 11   • Insulin Glargine (TOUJEO SOLOSTAR) 300 UNIT/ML solution pen-injector Inject 15 Units under the skin every night at bedtime. 3 pen 11   • losartan (COZAAR) 50 MG tablet TAKE 1 TABLET BY MOUTH TWO  TIMES DAILY 180 tablet 1   • meclizine (ANTIVERT) 25 MG tablet Take 0.5-1 tablets by mouth 3 (Three) Times a Day As Needed for dizziness. 90 tablet 0   • metaxalone (SKELAXIN) 800 MG tablet Take 400-800 mg by mouth 3 (three) times a day as needed for muscle spasms.     • metFORMIN ER (GLUCOPHAGE XR) 500 MG 24 hr tablet 1 with bkfast and 1 w supper 180 tablet 11   • raNITIdine (ZANTAC) 150 MG tablet Take 1 tablet by mouth 2 (Two) Times a Day. 180 tablet 1   • verapamil SR (CALAN-SR) 120 MG CR tablet Take 1 tablet by mouth Every Night. 90 tablet 3   • vitamin D (ERGOCALCIFEROL) 89895 units capsule capsule Take 1 capsule by mouth Every 7 (Seven) Days. 5 capsule 2     No facility-administered encounter medications on file as of 7/5/2018.        Orders placed during  this encounter include:  No orders of the defined types were placed in this encounter.    Glycemic Management:      Lab Results   Component Value Date    HGBA1C 8.3 (H) 04/17/2018            I would rather have an Aic of 8.3 without lows than a 7.1 with lows      We need better prandial control      Toujeo 20 , fasting low on April 17 fasting test - change to 15     trulicity 0.75 mg weekly - increase to 1.5 mg weekly      metformin 500 mg w supper - change to w bkfast and w supper                Lipid Management     On lipitor 20 mg qhs      Blood Pressure Management:      Was on losartan 50 , due to elevated K she was changed to norvasc       Norvasc 5 mg one daily            Microvascular Complication Monitoring:       Eye Exam Evaluation, within 1 year      -----------     Last Microalbumin-Proteinuria Assessment     GFR is 52  Microalbuminuria           -----------        Neuropathy, none              Bone Health           Lab Results   Component Value Date     FCMI27GK 40.7 04/17/2018     ZQUJ66GL 22.5 (L) 03/12/2018     OYYN51LL 30.3 09/18/2017            Thyroid Health           Lab Results   Component Value Date     TSH 2.070 04/17/2018         h o MNG        Had US and told normal         Other Diabetes Related Aspects         No results found for: [id number]    On b12         4. Follow-up: Return in about 3 months (around 10/5/2018) for Recheck.

## 2018-07-09 ENCOUNTER — TELEPHONE (OUTPATIENT)
Dept: FAMILY MEDICINE CLINIC | Facility: CLINIC | Age: 78
End: 2018-07-09

## 2018-07-11 RX ORDER — ATORVASTATIN CALCIUM 20 MG/1
TABLET, FILM COATED ORAL
Qty: 90 TABLET | Refills: 1 | Status: SHIPPED | OUTPATIENT
Start: 2018-07-11 | End: 2018-12-07 | Stop reason: SDUPTHER

## 2018-07-11 RX ORDER — DICLOFENAC SODIUM 75 MG/1
TABLET, DELAYED RELEASE ORAL
Qty: 180 TABLET | Refills: 1 | Status: SHIPPED | OUTPATIENT
Start: 2018-07-11 | End: 2018-08-01 | Stop reason: SINTOL

## 2018-07-19 ENCOUNTER — OFFICE VISIT (OUTPATIENT)
Dept: FAMILY MEDICINE CLINIC | Facility: CLINIC | Age: 78
End: 2018-07-19

## 2018-07-19 VITALS
TEMPERATURE: 98.4 F | BODY MASS INDEX: 21.35 KG/M2 | HEART RATE: 54 BPM | DIASTOLIC BLOOD PRESSURE: 78 MMHG | WEIGHT: 105.9 LBS | HEIGHT: 59 IN | OXYGEN SATURATION: 97 % | SYSTOLIC BLOOD PRESSURE: 138 MMHG

## 2018-07-19 DIAGNOSIS — Z79.4 TYPE 2 DIABETES MELLITUS WITH HYPOGLYCEMIA WITHOUT COMA, WITH LONG-TERM CURRENT USE OF INSULIN (HCC): ICD-10-CM

## 2018-07-19 DIAGNOSIS — E11.59 HYPERTENSION ASSOCIATED WITH DIABETES (HCC): ICD-10-CM

## 2018-07-19 DIAGNOSIS — I15.2 HYPERTENSION ASSOCIATED WITH DIABETES (HCC): ICD-10-CM

## 2018-07-19 DIAGNOSIS — E78.5 DYSLIPIDEMIA: Chronic | ICD-10-CM

## 2018-07-19 DIAGNOSIS — G44.209 ACUTE NON INTRACTABLE TENSION-TYPE HEADACHE: Primary | ICD-10-CM

## 2018-07-19 DIAGNOSIS — E11.649 TYPE 2 DIABETES MELLITUS WITH HYPOGLYCEMIA WITHOUT COMA, WITH LONG-TERM CURRENT USE OF INSULIN (HCC): ICD-10-CM

## 2018-07-19 DIAGNOSIS — E55.9 VITAMIN D DEFICIENCY: Chronic | ICD-10-CM

## 2018-07-19 PROCEDURE — 85027 COMPLETE CBC AUTOMATED: CPT | Performed by: NURSE PRACTITIONER

## 2018-07-19 PROCEDURE — 99214 OFFICE O/P EST MOD 30 MIN: CPT | Performed by: NURSE PRACTITIONER

## 2018-07-19 PROCEDURE — 80053 COMPREHEN METABOLIC PANEL: CPT | Performed by: NURSE PRACTITIONER

## 2018-07-19 PROCEDURE — 36415 COLL VENOUS BLD VENIPUNCTURE: CPT | Performed by: NURSE PRACTITIONER

## 2018-07-19 PROCEDURE — 83036 HEMOGLOBIN GLYCOSYLATED A1C: CPT | Performed by: NURSE PRACTITIONER

## 2018-07-19 RX ORDER — BUTALBITAL, ACETAMINOPHEN AND CAFFEINE 50; 325; 40 MG/1; MG/1; MG/1
1 TABLET ORAL EVERY 4 HOURS PRN
Qty: 30 TABLET | Refills: 0 | Status: SHIPPED | OUTPATIENT
Start: 2018-07-19 | End: 2021-01-01

## 2018-07-20 LAB
ALBUMIN SERPL-MCNC: 4.1 G/DL (ref 3.4–4.8)
ALBUMIN/GLOB SERPL: 1.2 G/DL (ref 1.1–1.8)
ALP SERPL-CCNC: 102 U/L (ref 38–126)
ALT SERPL W P-5'-P-CCNC: 29 U/L (ref 9–52)
ANION GAP SERPL CALCULATED.3IONS-SCNC: 9 MMOL/L (ref 5–15)
AST SERPL-CCNC: 26 U/L (ref 14–36)
BILIRUB SERPL-MCNC: 0.3 MG/DL (ref 0.2–1.3)
BUN BLD-MCNC: 20 MG/DL (ref 7–21)
BUN/CREAT SERPL: 26.7 (ref 7–25)
CALCIUM SPEC-SCNC: 9.7 MG/DL (ref 8.4–10.2)
CHLORIDE SERPL-SCNC: 100 MMOL/L (ref 95–110)
CO2 SERPL-SCNC: 29 MMOL/L (ref 22–31)
CREAT BLD-MCNC: 0.75 MG/DL (ref 0.5–1)
DEPRECATED RDW RBC AUTO: 42.3 FL (ref 36.4–46.3)
ERYTHROCYTE [DISTWIDTH] IN BLOOD BY AUTOMATED COUNT: 13.8 % (ref 11.5–14.5)
GFR SERPL CREATININE-BSD FRML MDRD: 75 ML/MIN/1.73 (ref 39–90)
GLOBULIN UR ELPH-MCNC: 3.4 GM/DL (ref 2.3–3.5)
GLUCOSE BLD-MCNC: 109 MG/DL (ref 60–100)
HBA1C MFR BLD: 6.8 % (ref 4–5.6)
HCT VFR BLD AUTO: 40 % (ref 35–45)
HGB BLD-MCNC: 13.1 G/DL (ref 12–15.5)
MCH RBC QN AUTO: 27.3 PG (ref 26.5–34)
MCHC RBC AUTO-ENTMCNC: 32.8 G/DL (ref 31.4–36)
MCV RBC AUTO: 83.3 FL (ref 80–98)
PLATELET # BLD AUTO: 210 10*3/MM3 (ref 150–450)
PMV BLD AUTO: 11.6 FL (ref 8–12)
POTASSIUM BLD-SCNC: 4.9 MMOL/L (ref 3.5–5.1)
PROT SERPL-MCNC: 7.5 G/DL (ref 6.3–8.6)
RBC # BLD AUTO: 4.8 10*6/MM3 (ref 3.77–5.16)
SODIUM BLD-SCNC: 138 MMOL/L (ref 137–145)
WBC NRBC COR # BLD: 8.84 10*3/MM3 (ref 3.2–9.8)

## 2018-07-23 ENCOUNTER — TELEPHONE (OUTPATIENT)
Dept: FAMILY MEDICINE CLINIC | Facility: CLINIC | Age: 78
End: 2018-07-23

## 2018-08-01 DIAGNOSIS — G89.29 CHRONIC NECK PAIN: Primary | ICD-10-CM

## 2018-08-01 DIAGNOSIS — Z79.4 TYPE 2 DIABETES MELLITUS WITH HYPOGLYCEMIA WITHOUT COMA, WITH LONG-TERM CURRENT USE OF INSULIN (HCC): Primary | ICD-10-CM

## 2018-08-01 DIAGNOSIS — E11.649 TYPE 2 DIABETES MELLITUS WITH HYPOGLYCEMIA WITHOUT COMA, WITH LONG-TERM CURRENT USE OF INSULIN (HCC): Primary | ICD-10-CM

## 2018-08-01 DIAGNOSIS — M54.2 CHRONIC NECK PAIN: Primary | ICD-10-CM

## 2018-08-01 RX ORDER — SF CHERRY 5.8 MG/1
1 LOZENGE ORAL ONCE
Qty: 1 EACH | Refills: 0 | Status: SHIPPED | OUTPATIENT
Start: 2018-08-01 | End: 2018-08-01

## 2018-08-01 RX ORDER — TIZANIDINE 4 MG/1
2-4 TABLET ORAL NIGHTLY PRN
Qty: 30 TABLET | Refills: 5 | Status: SHIPPED | OUTPATIENT
Start: 2018-08-01 | End: 2021-01-01 | Stop reason: SDUPTHER

## 2018-08-03 NOTE — PROGRESS NOTES
Subjective   Brenda Garzon is a 77 y.o. female.     She presents today for problems with headache, neck, and shoulder pain.  She suffers from significant arthritis, and is no longer able to take NSAIDs due to her kidney function.  Has been taking Tylenol arthritis with no relief of symptoms.  No recent injury or anything known that would cause the increase in pain other than stopping her NSAID therapy.  Her BP is well controlled today in the office.  FSBS has been well controlled on her current medication management.  No side effects from her medications used to manage her other chronic medical problems.      Arthritis   Presents for follow-up visit. She complains of pain and stiffness. She reports no joint swelling or joint warmth. The symptoms have been worsening. Affected locations include the neck, right shoulder and left shoulder. Her pain is at a severity of 4/10. Associated symptoms include fatigue, pain at night and pain while resting. Pertinent negatives include no diarrhea, dry eyes, dry mouth, dysuria, fever, rash, Raynaud's syndrome, uveitis or weight loss. Compliance with total regimen is %. Compliance problems include medication side effects.  Compliance with medications is %. Side effects of treatment include headaches.   Neck Pain    This is a recurrent problem. The current episode started more than 1 month ago. The problem occurs constantly. The problem has been waxing and waning. The quality of the pain is described as aching. The pain is at a severity of 4/10. The pain is moderate. The pain is same all the time. Stiffness is present all day. Associated symptoms include headaches. Pertinent negatives include no chest pain, fever, leg pain, numbness, pain with swallowing, paresis, photophobia, syncope, tingling, trouble swallowing, visual change, weakness or weight loss. She has tried acetaminophen for the symptoms. The treatment provided no relief.        The following portions of the  patient's history were reviewed and updated as appropriate: allergies, current medications, past family history, past medical history, past social history, past surgical history and problem list.    Review of Systems   Constitutional: Positive for fatigue. Negative for fever and unexpected weight loss.   HENT: Negative for trouble swallowing.    Eyes: Negative.  Negative for photophobia.   Respiratory: Negative.    Cardiovascular: Negative.  Negative for chest pain and syncope.   Gastrointestinal: Negative.  Negative for diarrhea.   Genitourinary: Negative for dysuria.   Musculoskeletal: Positive for arthralgias, arthritis, back pain, myalgias, neck pain, neck stiffness and stiffness. Negative for joint swelling.   Skin: Negative.  Negative for rash.   Allergic/Immunologic: Negative.    Neurological: Positive for headache. Negative for tingling, weakness and numbness.   Hematological: Negative.    Psychiatric/Behavioral: Negative.        Objective   Physical Exam   Constitutional: She is oriented to person, place, and time. Vital signs are normal. She appears well-developed and well-nourished. No distress.   HENT:   Head: Normocephalic.   Right Ear: External ear normal.   Left Ear: External ear normal.   Nose: Nose normal.   Mouth/Throat: Oropharynx is clear and moist. No oropharyngeal exudate.   Eyes: Pupils are equal, round, and reactive to light. Conjunctivae and EOM are normal. Right eye exhibits no discharge. Left eye exhibits no discharge.   Neck: Normal range of motion. Neck supple. No tracheal deviation present. No thyromegaly present.   Cardiovascular: Normal rate, regular rhythm and normal heart sounds.  Exam reveals no gallop and no friction rub.    No murmur heard.  Pulmonary/Chest: Effort normal and breath sounds normal. No respiratory distress. She has no wheezes. She has no rales. She exhibits no tenderness.   Musculoskeletal: Normal range of motion.        Right shoulder: She exhibits pain.         Left shoulder: She exhibits pain.        Cervical back: She exhibits pain.   Lymphadenopathy:     She has no cervical adenopathy.   Neurological: She is alert and oriented to person, place, and time.   Skin: Skin is warm and dry. Capillary refill takes less than 2 seconds. No rash noted. She is not diaphoretic. No erythema. No pallor.   Psychiatric: She has a normal mood and affect. Her behavior is normal. Judgment and thought content normal.   Nursing note and vitals reviewed.        Assessment/Plan   Brenda was seen today for arthritis, neck pain and headache.    Diagnoses and all orders for this visit:    Acute non intractable tension-type headache  -     butalbital-acetaminophen-caffeine (ESGIC) -40 MG per tablet; Take 1 tablet by mouth Every 4 (Four) Hours As Needed for Headache.    Type 2 diabetes mellitus with hypoglycemia without coma, with long-term current use of insulin (CMS/[facility])  -     CBC (No Diff)  -     Comprehensive Metabolic Panel  -     Hemoglobin A1c    Hypertension associated with diabetes (CMS/[facility])    Vitamin D deficiency    Dyslipidemia    Patient's Body mass index is 21.39 kg/m². BMI is within normal parameters. No follow-up required.    ROGERIO reviewed. #[id number]   Lab following office visit.  Fioricet q hrs PRN headache/neck pain.  Continue all other current medications.  Follow up as scheduled for routine follow up.  Follow up sooner for problems/concerns.  Patient verbalized understanding and agreement with plan of care.        This document has been electronically signed by CHEYENNE Lerma on August 3, 2018 8:16 AM

## 2018-08-24 ENCOUNTER — TELEPHONE (OUTPATIENT)
Dept: FAMILY MEDICINE CLINIC | Facility: CLINIC | Age: 78
End: 2018-08-24

## 2018-08-24 DIAGNOSIS — M85.89 OSTEOPENIA OF MULTIPLE SITES: Primary | ICD-10-CM

## 2018-08-24 NOTE — TELEPHONE ENCOUNTER
DXA scan shows osteopenia.  Referral to the bone health clinic was put in.        This document has been electronically signed by CHEYENNE Lerma on August 24, 2018 11:36 AM

## 2018-08-27 DIAGNOSIS — Z13.820 SCREENING FOR OSTEOPOROSIS: ICD-10-CM

## 2018-08-27 DIAGNOSIS — Z78.0 POSTMENOPAUSAL STATE: Chronic | ICD-10-CM

## 2018-08-27 DIAGNOSIS — Z12.31 ENCOUNTER FOR SCREENING MAMMOGRAM FOR BREAST CANCER: ICD-10-CM

## 2018-09-18 DIAGNOSIS — E11.8 TYPE 2 DIABETES MELLITUS WITH COMPLICATION, WITH LONG-TERM CURRENT USE OF INSULIN (HCC): Primary | ICD-10-CM

## 2018-09-18 DIAGNOSIS — Z79.4 TYPE 2 DIABETES MELLITUS WITH COMPLICATION, WITH LONG-TERM CURRENT USE OF INSULIN (HCC): Primary | ICD-10-CM

## 2018-09-18 RX ORDER — BLOOD SUGAR DIAGNOSTIC
STRIP MISCELLANEOUS
Qty: 200 EACH | Refills: 5 | Status: SHIPPED | OUTPATIENT
Start: 2018-09-18 | End: 2019-12-23 | Stop reason: SDUPTHER

## 2018-09-18 RX ORDER — LANCETS
EACH MISCELLANEOUS
Qty: 200 EACH | Refills: 5 | Status: SHIPPED | OUTPATIENT
Start: 2018-09-18 | End: 2021-01-01 | Stop reason: SDUPTHER

## 2018-10-02 DIAGNOSIS — I10 ESSENTIAL HYPERTENSION: Chronic | ICD-10-CM

## 2018-10-02 NOTE — TELEPHONE ENCOUNTER
Pt's daughter Jennifer Justin stated Pt is completely out of her B/P meds. Can you send some of both meds to [facility] and the rest to her mail order Pharmacy it takes two weeks for her to get it through the mail.

## 2018-10-03 RX ORDER — LOSARTAN POTASSIUM 50 MG/1
50 TABLET ORAL 2 TIMES DAILY
Qty: 180 TABLET | Refills: 1 | Status: SHIPPED | OUTPATIENT
Start: 2018-10-03 | End: 2019-01-30 | Stop reason: SDUPTHER

## 2018-10-05 ENCOUNTER — OFFICE VISIT (OUTPATIENT)
Dept: ENDOCRINOLOGY | Facility: CLINIC | Age: 78
End: 2018-10-05

## 2018-10-05 ENCOUNTER — FLU SHOT (OUTPATIENT)
Dept: FAMILY MEDICINE CLINIC | Facility: CLINIC | Age: 78
End: 2018-10-05

## 2018-10-05 VITALS
HEIGHT: 59 IN | HEART RATE: 70 BPM | BODY MASS INDEX: 20.96 KG/M2 | DIASTOLIC BLOOD PRESSURE: 66 MMHG | WEIGHT: 104 LBS | SYSTOLIC BLOOD PRESSURE: 112 MMHG

## 2018-10-05 DIAGNOSIS — E04.2 MULTINODULAR GOITER: ICD-10-CM

## 2018-10-05 DIAGNOSIS — E11.9 CONTROLLED TYPE 2 DIABETES MELLITUS WITHOUT COMPLICATION, WITH LONG-TERM CURRENT USE OF INSULIN (HCC): Primary | ICD-10-CM

## 2018-10-05 DIAGNOSIS — Z79.4 CONTROLLED TYPE 2 DIABETES MELLITUS WITHOUT COMPLICATION, WITH LONG-TERM CURRENT USE OF INSULIN (HCC): Primary | ICD-10-CM

## 2018-10-05 DIAGNOSIS — Z23 FLU VACCINE NEED: Primary | ICD-10-CM

## 2018-10-05 DIAGNOSIS — E55.9 VITAMIN D DEFICIENCY: ICD-10-CM

## 2018-10-05 PROCEDURE — 99214 OFFICE O/P EST MOD 30 MIN: CPT | Performed by: NURSE PRACTITIONER

## 2018-10-05 PROCEDURE — 90662 IIV NO PRSV INCREASED AG IM: CPT | Performed by: FAMILY MEDICINE

## 2018-10-05 PROCEDURE — G0008 ADMIN INFLUENZA VIRUS VAC: HCPCS | Performed by: FAMILY MEDICINE

## 2018-10-05 NOTE — PROGRESS NOTES
"  Subjective    Brenda Garzon is a 77 y.o. female. she is here today for follow-up.    History of Present Illness       Primary Care Provider     CHEYENNE Lerma     Duration since 1978     Timing - Diabetes is Constant     Quality -  Better control      Lab Results   Component Value Date    HGBA1C 6.8 (H) 07/19/2018                Severity -  severe     Complications - hypoglycemia in the past     Alleviating Factors: Compliance       Side Effects  none     Current diet  in general, a \"healthy\" diet       Current exercise walking     Current monitoring regimen: home blood tests - 3 times daily        Home blood sugar records:          This 96     Last night was 116     None under 80            Hypoglycemia none since last visit                   The following portions of the patient's history were reviewed and updated as appropriate:   Past Medical History:   Diagnosis Date   • Chest pain    • Essential hypertension    • Essential hypertension     unspecified   • GERD (gastroesophageal reflux disease)    • Hiatal hernia    • Hyperlipidemia    • Hypertensive disorder    • Irregular heartbeat    • Multinodular goiter    • Neck pain    • 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 Internal & external hemorrhoids found.   • LEG SURGERY Right    • PARTIAL HYSTERECTOMY     • UPPER GASTROINTESTINAL ENDOSCOPY  11/08/2013    Esophageal stricture present. Dilatation performed. Hiatus hernia in esophaugs. Gastritis in stomach. Biopsy taken. Normal duodenum. Biopsy taken.     Family History   Problem Relation Age of Onset   • Hypertension Mother    • " Stroke Mother    • Heart attack Father    • Heart disease Father    • Diabetes Sister    • Diabetes Brother      OB History     No data available        Current Outpatient Prescriptions   Medication Sig Dispense Refill   • ACCU-CHEK YUSRA PLUS test strip USE AS DIRECTED 200 each 5   • ACCU-CHEK FASTCLIX LANCETS misc USE AS DIRECTED 200 each 5   • acetaminophen (TYLENOL 8 HOUR ARTHRITIS PAIN) 650 MG 8 hr tablet Take 2 tablets by mouth Every 8 (Eight) Hours As Needed for Mild Pain . 120 tablet 5   • aspirin 81 MG tablet Take 81 mg by mouth daily.     • atorvastatin (LIPITOR) 20 MG tablet TAKE 1 TABLET BY MOUTH  EVERY NIGHT 90 tablet 1   • butalbital-acetaminophen-caffeine (ESGIC) -40 MG per tablet Take 1 tablet by mouth Every 4 (Four) Hours As Needed for Headache. 30 tablet 0   • Dulaglutide 1.5 MG/0.5ML solution pen-injector Inject 1.5 mg under the skin 1 (One) Time Per Week. 12 pen 11   • gabapentin (NEURONTIN) 300 MG capsule Take 1 capsule by mouth Every Night. 90 capsule 3   • Glucose 15 GM/32ML gel Take 32 mL by mouth 1 (One) Time As Needed (hypoglycemia). 96 mL 11   • Insulin Glargine (TOUJEO SOLOSTAR) 300 UNIT/ML solution pen-injector Inject 15 Units under the skin every night at bedtime. 3 pen 11   • losartan (COZAAR) 50 MG tablet Take 1 tablet by mouth 2 (Two) Times a Day. 180 tablet 1   • meclizine (ANTIVERT) 25 MG tablet Take 0.5-1 tablets by mouth 3 (Three) Times a Day As Needed for dizziness. 90 tablet 0   • metFORMIN ER (GLUCOPHAGE XR) 500 MG 24 hr tablet 1 with bkfast and 1 w supper 180 tablet 11   • raNITIdine (ZANTAC) 150 MG tablet Take 1 tablet by mouth 2 (Two) Times a Day. 180 tablet 1   • tiZANidine (ZANAFLEX) 4 MG tablet Take 0.5-1 tablets by mouth At Night As Needed for Muscle Spasms. 30 tablet 5   • verapamil SR (CALAN-SR) 120 MG CR tablet Take 1 tablet by mouth Every Night. 90 tablet 3   • vitamin D (ERGOCALCIFEROL) 64394 units capsule capsule Take 1 capsule by mouth Every 7 (Seven) Days.  "5 capsule 2     No current facility-administered medications for this visit.      Allergies   Allergen Reactions   • Codeine Other (See Comments)     Makes patient very sleepy      Social History     Social History   • Marital status:      Social History Main Topics   • Smoking status: Never Smoker   • Smokeless tobacco: Never Used   • Alcohol use No   • Drug use: No   • Sexual activity: Defer     Other Topics Concern   • Not on file       Review of Systems  Review of Systems   Constitutional: Negative for activity change, appetite change, diaphoresis and fatigue.   HENT: Negative for facial swelling, sneezing, sore throat, tinnitus, trouble swallowing and voice change.    Eyes: Negative for photophobia, pain, discharge, redness, itching and visual disturbance.   Respiratory: Negative for apnea, cough, choking, chest tightness and shortness of breath.    Cardiovascular: Negative for chest pain, palpitations and leg swelling.   Gastrointestinal: Negative for abdominal distention, abdominal pain, constipation, diarrhea, nausea and vomiting.   Endocrine: Negative for cold intolerance, heat intolerance, polydipsia, polyphagia and polyuria.   Genitourinary: Negative for difficulty urinating, dysuria, frequency, hematuria and urgency.   Musculoskeletal: Negative for arthralgias, back pain, gait problem, joint swelling, myalgias, neck pain and neck stiffness.   Skin: Negative for color change, pallor, rash and wound.   Neurological: Negative for dizziness, tremors, weakness, light-headedness, numbness and headaches.   Hematological: Negative for adenopathy. Does not bruise/bleed easily.   Psychiatric/Behavioral: Negative for behavioral problems, confusion and sleep disturbance.        Objective    /66 (BP Location: Left arm, Patient Position: Sitting, Cuff Size: Adult)   Pulse 70   Ht 149.9 cm (59\")   Wt 47.2 kg (104 lb)   LMP  (LMP Unknown)   BMI 21.01 kg/m²   Physical Exam   Constitutional: She is " oriented to person, place, and time. She appears well-developed and well-nourished. No distress.   HENT:   Head: Normocephalic and atraumatic.   Right Ear: External ear normal.   Left Ear: External ear normal.   Nose: Nose normal.   Eyes: Pupils are equal, round, and reactive to light. Conjunctivae and EOM are normal.   Neck: Normal range of motion. Neck supple. No tracheal deviation present. No thyromegaly present.   Cardiovascular: Normal rate, regular rhythm and normal heart sounds.    No murmur heard.  Pulmonary/Chest: Effort normal and breath sounds normal. No respiratory distress. She has no wheezes.   Abdominal: Soft. Bowel sounds are normal. There is no tenderness. There is no rebound and no guarding.   Musculoskeletal: Normal range of motion. She exhibits no edema, tenderness or deformity.   Neurological: She is alert and oriented to person, place, and time. No cranial nerve deficit.   Skin: Skin is warm and dry. No rash noted.   Psychiatric: She has a normal mood and affect. Her behavior is normal. Judgment and thought content normal.       Lab Review  Glucose (mg/dL)   Date Value   07/19/2018 109 (H)   04/17/2018 62   03/12/2018 117 (H)     Sodium (mmol/L)   Date Value   07/19/2018 138   04/17/2018 147 (H)   03/12/2018 142     Potassium (mmol/L)   Date Value   07/19/2018 4.9   04/17/2018 5.3 (H)   03/12/2018 4.7     Chloride (mmol/L)   Date Value   07/19/2018 100   04/17/2018 104   03/12/2018 101     CO2 (mmol/L)   Date Value   07/19/2018 29.0   04/17/2018 27.0   03/12/2018 25.0     BUN (mg/dL)   Date Value   07/19/2018 20   04/17/2018 23 (H)   03/12/2018 26 (H)     Creatinine (mg/dL)   Date Value   07/19/2018 0.75   04/17/2018 0.93   03/12/2018 0.90     Hemoglobin A1C (%)   Date Value   07/19/2018 6.8 (H)   04/17/2018 8.3 (H)   03/12/2018 8.5 (H)     Triglycerides   Date Value   04/17/2018 116 mg/dL   09/18/2017 124 mg/dL   09/12/2016 161 mg/dl   09/08/2014 141 mg/dl     LDL Cholesterol    Date Value    04/17/2018 111 mg/dL   09/18/2017 129 mg/dL   09/12/2016 126 mg/dl   09/08/2014 104 mg/dl       Assessment/Plan      1. Controlled type 2 diabetes mellitus without complication, with long-term current use of insulin (CMS/[facility])    2. Vitamin D deficiency    3. Multinodular goiter    .    Medications prescribed:  Outpatient Encounter Prescriptions as of 10/5/2018   Medication Sig Dispense Refill   • ACCU-CHEK YUSRA PLUS test strip USE AS DIRECTED 200 each 5   • ACCU-CHEK FASTCLIX LANCETS misc USE AS DIRECTED 200 each 5   • acetaminophen (TYLENOL 8 HOUR ARTHRITIS PAIN) 650 MG 8 hr tablet Take 2 tablets by mouth Every 8 (Eight) Hours As Needed for Mild Pain . 120 tablet 5   • aspirin 81 MG tablet Take 81 mg by mouth daily.     • atorvastatin (LIPITOR) 20 MG tablet TAKE 1 TABLET BY MOUTH  EVERY NIGHT 90 tablet 1   • butalbital-acetaminophen-caffeine (ESGIC) -40 MG per tablet Take 1 tablet by mouth Every 4 (Four) Hours As Needed for Headache. 30 tablet 0   • Dulaglutide 1.5 MG/0.5ML solution pen-injector Inject 1.5 mg under the skin 1 (One) Time Per Week. 12 pen 11   • gabapentin (NEURONTIN) 300 MG capsule Take 1 capsule by mouth Every Night. 90 capsule 3   • Glucose 15 GM/32ML gel Take 32 mL by mouth 1 (One) Time As Needed (hypoglycemia). 96 mL 11   • Insulin Glargine (TOUJEO SOLOSTAR) 300 UNIT/ML solution pen-injector Inject 15 Units under the skin every night at bedtime. 3 pen 11   • losartan (COZAAR) 50 MG tablet Take 1 tablet by mouth 2 (Two) Times a Day. 180 tablet 1   • meclizine (ANTIVERT) 25 MG tablet Take 0.5-1 tablets by mouth 3 (Three) Times a Day As Needed for dizziness. 90 tablet 0   • metFORMIN ER (GLUCOPHAGE XR) 500 MG 24 hr tablet 1 with bkfast and 1 w supper 180 tablet 11   • raNITIdine (ZANTAC) 150 MG tablet Take 1 tablet by mouth 2 (Two) Times a Day. 180 tablet 1   • tiZANidine (ZANAFLEX) 4 MG tablet Take 0.5-1 tablets by mouth At Night As Needed for Muscle Spasms. 30 tablet 5   • verapamil SR  (CALAN-SR) 120 MG CR tablet Take 1 tablet by mouth Every Night. 90 tablet 3   • vitamin D (ERGOCALCIFEROL) 21595 units capsule capsule Take 1 capsule by mouth Every 7 (Seven) Days. 5 capsule 2     No facility-administered encounter medications on file as of 10/5/2018.        Orders placed during this encounter include:  Orders Placed This Encounter   Procedures   • Comprehensive Metabolic Panel   • Hemoglobin A1c   • Vitamin D 25 Hydroxy   • Vitamin B12   • TSH   • CBC & Differential     Order Specific Question:   Manual Differential     Answer:   No     Glycemic Management:       Lab Results   Component Value Date    HGBA1C 6.8 (H) 07/19/2018                   Toujeo 15     trulicity  1.5 mg weekly      metformin 500 mg w breakfast and supper                  Lipid Management     On lipitor 20 mg qhs      Blood Pressure Management:      Was on losartan 50 , due to elevated K she was changed to norvasc        Norvasc 5 mg one daily            Microvascular Complication Monitoring:       Eye Exam Evaluation, within 1 year      -----------     Last Microalbumin-Proteinuria Assessment     GFR is 52  Microalbuminuria    Following with nephrology            -----------        Neuropathy, none            Bone Health               Lab Results   Component Value Date     HBPI29FS 40.7 04/17/2018     UNCG24XN 22.5 (L) 03/12/2018     [id number] 30.3 09/18/2017            Thyroid Health          Lab Results   Component Value Date    TSH 2.070 04/17/2018       h o MNG        Had US and told normal         Other Diabetes Related Aspects         No results found for: [id number]     Immunization:     She received the influenza vaccine today with the flu clinic     4. Follow-up: Return in about 3 months (around 1/5/2019) for Recheck.

## 2018-10-16 ENCOUNTER — OFFICE VISIT (OUTPATIENT)
Dept: FAMILY MEDICINE CLINIC | Facility: CLINIC | Age: 78
End: 2018-10-16

## 2018-10-16 VITALS
BODY MASS INDEX: 21.33 KG/M2 | HEIGHT: 59 IN | OXYGEN SATURATION: 99 % | HEART RATE: 73 BPM | RESPIRATION RATE: 20 BRPM | WEIGHT: 105.8 LBS | SYSTOLIC BLOOD PRESSURE: 160 MMHG | DIASTOLIC BLOOD PRESSURE: 90 MMHG | TEMPERATURE: 97.7 F

## 2018-10-16 DIAGNOSIS — I15.2 HYPERTENSION ASSOCIATED WITH DIABETES (HCC): ICD-10-CM

## 2018-10-16 DIAGNOSIS — K80.20 GALLSTONES: Primary | ICD-10-CM

## 2018-10-16 DIAGNOSIS — Z79.4 TYPE 2 DIABETES MELLITUS WITH HYPOGLYCEMIA WITHOUT COMA, WITH LONG-TERM CURRENT USE OF INSULIN (HCC): ICD-10-CM

## 2018-10-16 DIAGNOSIS — E78.5 DYSLIPIDEMIA: Chronic | ICD-10-CM

## 2018-10-16 DIAGNOSIS — E11.649 TYPE 2 DIABETES MELLITUS WITH HYPOGLYCEMIA WITHOUT COMA, WITH LONG-TERM CURRENT USE OF INSULIN (HCC): ICD-10-CM

## 2018-10-16 DIAGNOSIS — E11.59 HYPERTENSION ASSOCIATED WITH DIABETES (HCC): ICD-10-CM

## 2018-10-16 PROCEDURE — 99214 OFFICE O/P EST MOD 30 MIN: CPT | Performed by: NURSE PRACTITIONER

## 2018-10-23 ENCOUNTER — APPOINTMENT (OUTPATIENT)
Dept: LAB | Facility: HOSPITAL | Age: 78
End: 2018-10-23

## 2018-10-23 LAB
25(OH)D3 SERPL-MCNC: 32.2 NG/ML (ref 30–100)
ALBUMIN SERPL-MCNC: 3.8 G/DL (ref 3.4–4.8)
ALBUMIN/GLOB SERPL: 1.2 G/DL (ref 1.1–1.8)
ALP SERPL-CCNC: 112 U/L (ref 38–126)
ALT SERPL W P-5'-P-CCNC: 28 U/L (ref 9–52)
ANION GAP SERPL CALCULATED.3IONS-SCNC: 9 MMOL/L (ref 5–15)
AST SERPL-CCNC: 24 U/L (ref 14–36)
BASOPHILS # BLD AUTO: 0.03 10*3/MM3 (ref 0–0.2)
BASOPHILS NFR BLD AUTO: 0.3 % (ref 0–2)
BILIRUB SERPL-MCNC: 0.4 MG/DL (ref 0.2–1.3)
BUN BLD-MCNC: 27 MG/DL (ref 7–21)
BUN/CREAT SERPL: 28.1 (ref 7–25)
CALCIUM SPEC-SCNC: 9.6 MG/DL (ref 8.4–10.2)
CHLORIDE SERPL-SCNC: 103 MMOL/L (ref 95–110)
CO2 SERPL-SCNC: 26 MMOL/L (ref 22–31)
CREAT BLD-MCNC: 0.96 MG/DL (ref 0.5–1)
DEPRECATED RDW RBC AUTO: 44.7 FL (ref 36.4–46.3)
EOSINOPHIL # BLD AUTO: 0.16 10*3/MM3 (ref 0–0.7)
EOSINOPHIL NFR BLD AUTO: 1.8 % (ref 0–7)
ERYTHROCYTE [DISTWIDTH] IN BLOOD BY AUTOMATED COUNT: 14.8 % (ref 11.5–14.5)
GFR SERPL CREATININE-BSD FRML MDRD: 56 ML/MIN/1.73 (ref 39–90)
GLOBULIN UR ELPH-MCNC: 3.2 GM/DL (ref 2.3–3.5)
GLUCOSE BLD-MCNC: 114 MG/DL (ref 60–100)
HBA1C MFR BLD: 7 % (ref 4–5.6)
HCT VFR BLD AUTO: 38.7 % (ref 35–45)
HGB BLD-MCNC: 12.7 G/DL (ref 12–15.5)
IMM GRANULOCYTES # BLD: 0.01 10*3/MM3 (ref 0–0.02)
IMM GRANULOCYTES NFR BLD: 0.1 % (ref 0–0.5)
LYMPHOCYTES # BLD AUTO: 1.78 10*3/MM3 (ref 0.6–4.2)
LYMPHOCYTES NFR BLD AUTO: 20.6 % (ref 10–50)
MCH RBC QN AUTO: 27.3 PG (ref 26.5–34)
MCHC RBC AUTO-ENTMCNC: 32.8 G/DL (ref 31.4–36)
MCV RBC AUTO: 83 FL (ref 80–98)
MONOCYTES # BLD AUTO: 0.76 10*3/MM3 (ref 0–0.9)
MONOCYTES NFR BLD AUTO: 8.8 % (ref 0–12)
NEUTROPHILS # BLD AUTO: 5.91 10*3/MM3 (ref 2–8.6)
NEUTROPHILS NFR BLD AUTO: 68.4 % (ref 37–80)
PLATELET # BLD AUTO: 209 10*3/MM3 (ref 150–450)
PMV BLD AUTO: 12 FL (ref 8–12)
POTASSIUM BLD-SCNC: 4.9 MMOL/L (ref 3.5–5.1)
PROT SERPL-MCNC: 7 G/DL (ref 6.3–8.6)
RBC # BLD AUTO: 4.66 10*6/MM3 (ref 3.77–5.16)
SODIUM BLD-SCNC: 138 MMOL/L (ref 137–145)
TSH SERPL DL<=0.05 MIU/L-ACNC: 2.54 MIU/ML (ref 0.46–4.68)
VIT B12 BLD-MCNC: 549 PG/ML (ref 239–931)
WBC NRBC COR # BLD: 8.65 10*3/MM3 (ref 3.2–9.8)

## 2018-10-23 PROCEDURE — 80053 COMPREHEN METABOLIC PANEL: CPT | Performed by: NURSE PRACTITIONER

## 2018-10-23 PROCEDURE — 84443 ASSAY THYROID STIM HORMONE: CPT | Performed by: NURSE PRACTITIONER

## 2018-10-23 PROCEDURE — 85025 COMPLETE CBC W/AUTO DIFF WBC: CPT | Performed by: NURSE PRACTITIONER

## 2018-10-23 PROCEDURE — 83036 HEMOGLOBIN GLYCOSYLATED A1C: CPT | Performed by: NURSE PRACTITIONER

## 2018-10-23 PROCEDURE — 82607 VITAMIN B-12: CPT | Performed by: NURSE PRACTITIONER

## 2018-10-23 PROCEDURE — 82306 VITAMIN D 25 HYDROXY: CPT | Performed by: NURSE PRACTITIONER

## 2018-10-24 ENCOUNTER — TELEPHONE (OUTPATIENT)
Dept: ENDOCRINOLOGY | Facility: CLINIC | Age: 78
End: 2018-10-24

## 2018-10-24 NOTE — TELEPHONE ENCOUNTER
----- Message from CHEYENNE Maddox sent at 10/23/2018  2:56 PM CDT -----  A1c was 7 so at goal; thyroid, vitamin d  and b12 normal

## 2018-11-05 NOTE — PROGRESS NOTES
Subjective   Brenda Garzon is a 77 y.o. female.     She presents today for her routine follow up on chronic medical problems.  BP is well controlled.  Reports her blood sugars have been fairly well controlled.  No recent episodes of hypoglycemia.  Reports she has been doing well since her last visit with me, but she recently had an US completed with nephrology which indicated that she has gallstones.  She has been experiencing RUQ tenderness and would like a referral to general surgery to discuss having her gallbladder removed.  She has otherwise been doing well and is without any new complaints today in the office.       Hypertension   This is a chronic problem. The current episode started more than 1 year ago. The problem has been resolved since onset. The problem is controlled. Pertinent negatives include no anxiety, blurred vision, chest pain, headaches, malaise/fatigue, neck pain, orthopnea, palpitations, peripheral edema, PND, shortness of breath or sweats. There are no associated agents to hypertension. Risk factors for coronary artery disease include post-menopausal state, diabetes mellitus, dyslipidemia, family history, sedentary lifestyle and stress. Past treatments include angiotensin blockers. Current antihypertension treatment includes angiotensin blockers. The current treatment provides significant improvement. Compliance problems include diet and exercise.  Hypertensive end-organ damage includes kidney disease. Identifiable causes of hypertension include chronic renal disease.   Diabetes   She presents for her follow-up diabetic visit. She has type 2 diabetes mellitus. Her disease course has been stable. There are no hypoglycemic associated symptoms. Pertinent negatives for hypoglycemia include no headaches or sweats. There are no diabetic associated symptoms. Pertinent negatives for diabetes include no blurred vision, no chest pain and no weight loss. There are no hypoglycemic complications.  Symptoms are stable. There are no diabetic complications. Risk factors for coronary artery disease include diabetes mellitus, dyslipidemia, family history, post-menopausal, sedentary lifestyle and stress. Current diabetic treatment includes oral agent (triple therapy) and insulin injections. She is compliant with treatment most of the time. Her weight is stable. She is following a generally healthy diet. She has not had a previous visit with a dietitian. She rarely participates in exercise. An ACE inhibitor/angiotensin II receptor blocker is being taken. She does not see a podiatrist.Eye exam is not current.   Abdominal Pain   This is a new problem. The current episode started more than 1 month ago. The onset quality is gradual. The problem occurs intermittently. The problem has been waxing and waning. The pain is located in the RUQ. The quality of the pain is aching and colicky. The abdominal pain does not radiate. Associated symptoms include arthralgias. Pertinent negatives include no anorexia, belching, constipation, diarrhea, dysuria, fever, flatus, frequency, headaches, hematochezia, hematuria, melena, myalgias, nausea, vomiting or weight loss. The pain is aggravated by eating. The pain is relieved by nothing. She has tried nothing for the symptoms. The treatment provided no relief. Prior diagnostic workup includes ultrasound.        The following portions of the patient's history were reviewed and updated as appropriate: allergies, current medications, past family history, past medical history, past social history, past surgical history and problem list.    Review of Systems   Constitutional: Negative.  Negative for fever, malaise/fatigue and unexpected weight loss.   HENT: Negative.    Eyes: Negative.  Negative for blurred vision.   Respiratory: Negative.  Negative for shortness of breath.    Cardiovascular: Negative.  Negative for chest pain, palpitations, orthopnea and PND.   Gastrointestinal: Positive for  abdominal pain (RUQ). Negative for anorexia, constipation, diarrhea, flatus, hematochezia, melena, nausea and vomiting.   Genitourinary: Negative for dysuria, frequency and hematuria.   Musculoskeletal: Positive for arthralgias. Negative for myalgias and neck pain.   Skin: Negative.    Allergic/Immunologic: Negative.    Neurological: Negative.    Hematological: Negative.    Psychiatric/Behavioral: Negative.        Objective   Physical Exam   Constitutional: She is oriented to person, place, and time. Vital signs are normal. She appears well-developed and well-nourished. No distress.   HENT:   Head: Normocephalic.   Right Ear: External ear normal.   Left Ear: External ear normal.   Nose: Nose normal.   Mouth/Throat: Oropharynx is clear and moist. No oropharyngeal exudate.   Eyes: Pupils are equal, round, and reactive to light. Conjunctivae and EOM are normal. Right eye exhibits no discharge. Left eye exhibits no discharge.   Neck: Normal range of motion. Neck supple. No tracheal deviation present. No thyromegaly present.   Cardiovascular: Normal rate, regular rhythm and normal heart sounds.  Exam reveals no gallop and no friction rub.    No murmur heard.  Pulmonary/Chest: Effort normal and breath sounds normal. No respiratory distress. She has no wheezes. She has no rales. She exhibits no tenderness.   Abdominal: There is tenderness (RUQ tenderness).   Musculoskeletal: Normal range of motion.   Lymphadenopathy:     She has no cervical adenopathy.   Neurological: She is alert and oriented to person, place, and time.   Skin: Skin is warm and dry. Capillary refill takes less than 2 seconds. No rash noted. She is not diaphoretic. No erythema. No pallor.   Psychiatric: She has a normal mood and affect. Her behavior is normal. Judgment and thought content normal.   Nursing note and vitals reviewed.        Assessment/Plan   Brenda was seen today for follow-up, hypertension and diabetes.    Diagnoses and all orders for this  visit:    Gallstones  -     Ambulatory Referral to General Surgery    Type 2 diabetes mellitus with hypoglycemia without coma, with long-term current use of insulin (CMS/HCC)    Hypertension associated with diabetes (CMS/[facility])    Dyslipidemia    Patient's Body mass index is 21.37 kg/m². BMI is within normal parameters. No follow-up required.  Referral to general surgery for gallstones.  Continue current medications.  Follow up in 3 months for routine follow up.  Follow up sooner for problems/concerns.  Patient verbalized understanding and agreement with plan of care.        This document has been electronically signed by CHEYENNE Lerma on November 4, 2018 8:56 PM

## 2018-11-19 DIAGNOSIS — R12 HEARTBURN: ICD-10-CM

## 2018-11-20 RX ORDER — RANITIDINE 150 MG/1
TABLET ORAL
Qty: 180 TABLET | Refills: 1 | Status: SHIPPED | OUTPATIENT
Start: 2018-11-20 | End: 2020-01-30 | Stop reason: ALTCHOICE

## 2018-12-07 RX ORDER — ATORVASTATIN CALCIUM 20 MG/1
TABLET, FILM COATED ORAL
Qty: 90 TABLET | Refills: 1 | Status: SHIPPED | OUTPATIENT
Start: 2018-12-07 | End: 2019-03-20 | Stop reason: SDUPTHER

## 2019-01-30 ENCOUNTER — OFFICE VISIT (OUTPATIENT)
Dept: FAMILY MEDICINE CLINIC | Facility: CLINIC | Age: 79
End: 2019-01-30

## 2019-01-30 VITALS
HEART RATE: 90 BPM | WEIGHT: 103.6 LBS | TEMPERATURE: 97.6 F | BODY MASS INDEX: 20.88 KG/M2 | RESPIRATION RATE: 20 BRPM | SYSTOLIC BLOOD PRESSURE: 140 MMHG | DIASTOLIC BLOOD PRESSURE: 80 MMHG | HEIGHT: 59 IN | OXYGEN SATURATION: 98 %

## 2019-01-30 DIAGNOSIS — E11.649 TYPE 2 DIABETES MELLITUS WITH HYPOGLYCEMIA WITHOUT COMA, WITH LONG-TERM CURRENT USE OF INSULIN (HCC): ICD-10-CM

## 2019-01-30 DIAGNOSIS — I10 ESSENTIAL HYPERTENSION: Chronic | ICD-10-CM

## 2019-01-30 DIAGNOSIS — E78.2 MIXED DIABETIC HYPERLIPIDEMIA ASSOCIATED WITH TYPE 2 DIABETES MELLITUS (HCC): Primary | ICD-10-CM

## 2019-01-30 DIAGNOSIS — Z79.4 TYPE 2 DIABETES MELLITUS WITH HYPOGLYCEMIA WITHOUT COMA, WITH LONG-TERM CURRENT USE OF INSULIN (HCC): ICD-10-CM

## 2019-01-30 DIAGNOSIS — E11.69 MIXED DIABETIC HYPERLIPIDEMIA ASSOCIATED WITH TYPE 2 DIABETES MELLITUS (HCC): Primary | ICD-10-CM

## 2019-01-30 DIAGNOSIS — E78.5 DYSLIPIDEMIA: Chronic | ICD-10-CM

## 2019-01-30 PROCEDURE — 99214 OFFICE O/P EST MOD 30 MIN: CPT | Performed by: NURSE PRACTITIONER

## 2019-01-30 RX ORDER — LOSARTAN POTASSIUM 50 MG/1
50 TABLET ORAL 2 TIMES DAILY
Qty: 180 TABLET | Refills: 1 | Status: SHIPPED | OUTPATIENT
Start: 2019-01-30 | End: 2019-12-23 | Stop reason: SDUPTHER

## 2019-02-11 NOTE — PROGRESS NOTES
Subjective   Brenda Garzon is a 78 y.o. female.     She presents today for her routine follow up on chronic medical problems.  BP is well controlled.  Reports her blood sugars have been fairly well controlled.  No recent episodes of hypoglycemia.  She is doing well on her statin therapy for cholesterol management without side effects.  She reports that her arthritis symptoms have been fairly well controlled on the Tylenol Arthritis that she is currntly taking.  She is without any new complaints today in the office.      Hypertension   This is a chronic problem. The current episode started more than 1 year ago. The problem has been resolved since onset. The problem is controlled. Associated symptoms include neck pain. Pertinent negatives include no anxiety, blurred vision, chest pain, headaches, malaise/fatigue, orthopnea, palpitations, peripheral edema, PND, shortness of breath or sweats. There are no associated agents to hypertension. Risk factors for coronary artery disease include post-menopausal state, diabetes mellitus, dyslipidemia, family history, sedentary lifestyle and stress. Past treatments include angiotensin blockers. Current antihypertension treatment includes angiotensin blockers. The current treatment provides significant improvement. Compliance problems include diet and exercise.  Hypertensive end-organ damage includes kidney disease. Identifiable causes of hypertension include chronic renal disease.   Diabetes   She presents for her follow-up diabetic visit. She has type 2 diabetes mellitus. Her disease course has been stable. There are no hypoglycemic associated symptoms. Pertinent negatives for hypoglycemia include no headaches or sweats. Associated symptoms include fatigue. Pertinent negatives for diabetes include no blurred vision, no chest pain, no weakness and no weight loss. There are no hypoglycemic complications. Symptoms are stable. There are no diabetic complications. Risk factors for  coronary artery disease include diabetes mellitus, dyslipidemia, family history, post-menopausal, sedentary lifestyle and stress. Current diabetic treatment includes oral agent (triple therapy) and insulin injections. She is compliant with treatment most of the time. Her weight is stable. She is following a generally healthy diet. She has not had a previous visit with a dietitian. She rarely participates in exercise. An ACE inhibitor/angiotensin II receptor blocker is being taken. She does not see a podiatrist.Eye exam is not current.   Arthritis   Presents for follow-up visit. She complains of pain, stiffness, joint swelling and joint warmth. The symptoms have been stable. Associated symptoms include fatigue, pain at night and pain while resting. Pertinent negatives include no diarrhea, dry eyes, dysuria, fever, rash, Raynaud's syndrome, uveitis or weight loss. Compliance with total regimen is %. Compliance with medications is %.        The following portions of the patient's history were reviewed and updated as appropriate: allergies, current medications, past family history, past medical history, past social history, past surgical history and problem list.    Review of Systems   Constitutional: Positive for fatigue. Negative for fever, malaise/fatigue and unexpected weight loss.   HENT: Negative for trouble swallowing.    Eyes: Negative.  Negative for blurred vision and photophobia.   Respiratory: Negative.  Negative for shortness of breath.    Cardiovascular: Negative.  Negative for chest pain, palpitations, orthopnea and PND.   Gastrointestinal: Negative.  Negative for anorexia, constipation, diarrhea, flatus, hematochezia, melena, nausea and vomiting. Abdominal pain: RUQ.   Genitourinary: Negative for dysuria, frequency and hematuria.   Musculoskeletal: Positive for arthralgias, arthritis, back pain, joint swelling, myalgias, neck pain, neck stiffness and stiffness.   Skin: Negative.  Negative for  rash.   Allergic/Immunologic: Negative.    Neurological: Negative for weakness, numbness and headache.   Hematological: Negative.    Psychiatric/Behavioral: Negative.        Objective   Physical Exam   Constitutional: She is oriented to person, place, and time. Vital signs are normal. She appears well-developed and well-nourished. No distress.   HENT:   Head: Normocephalic.   Right Ear: External ear normal.   Left Ear: External ear normal.   Nose: Nose normal.   Mouth/Throat: Oropharynx is clear and moist. No oropharyngeal exudate.   Eyes: Conjunctivae and EOM are normal. Pupils are equal, round, and reactive to light. Right eye exhibits no discharge. Left eye exhibits no discharge.   Neck: Normal range of motion. Neck supple. No tracheal deviation present. No thyromegaly present.   Cardiovascular: Normal rate, regular rhythm and normal heart sounds. Exam reveals no gallop and no friction rub.   No murmur heard.  Pulmonary/Chest: Effort normal and breath sounds normal. No respiratory distress. She has no wheezes. She has no rales. She exhibits no tenderness.   Musculoskeletal: Normal range of motion.   Lymphadenopathy:     She has no cervical adenopathy.   Neurological: She is alert and oriented to person, place, and time.   Skin: Skin is warm and dry. Capillary refill takes less than 2 seconds. No rash noted. She is not diaphoretic. No erythema. No pallor.   Psychiatric: She has a normal mood and affect. Her behavior is normal. Judgment and thought content normal.   Nursing note and vitals reviewed.        Assessment/Plan   Brenda was seen today for follow-up and diabetes.    Diagnoses and all orders for this visit:    Mixed diabetic hyperlipidemia associated with type 2 diabetes mellitus (CMS/[facility])    Essential hypertension  -     losartan (COZAAR) 50 MG tablet; Take 1 tablet by mouth 2 (Two) Times a Day.    Type 2 diabetes mellitus with hypoglycemia without coma, with long-term current use of insulin  (CMS/HCC)    Dyslipidemia    Patient's Body mass index is 20.92 kg/m². BMI is within normal parameters. No follow-up required..  Continue current medications.  Follow up in 3 months for routine follow up.  Follow up sooner for problems/concerns.  Patient verbalized understanding and agreement with plan of care.        This document has been electronically signed by CHEYENNE Lerma on February 10, 2019 9:37 PM

## 2019-03-01 DIAGNOSIS — E11.65 UNCONTROLLED TYPE 2 DIABETES MELLITUS WITH HYPERGLYCEMIA (HCC): Primary | Chronic | ICD-10-CM

## 2019-03-01 RX ORDER — PEN NEEDLE, DIABETIC 30 GX3/16"
1 NEEDLE, DISPOSABLE MISCELLANEOUS DAILY
Qty: 200 EACH | Refills: 4 | Status: SHIPPED | OUTPATIENT
Start: 2019-03-01 | End: 2021-01-01 | Stop reason: SDUPTHER

## 2019-03-20 ENCOUNTER — TELEPHONE (OUTPATIENT)
Dept: FAMILY MEDICINE CLINIC | Facility: CLINIC | Age: 79
End: 2019-03-20

## 2019-03-20 DIAGNOSIS — E11.649 TYPE 2 DIABETES MELLITUS WITH HYPOGLYCEMIA WITHOUT COMA, WITH LONG-TERM CURRENT USE OF INSULIN (HCC): ICD-10-CM

## 2019-03-20 DIAGNOSIS — I10 ESSENTIAL HYPERTENSION: Chronic | ICD-10-CM

## 2019-03-20 DIAGNOSIS — Z79.4 TYPE 2 DIABETES MELLITUS WITH HYPOGLYCEMIA WITHOUT COMA, WITH LONG-TERM CURRENT USE OF INSULIN (HCC): ICD-10-CM

## 2019-03-20 RX ORDER — ATORVASTATIN CALCIUM 20 MG/1
20 TABLET, FILM COATED ORAL NIGHTLY
Qty: 90 TABLET | Refills: 1 | Status: SHIPPED | OUTPATIENT
Start: 2019-03-20 | End: 2019-11-13 | Stop reason: SDUPTHER

## 2019-03-29 ENCOUNTER — OFFICE VISIT (OUTPATIENT)
Dept: ENDOCRINOLOGY | Facility: CLINIC | Age: 79
End: 2019-03-29

## 2019-03-29 ENCOUNTER — APPOINTMENT (OUTPATIENT)
Dept: LAB | Facility: HOSPITAL | Age: 79
End: 2019-03-29

## 2019-03-29 VITALS
DIASTOLIC BLOOD PRESSURE: 60 MMHG | WEIGHT: 101 LBS | HEIGHT: 59 IN | HEART RATE: 81 BPM | BODY MASS INDEX: 20.36 KG/M2 | SYSTOLIC BLOOD PRESSURE: 118 MMHG

## 2019-03-29 DIAGNOSIS — E55.9 VITAMIN D DEFICIENCY: ICD-10-CM

## 2019-03-29 DIAGNOSIS — I10 ESSENTIAL HYPERTENSION: Primary | ICD-10-CM

## 2019-03-29 DIAGNOSIS — E78.2 MIXED HYPERLIPIDEMIA: ICD-10-CM

## 2019-03-29 DIAGNOSIS — E11.649 TYPE 2 DIABETES MELLITUS WITH HYPOGLYCEMIA WITHOUT COMA, WITH LONG-TERM CURRENT USE OF INSULIN (HCC): ICD-10-CM

## 2019-03-29 DIAGNOSIS — Z79.4 TYPE 2 DIABETES MELLITUS WITH HYPOGLYCEMIA WITHOUT COMA, WITH LONG-TERM CURRENT USE OF INSULIN (HCC): ICD-10-CM

## 2019-03-29 LAB
25(OH)D3 SERPL-MCNC: 21.1 NG/ML (ref 30–100)
ALBUMIN SERPL-MCNC: 4.6 G/DL (ref 3.5–5.2)
ALBUMIN/GLOB SERPL: 1.2 G/DL
ALP SERPL-CCNC: 102 U/L (ref 39–117)
ALT SERPL W P-5'-P-CCNC: 18 U/L (ref 1–33)
ANION GAP SERPL CALCULATED.3IONS-SCNC: 13.1 MMOL/L
AST SERPL-CCNC: 16 U/L (ref 1–32)
BASOPHILS # BLD AUTO: 0.05 10*3/MM3 (ref 0–0.2)
BASOPHILS NFR BLD AUTO: 0.7 % (ref 0–1.5)
BILIRUB SERPL-MCNC: 0.4 MG/DL (ref 0.2–1.2)
BUN BLD-MCNC: 20 MG/DL (ref 8–23)
BUN/CREAT SERPL: 21.7 (ref 7–25)
CALCIUM SPEC-SCNC: 10.1 MG/DL (ref 8.6–10.5)
CHLORIDE SERPL-SCNC: 100 MMOL/L (ref 98–107)
CO2 SERPL-SCNC: 26.9 MMOL/L (ref 22–29)
CREAT BLD-MCNC: 0.92 MG/DL (ref 0.57–1)
DEPRECATED RDW RBC AUTO: 42.1 FL (ref 37–54)
EOSINOPHIL # BLD AUTO: 0.16 10*3/MM3 (ref 0–0.4)
EOSINOPHIL NFR BLD AUTO: 2.2 % (ref 0.3–6.2)
ERYTHROCYTE [DISTWIDTH] IN BLOOD BY AUTOMATED COUNT: 13.9 % (ref 12.3–15.4)
GFR SERPL CREATININE-BSD FRML MDRD: 59 ML/MIN/1.73
GLOBULIN UR ELPH-MCNC: 3.7 GM/DL
GLUCOSE BLD-MCNC: 81 MG/DL (ref 65–99)
HBA1C MFR BLD: 7.25 % (ref 4.8–5.6)
HCT VFR BLD AUTO: 38.3 % (ref 34–46.6)
HGB BLD-MCNC: 12.4 G/DL (ref 12–15.9)
IMM GRANULOCYTES # BLD AUTO: 0.02 10*3/MM3 (ref 0–0.05)
IMM GRANULOCYTES NFR BLD AUTO: 0.3 % (ref 0–0.5)
LYMPHOCYTES # BLD AUTO: 1.89 10*3/MM3 (ref 0.7–3.1)
LYMPHOCYTES NFR BLD AUTO: 26.4 % (ref 19.6–45.3)
MCH RBC QN AUTO: 26.8 PG (ref 26.6–33)
MCHC RBC AUTO-ENTMCNC: 32.4 G/DL (ref 31.5–35.7)
MCV RBC AUTO: 82.9 FL (ref 79–97)
MONOCYTES # BLD AUTO: 0.66 10*3/MM3 (ref 0.1–0.9)
MONOCYTES NFR BLD AUTO: 9.2 % (ref 5–12)
NEUTROPHILS # BLD AUTO: 4.38 10*3/MM3 (ref 1.4–7)
NEUTROPHILS NFR BLD AUTO: 61.2 % (ref 42.7–76)
NRBC BLD AUTO-RTO: 0 /100 WBC (ref 0–0)
PLATELET # BLD AUTO: 238 10*3/MM3 (ref 140–450)
PMV BLD AUTO: 13.5 FL (ref 6–12)
POTASSIUM BLD-SCNC: 4.5 MMOL/L (ref 3.5–5.2)
PROT SERPL-MCNC: 8.3 G/DL (ref 6–8.5)
RBC # BLD AUTO: 4.62 10*6/MM3 (ref 3.77–5.28)
SODIUM BLD-SCNC: 140 MMOL/L (ref 136–145)
TSH SERPL DL<=0.05 MIU/L-ACNC: 1.61 MIU/ML (ref 0.27–4.2)
VIT B12 BLD-MCNC: 531 PG/ML (ref 211–946)
WBC NRBC COR # BLD: 7.16 10*3/MM3 (ref 3.4–10.8)

## 2019-03-29 PROCEDURE — 85025 COMPLETE CBC W/AUTO DIFF WBC: CPT | Performed by: NURSE PRACTITIONER

## 2019-03-29 PROCEDURE — 99214 OFFICE O/P EST MOD 30 MIN: CPT | Performed by: NURSE PRACTITIONER

## 2019-03-29 PROCEDURE — 82607 VITAMIN B-12: CPT | Performed by: NURSE PRACTITIONER

## 2019-03-29 PROCEDURE — 84443 ASSAY THYROID STIM HORMONE: CPT | Performed by: NURSE PRACTITIONER

## 2019-03-29 PROCEDURE — 80053 COMPREHEN METABOLIC PANEL: CPT | Performed by: NURSE PRACTITIONER

## 2019-03-29 PROCEDURE — 83036 HEMOGLOBIN GLYCOSYLATED A1C: CPT | Performed by: NURSE PRACTITIONER

## 2019-03-29 PROCEDURE — 36415 COLL VENOUS BLD VENIPUNCTURE: CPT | Performed by: NURSE PRACTITIONER

## 2019-03-29 PROCEDURE — 82306 VITAMIN D 25 HYDROXY: CPT | Performed by: NURSE PRACTITIONER

## 2019-03-29 NOTE — PROGRESS NOTES
"  Subjective    Brenda Garzon is a 78 y.o. female. she is here today for follow-up.    History of Present Illness      Primary Care Provider     CHEYENNE Lerma     Duration since 1978     Timing - Diabetes is Constant     Quality -  Better control       Lab Results   Component Value Date    HGBA1C 7.0 (H) 10/23/2018               Severity -  severe     Complications - hypoglycemia in the past     Alleviating Factors: Compliance       Side Effects  none     Current diet  in general, a \"healthy\" diet       Current exercise walking     Current monitoring regimen: home blood tests - 3 times daily        Home blood sugar records:            This 96      Last night was 116      None under 80            Hypoglycemia none since last visit                     The following portions of the patient's history were reviewed and updated as appropriate:   Past Medical History:   Diagnosis Date   • Chest pain    • Essential hypertension    • Essential hypertension     unspecified   • GERD (gastroesophageal reflux disease)    • Hiatal hernia    • Hyperlipidemia    • Hypertensive disorder    • Irregular heartbeat    • Multinodular goiter    • Neck pain    • 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 Internal & external hemorrhoids found.   • LEG SURGERY Right    • PARTIAL HYSTERECTOMY     • UPPER GASTROINTESTINAL ENDOSCOPY  11/08/2013    Esophageal stricture present. Dilatation performed. Hiatus hernia in esophaugs. Gastritis in stomach. Biopsy taken. Normal duodenum. Biopsy taken.     Family History   Problem Relation Age of Onset   • Hypertension Mother  "   • Stroke Mother    • Heart attack Father    • Heart disease Father    • Diabetes Sister    • Diabetes Brother      OB History     No data available        Current Outpatient Medications   Medication Sig Dispense Refill   • ACCU-CHEK YUSRA PLUS test strip USE AS DIRECTED 200 each 5   • ACCU-CHEK FASTCLIX LANCETS misc USE AS DIRECTED 200 each 5   • acetaminophen (TYLENOL 8 HOUR ARTHRITIS PAIN) 650 MG 8 hr tablet Take 2 tablets by mouth Every 8 (Eight) Hours As Needed for Mild Pain . 120 tablet 5   • aspirin 81 MG tablet Take 81 mg by mouth daily.     • atorvastatin (LIPITOR) 20 MG tablet Take 1 tablet by mouth Every Night. 90 tablet 1   • butalbital-acetaminophen-caffeine (ESGIC) -40 MG per tablet Take 1 tablet by mouth Every 4 (Four) Hours As Needed for Headache. 30 tablet 0   • Dulaglutide 1.5 MG/0.5ML solution pen-injector Inject 1.5 mg under the skin into the appropriate area as directed 1 (One) Time Per Week. 12 pen 11   • gabapentin (NEURONTIN) 300 MG capsule Take 1 capsule by mouth Every Night. 90 capsule 3   • Glucose 15 GM/32ML gel Take 32 mL by mouth 1 (One) Time As Needed (hypoglycemia). 96 mL 11   • Insulin Glargine (TOUJEO SOLOSTAR) 300 UNIT/ML solution pen-injector Inject 15 Units under the skin into the appropriate area as directed every night at bedtime. 3 pen 11   • Insulin Pen Needle (PEN NEEDLES) 32G X 4 MM misc 1 each Daily. 200 each 4   • losartan (COZAAR) 50 MG tablet Take 1 tablet by mouth 2 (Two) Times a Day. 180 tablet 1   • meclizine (ANTIVERT) 25 MG tablet Take 0.5-1 tablets by mouth 3 (Three) Times a Day As Needed for dizziness. 90 tablet 0   • metFORMIN ER (GLUCOPHAGE XR) 500 MG 24 hr tablet 1 with bkfast and 1 w supper 180 tablet 11   • raNITIdine (ZANTAC) 150 MG tablet TAKE 1 TABLET BY MOUTH TWO  TIMES DAILY 180 tablet 1   • tiZANidine (ZANAFLEX) 4 MG tablet Take 0.5-1 tablets by mouth At Night As Needed for Muscle Spasms. 30 tablet 5   • verapamil SR (CALAN-SR) 120 MG CR tablet  Take 1 tablet by mouth Every Night. 90 tablet 3   • vitamin D (ERGOCALCIFEROL) 25288 units capsule capsule Take 1 capsule by mouth Every 7 (Seven) Days. 5 capsule 2     No current facility-administered medications for this visit.      Allergies   Allergen Reactions   • Codeine Other (See Comments)     Makes patient very sleepy      Social History     Socioeconomic History   • Marital status:      Spouse name: Not on file   • Number of children: Not on file   • Years of education: Not on file   • Highest education level: Not on file   Tobacco Use   • Smoking status: Never Smoker   • Smokeless tobacco: Never Used   Substance and Sexual Activity   • Alcohol use: No   • Drug use: No   • Sexual activity: Defer       Review of Systems  Review of Systems   Constitutional: Negative for activity change, appetite change, diaphoresis and fatigue.   HENT: Negative for facial swelling, sneezing, sore throat, tinnitus, trouble swallowing and voice change.    Eyes: Negative for photophobia, pain, discharge, redness, itching and visual disturbance.   Respiratory: Negative for apnea, cough, choking, chest tightness and shortness of breath.    Cardiovascular: Negative for chest pain, palpitations and leg swelling.   Gastrointestinal: Negative for abdominal distention, abdominal pain, constipation, diarrhea, nausea and vomiting.   Endocrine: Negative for cold intolerance, heat intolerance, polydipsia, polyphagia and polyuria.   Genitourinary: Negative for difficulty urinating, dysuria, frequency, hematuria and urgency.   Musculoskeletal: Negative for arthralgias, back pain, gait problem, joint swelling, myalgias, neck pain and neck stiffness.   Skin: Negative for color change, pallor, rash and wound.   Neurological: Negative for dizziness, tremors, weakness, light-headedness, numbness and headaches.   Hematological: Negative for adenopathy. Does not bruise/bleed easily.   Psychiatric/Behavioral: Negative for behavioral  "problems, confusion and sleep disturbance.        Objective    /60 (BP Location: Right arm, Patient Position: Sitting, Cuff Size: Adult)   Pulse 81   Ht 149.9 cm (59\")   Wt 45.8 kg (101 lb)   LMP  (LMP Unknown)   BMI 20.40 kg/m²   Physical Exam   Constitutional: She is oriented to person, place, and time. She appears well-developed and well-nourished. No distress.   HENT:   Head: Normocephalic and atraumatic.   Right Ear: External ear normal.   Left Ear: External ear normal.   Nose: Nose normal.   Eyes: Conjunctivae and EOM are normal. Pupils are equal, round, and reactive to light.   Neck: Normal range of motion. Neck supple. No tracheal deviation present. No thyromegaly present.   Cardiovascular: Normal rate, regular rhythm and normal heart sounds.   No murmur heard.  Pulmonary/Chest: Effort normal and breath sounds normal. No respiratory distress. She has no wheezes.   Abdominal: Soft. Bowel sounds are normal. There is no tenderness. There is no rebound and no guarding.   Musculoskeletal: Normal range of motion. She exhibits no edema, tenderness or deformity.   Neurological: She is alert and oriented to person, place, and time. No cranial nerve deficit.   Skin: Skin is warm and dry. No rash noted.   Psychiatric: She has a normal mood and affect. Her behavior is normal. Judgment and thought content normal.       Lab Review  Glucose (mg/dL)   Date Value   10/23/2018 114 (H)   07/19/2018 109 (H)   04/17/2018 62     Sodium (mmol/L)   Date Value   10/23/2018 138   07/19/2018 138   04/17/2018 147 (H)     Potassium (mmol/L)   Date Value   10/23/2018 4.9   07/19/2018 4.9   04/17/2018 5.3 (H)     Chloride (mmol/L)   Date Value   10/23/2018 103   07/19/2018 100   04/17/2018 104     CO2 (mmol/L)   Date Value   10/23/2018 26.0   07/19/2018 29.0   04/17/2018 27.0     BUN (mg/dL)   Date Value   10/23/2018 27 (H)   07/19/2018 20   04/17/2018 23 (H)     Creatinine (mg/dL)   Date Value   10/23/2018 0.96   07/19/2018 " 0.75   04/17/2018 0.93     Hemoglobin A1C (%)   Date Value   10/23/2018 7.0 (H)   07/19/2018 6.8 (H)   04/17/2018 8.3 (H)     Triglycerides   Date Value   04/17/2018 116 mg/dL   09/18/2017 124 mg/dL   09/12/2016 161 mg/dl   09/08/2014 141 mg/dl     LDL Cholesterol    Date Value   04/17/2018 111 mg/dL   09/18/2017 129 mg/dL   09/12/2016 126 mg/dl   09/08/2014 104 mg/dl       Assessment/Plan      1. Essential hypertension    2. Mixed hyperlipidemia    3. Vitamin D deficiency    4. Type 2 diabetes mellitus with hypoglycemia without coma, with long-term current use of insulin (CMS/[facility])    .    Medications prescribed:  Outpatient Encounter Medications as of 3/29/2019   Medication Sig Dispense Refill   • ACCU-CHEK YUSRA PLUS test strip USE AS DIRECTED 200 each 5   • ACCU-CHEK FASTCLIX LANCETS misc USE AS DIRECTED 200 each 5   • acetaminophen (TYLENOL 8 HOUR ARTHRITIS PAIN) 650 MG 8 hr tablet Take 2 tablets by mouth Every 8 (Eight) Hours As Needed for Mild Pain . 120 tablet 5   • aspirin 81 MG tablet Take 81 mg by mouth daily.     • atorvastatin (LIPITOR) 20 MG tablet Take 1 tablet by mouth Every Night. 90 tablet 1   • butalbital-acetaminophen-caffeine (ESGIC) -40 MG per tablet Take 1 tablet by mouth Every 4 (Four) Hours As Needed for Headache. 30 tablet 0   • Dulaglutide 1.5 MG/0.5ML solution pen-injector Inject 1.5 mg under the skin into the appropriate area as directed 1 (One) Time Per Week. 12 pen 11   • gabapentin (NEURONTIN) 300 MG capsule Take 1 capsule by mouth Every Night. 90 capsule 3   • Glucose 15 GM/32ML gel Take 32 mL by mouth 1 (One) Time As Needed (hypoglycemia). 96 mL 11   • Insulin Glargine (TOUJEO SOLOSTAR) 300 UNIT/ML solution pen-injector Inject 15 Units under the skin into the appropriate area as directed every night at bedtime. 3 pen 11   • Insulin Pen Needle (PEN NEEDLES) 32G X 4 MM misc 1 each Daily. 200 each 4   • losartan (COZAAR) 50 MG tablet Take 1 tablet by mouth 2 (Two) Times a Day. 180  tablet 1   • meclizine (ANTIVERT) 25 MG tablet Take 0.5-1 tablets by mouth 3 (Three) Times a Day As Needed for dizziness. 90 tablet 0   • metFORMIN ER (GLUCOPHAGE XR) 500 MG 24 hr tablet 1 with bkfast and 1 w supper 180 tablet 11   • raNITIdine (ZANTAC) 150 MG tablet TAKE 1 TABLET BY MOUTH TWO  TIMES DAILY 180 tablet 1   • tiZANidine (ZANAFLEX) 4 MG tablet Take 0.5-1 tablets by mouth At Night As Needed for Muscle Spasms. 30 tablet 5   • verapamil SR (CALAN-SR) 120 MG CR tablet Take 1 tablet by mouth Every Night. 90 tablet 3   • vitamin D (ERGOCALCIFEROL) 89919 units capsule capsule Take 1 capsule by mouth Every 7 (Seven) Days. 5 capsule 2     No facility-administered encounter medications on file as of 3/29/2019.        Orders placed during this encounter include:  Orders Placed This Encounter   Procedures   • Comprehensive Metabolic Panel   • Hemoglobin A1c   • TSH   • Vitamin B12   • Vitamin D 25 Hydroxy   • CBC & Differential     Order Specific Question:   Manual Differential     Answer:   No   Glycemic Management:                 Lab Results   Component Value Date    HGBA1C 7.0 (H) 10/23/2018                Toujeo 15     trulicity  1.5 mg weekly      metformin 500 mg w breakfast and supper                  Lipid Management     On lipitor 20 mg qhs      Blood Pressure Management:      Was on losartan 50 , due to elevated K she was changed to norvasc        Norvasc 5 mg one daily            Microvascular Complication Monitoring:       Eye Exam Evaluation, within 1 year      -----------     Last Microalbumin-Proteinuria Assessment     GFR is 52  Microalbuminuria     Following with nephrology            -----------        Neuropathy, none            Bone Health               Lab Results   Component Value Date     FWNG16RZ 40.7 04/17/2018     KSMI28KZ 22.5 (L) 03/12/2018     OPTB88JV 30.3 09/18/2017            Thyroid Health     Lab Results   Component Value Date    TSH 2.540 10/23/2018               h o MNG         Had US and told normal         Other Diabetes Related Aspects         Lab Results   Component Value Date    FGLKIPDL90 549 10/23/2018          Immunization:      She received the influenza vaccine today with the flu clinic            4. Follow-up: Return in about 4 months (around 7/29/2019) for Recheck.

## 2019-04-02 ENCOUNTER — TELEPHONE (OUTPATIENT)
Dept: ENDOCRINOLOGY | Facility: CLINIC | Age: 79
End: 2019-04-02

## 2019-04-02 RX ORDER — ERGOCALCIFEROL 1.25 MG/1
50000 CAPSULE ORAL
Qty: 5 CAPSULE | Refills: 5 | Status: SHIPPED | OUTPATIENT
Start: 2019-04-02 | End: 2020-01-30 | Stop reason: SDUPTHER

## 2019-04-02 NOTE — TELEPHONE ENCOUNTER
----- Message from CHEYENNE Maddox sent at 4/1/2019  7:52 AM CDT -----  Vitamin d is low needs 50,000 units weekly; b12 normal , thyroid normal; A1c is 7.2 which is acceptable due to her history of low blood sugars;

## 2019-04-24 ENCOUNTER — OFFICE VISIT (OUTPATIENT)
Dept: FAMILY MEDICINE CLINIC | Facility: CLINIC | Age: 79
End: 2019-04-24

## 2019-04-24 VITALS
BODY MASS INDEX: 20.18 KG/M2 | WEIGHT: 100.1 LBS | RESPIRATION RATE: 20 BRPM | TEMPERATURE: 98.3 F | HEART RATE: 85 BPM | SYSTOLIC BLOOD PRESSURE: 130 MMHG | OXYGEN SATURATION: 98 % | HEIGHT: 59 IN | DIASTOLIC BLOOD PRESSURE: 80 MMHG

## 2019-04-24 DIAGNOSIS — R11.0 CHRONIC NAUSEA: ICD-10-CM

## 2019-04-24 DIAGNOSIS — R12 HEARTBURN: Primary | ICD-10-CM

## 2019-04-24 PROCEDURE — 99214 OFFICE O/P EST MOD 30 MIN: CPT | Performed by: NURSE PRACTITIONER

## 2019-04-24 RX ORDER — SUCRALFATE 1 G/1
1 TABLET ORAL
Qty: 120 TABLET | Refills: 2 | Status: SHIPPED | OUTPATIENT
Start: 2019-04-24 | End: 2020-12-08 | Stop reason: SDUPTHER

## 2019-05-09 NOTE — PROGRESS NOTES
Subjective   Brenda Garzon is a 78 y.o. female.     She presents today for problems with severe acid reflux symptoms.  She was previously on a PPI, but she has CKD and they recommend that she stop this.  She is currently taking Zantac, but this is not working well for her symptoms.  She has been experiencing chronic acid and nausea.  She has not found any medication to help with these symptoms.  She is interested in something new today in the office.  She reports that her diabetes has been well controlled.  She is otherwise without any other new complaints today in the office.       Heartburn   She complains of coughing, heartburn and nausea. She reports no abdominal pain, no belching, no chest pain, no choking, no dysphagia, no early satiety, no globus sensation, no hoarse voice, no sore throat, no stridor, no tooth decay, no water brash or no wheezing. This is a recurrent problem. The current episode started more than 1 month ago. The problem occurs constantly. The problem has been rapidly worsening. Pertinent negatives include no anemia, fatigue, melena, muscle weakness, orthopnea or weight loss. She has tried a histamine-2 antagonist for the symptoms. The treatment provided no relief.   Nausea   This is a recurrent problem. The current episode started more than 1 month ago. The problem occurs constantly. The problem has been rapidly worsening. Associated symptoms include coughing, nausea and vomiting. Pertinent negatives include no abdominal pain, anorexia, arthralgias, change in bowel habit, chest pain, chills, congestion, diaphoresis, fatigue, fever, headaches, joint swelling, myalgias, neck pain, numbness, rash, sore throat, swollen glands, urinary symptoms, vertigo, visual change or weakness. The treatment provided no relief.        The following portions of the patient's history were reviewed and updated as appropriate: allergies, current medications, past family history, past medical history, past social  history, past surgical history and problem list.    Review of Systems   Constitutional: Negative.  Negative for chills, diaphoresis, fatigue, fever and unexpected weight loss.   HENT: Negative.  Negative for congestion, hoarse voice and sore throat.    Eyes: Negative.    Respiratory: Positive for cough. Negative for choking and wheezing.    Cardiovascular: Negative.  Negative for chest pain.   Gastrointestinal: Positive for nausea, vomiting and indigestion. Negative for abdominal pain, anorexia, change in bowel habit, dysphagia and melena.   Musculoskeletal: Negative.  Negative for arthralgias, joint swelling, myalgias, muscle weakness and neck pain.   Skin: Negative.  Negative for rash.   Allergic/Immunologic: Negative.    Neurological: Negative.  Negative for vertigo, weakness and numbness.   Hematological: Negative.    Psychiatric/Behavioral: Negative.        Objective   Physical Exam   Constitutional: She is oriented to person, place, and time. Vital signs are normal. She appears well-developed and well-nourished. No distress.   HENT:   Head: Normocephalic.   Right Ear: External ear normal.   Left Ear: External ear normal.   Nose: Nose normal.   Mouth/Throat: Oropharynx is clear and moist. No oropharyngeal exudate.   Eyes: Conjunctivae and EOM are normal. Pupils are equal, round, and reactive to light. Right eye exhibits no discharge. Left eye exhibits no discharge.   Neck: Normal range of motion. Neck supple. No tracheal deviation present. No thyromegaly present.   Cardiovascular: Normal rate, regular rhythm and normal heart sounds. Exam reveals no gallop and no friction rub.   No murmur heard.  Pulmonary/Chest: Effort normal and breath sounds normal. No respiratory distress. She has no wheezes. She has no rales. She exhibits no tenderness.   Abdominal: Soft. Bowel sounds are normal. She exhibits no distension and no mass. There is no tenderness. There is no rebound and no guarding. No hernia.    Musculoskeletal: Normal range of motion.   Lymphadenopathy:     She has no cervical adenopathy.   Neurological: She is alert and oriented to person, place, and time.   Skin: Skin is warm and dry. Capillary refill takes less than 2 seconds. No rash noted. She is not diaphoretic. No erythema. No pallor.   Psychiatric: She has a normal mood and affect. Her behavior is normal. Judgment and thought content normal.   Nursing note and vitals reviewed.        Assessment/Plan   Brenda was seen today for vomiting and heartburn.    Diagnoses and all orders for this visit:    Heartburn  -     sucralfate (CARAFATE) 1 g tablet; Take 1 tablet by mouth 4 (Four) Times a Day Before Meals & at Bedtime As Needed (acid reflux).    Chronic nausea  -     sucralfate (CARAFATE) 1 g tablet; Take 1 tablet by mouth 4 (Four) Times a Day Before Meals & at Bedtime As Needed (acid reflux).               Patient's Body mass index is 20.22 kg/m². BMI is within normal parameters. No follow-up required..  Carafate before meals and at bedtime.  Continue all other current medications.  Follow-up if symptoms persist/fail to improve.  Follow up as scheduled for routine follow up.  Follow up sooner for problems/concerns.  Patient verbalized understanding and agreement with plan of care.        This document has been electronically signed by CHEYENNE Lerma on May 9, 2019 9:30 AM

## 2019-06-25 ENCOUNTER — OFFICE VISIT (OUTPATIENT)
Dept: FAMILY MEDICINE CLINIC | Facility: CLINIC | Age: 79
End: 2019-06-25

## 2019-06-25 VITALS
WEIGHT: 102.5 LBS | TEMPERATURE: 98.3 F | OXYGEN SATURATION: 98 % | RESPIRATION RATE: 20 BRPM | BODY MASS INDEX: 20.66 KG/M2 | DIASTOLIC BLOOD PRESSURE: 80 MMHG | HEART RATE: 62 BPM | HEIGHT: 59 IN | SYSTOLIC BLOOD PRESSURE: 122 MMHG

## 2019-06-25 DIAGNOSIS — R00.1 BRADYCARDIA: ICD-10-CM

## 2019-06-25 DIAGNOSIS — E11.65 UNCONTROLLED TYPE 2 DIABETES MELLITUS WITH HYPERGLYCEMIA (HCC): Chronic | ICD-10-CM

## 2019-06-25 DIAGNOSIS — I10 ESSENTIAL HYPERTENSION: Primary | Chronic | ICD-10-CM

## 2019-06-25 PROCEDURE — 93005 ELECTROCARDIOGRAM TRACING: CPT | Performed by: NURSE PRACTITIONER

## 2019-06-25 PROCEDURE — 99214 OFFICE O/P EST MOD 30 MIN: CPT | Performed by: NURSE PRACTITIONER

## 2019-06-25 PROCEDURE — 93010 ELECTROCARDIOGRAM REPORT: CPT | Performed by: INTERNAL MEDICINE

## 2019-06-28 ENCOUNTER — APPOINTMENT (OUTPATIENT)
Dept: LAB | Facility: HOSPITAL | Age: 79
End: 2019-06-28

## 2019-06-28 ENCOUNTER — OFFICE VISIT (OUTPATIENT)
Dept: FAMILY MEDICINE CLINIC | Facility: CLINIC | Age: 79
End: 2019-06-28

## 2019-06-28 VITALS
HEIGHT: 59 IN | SYSTOLIC BLOOD PRESSURE: 120 MMHG | DIASTOLIC BLOOD PRESSURE: 72 MMHG | TEMPERATURE: 98.2 F | HEART RATE: 72 BPM | OXYGEN SATURATION: 98 % | BODY MASS INDEX: 20.88 KG/M2 | WEIGHT: 103.6 LBS

## 2019-06-28 DIAGNOSIS — Z00.00 MEDICARE ANNUAL WELLNESS VISIT, INITIAL: Primary | ICD-10-CM

## 2019-06-28 PROCEDURE — 82570 ASSAY OF URINE CREATININE: CPT | Performed by: NURSE PRACTITIONER

## 2019-06-28 PROCEDURE — 83036 HEMOGLOBIN GLYCOSYLATED A1C: CPT | Performed by: NURSE PRACTITIONER

## 2019-06-28 PROCEDURE — 80061 LIPID PANEL: CPT | Performed by: NURSE PRACTITIONER

## 2019-06-28 PROCEDURE — 82043 UR ALBUMIN QUANTITATIVE: CPT | Performed by: NURSE PRACTITIONER

## 2019-06-28 PROCEDURE — G0438 PPPS, INITIAL VISIT: HCPCS | Performed by: NURSE PRACTITIONER

## 2019-06-29 LAB
ALBUMIN UR-MCNC: 3.1 MG/L
CHOLEST SERPL-MCNC: 99 MG/DL (ref 0–200)
CREAT UR-MCNC: 51.4 MG/DL
HBA1C MFR BLD: 6.6 % (ref 4.8–5.6)
HDLC SERPL-MCNC: 41 MG/DL (ref 40–60)
LDLC SERPL CALC-MCNC: 45 MG/DL (ref 0–100)
LDLC/HDLC SERPL: 1.1 {RATIO}
MICROALBUMIN/CREAT UR: 60.3 MG/G
TRIGL SERPL-MCNC: 65 MG/DL (ref 0–150)
VLDLC SERPL-MCNC: 13 MG/DL (ref 5–40)

## 2019-07-01 ENCOUNTER — TELEPHONE (OUTPATIENT)
Dept: FAMILY MEDICINE CLINIC | Facility: CLINIC | Age: 79
End: 2019-07-01

## 2019-07-01 NOTE — TELEPHONE ENCOUNTER
----- Message from CHEYENNE Lerma sent at 7/1/2019 10:46 AM CDT -----  Her repeat labs look great.  Her A1C is down to less than 7 which is great.  Cholesterol levels are well controlled.  Continue current medications as prescribed.

## 2019-07-11 NOTE — PROGRESS NOTES
QUICK REFERENCE INFORMATION:  The ABCs of the Annual Wellness Visit    Initial Medicare Wellness Visit    HEALTH RISK ASSESSMENT    : 1940    Recent Hospitalizations:  No hospitalization(s) within the last year..  ccc      Current Medical Providers:  Patient Care Team:  Jodie Aguilera APRN as PCP - General  Edgar Arteaga MD as Consulting Physician (Endocrinology)  Cinthia Garrido MA as Medical Assistant        Smoking Status:  Social History     Tobacco Use   Smoking Status Never Smoker   Smokeless Tobacco Never Used       Alcohol Consumption:  Social History     Substance and Sexual Activity   Alcohol Use No       Depression Screen:   PHQ-2/PHQ-9 Depression Screening 2019   Little interest or pleasure in doing things 0   Feeling down, depressed, or hopeless 0   Total Score 0       Health Habits and Functional and Cognitive Screening:  Functional & Cognitive Status 2019   Do you have difficulty preparing food and eating? No   Do you have difficulty bathing yourself, getting dressed or grooming yourself? No   Do you have difficulty using the toilet? No   Do you have difficulty moving around from place to place? No   Do you have trouble with steps or getting out of a bed or a chair? No   In the past year have you fallen or experienced a near fall? No   Current Diet Diabetic Diet   Dental Exam Not up to date   Eye Exam Up to date   Exercise (times per week) 3 times per week   Current Exercise Activities Include Walking   Do you need help using the phone?  No   Are you deaf or do you have serious difficulty hearing?  No   Do you need help with transportation? No   Do you need help shopping? No   Do you need help preparing meals?  No   Do you need help with housework?  No   Do you need help with laundry? No   Do you need help taking your medications? No   Do you need help managing money? No   Do you ever drive or ride in a car without wearing a seat belt? No   Have you felt unusual  stress, anger or loneliness in the last month? No   Who do you live with? Spouse   If you need help, do you have trouble finding someone available to you? No   Have you been bothered in the last four weeks by sexual problems? No   Do you have difficulty concentrating, remembering or making decisions? No           Does the patient have evidence of cognitive impairment? No    Asiprin use counseling: Taking ASA appropriately as indicated      Recent Lab Results:       Lab Results   Component Value Date    HGBA1C 6.60 (H) 06/28/2019     Lab Results   Component Value Date    CHOL 99 06/28/2019    TRIG 65 06/28/2019    HDL 41 06/28/2019    VLDL 13 06/28/2019    LDLHDL 1.10 06/28/2019           Age-appropriate Screening Schedule:  Refer to the list below for future screening recommendations based on patient's age, sex and/or medical conditions. Orders for these recommended tests are listed in the plan section. The patient has been provided with a written plan.    Health Maintenance   Topic Date Due   • ZOSTER VACCINE (2 of 2) 01/01/2099 (Originally 10/10/2016)   • INFLUENZA VACCINE  08/01/2019   • MAMMOGRAM  08/24/2019   • HEMOGLOBIN A1C  12/28/2019   • LIPID PANEL  06/28/2020   • URINE MICROALBUMIN  06/28/2020   • DXA SCAN  08/24/2020   • COLONOSCOPY  11/08/2023   • TDAP/TD VACCINES (2 - Td) 08/15/2026   • PNEUMOCOCCAL VACCINES (65+ LOW/MEDIUM RISK)  Completed        Subjective   History of Present Illness    Brenda Garzon is a 78 y.o. female who presents for an Annual Wellness Visit.    The following portions of the patient's history were reviewed and updated as appropriate: allergies, current medications, past family history, past medical history, past social history, past surgical history and problem list.    Outpatient Medications Prior to Visit   Medication Sig Dispense Refill   • ACCU-CHEK YUSRA PLUS test strip USE AS DIRECTED 200 each 5   • ACCU-CHEK FASTCLIX LANCETS misc USE AS DIRECTED 200 each 5   •  acetaminophen (TYLENOL 8 HOUR ARTHRITIS PAIN) 650 MG 8 hr tablet Take 2 tablets by mouth Every 8 (Eight) Hours As Needed for Mild Pain . 120 tablet 5   • aspirin 81 MG tablet Take 81 mg by mouth daily.     • atorvastatin (LIPITOR) 20 MG tablet Take 1 tablet by mouth Every Night. 90 tablet 1   • butalbital-acetaminophen-caffeine (ESGIC) -40 MG per tablet Take 1 tablet by mouth Every 4 (Four) Hours As Needed for Headache. 30 tablet 0   • Dulaglutide 1.5 MG/0.5ML solution pen-injector Inject 1.5 mg under the skin into the appropriate area as directed 1 (One) Time Per Week. 12 pen 11   • Glucose 15 GM/32ML gel Take 32 mL by mouth 1 (One) Time As Needed (hypoglycemia). 96 mL 11   • Insulin Glargine (TOUJEO SOLOSTAR) 300 UNIT/ML solution pen-injector Inject 15 Units under the skin into the appropriate area as directed every night at bedtime. 3 pen 11   • Insulin Pen Needle (PEN NEEDLES) 32G X 4 MM misc 1 each Daily. 200 each 4   • losartan (COZAAR) 50 MG tablet Take 1 tablet by mouth 2 (Two) Times a Day. 180 tablet 1   • meclizine (ANTIVERT) 25 MG tablet Take 0.5-1 tablets by mouth 3 (Three) Times a Day As Needed for dizziness. 90 tablet 0   • metFORMIN ER (GLUCOPHAGE XR) 500 MG 24 hr tablet 1 with bkfast and 1 w supper 180 tablet 11   • raNITIdine (ZANTAC) 150 MG tablet TAKE 1 TABLET BY MOUTH TWO  TIMES DAILY 180 tablet 1   • sucralfate (CARAFATE) 1 g tablet Take 1 tablet by mouth 4 (Four) Times a Day Before Meals & at Bedtime As Needed (acid reflux). 120 tablet 2   • tiZANidine (ZANAFLEX) 4 MG tablet Take 0.5-1 tablets by mouth At Night As Needed for Muscle Spasms. 30 tablet 5   • verapamil SR (CALAN-SR) 120 MG CR tablet Take 1 tablet by mouth Every Night. 90 tablet 3   • vitamin D (ERGOCALCIFEROL) 30127 units capsule capsule Take 1 capsule by mouth Every 7 (Seven) Days. 5 capsule 5   • gabapentin (NEURONTIN) 300 MG capsule Take 1 capsule by mouth Every Night. 90 capsule 3     No facility-administered medications  "prior to visit.        Patient Active Problem List   Diagnosis   • Essential hypertension   • Vitamin D deficiency   • Type 2 diabetes mellitus, uncontrolled (CMS/[facility])   • Thyroid nodule   • Colon polyp   • Postmenopausal state   • Dyslipidemia   • Type 2 diabetes mellitus with hypoglycemia without coma, with long-term current use of insulin (CMS/[facility])   • Hypertension associated with diabetes (CMS/HCC)   • Mixed diabetic hyperlipidemia associated with type 2 diabetes mellitus (CMS/HCC)   • Urinary tract infectious disease   • Type 2 diabetes mellitus (CMS/HCC)   • Neurologic disorder associated with diabetes mellitus (CMS/[facility])   • Neck pain   • Multinodular goiter   • Irregular heartbeat   • Hypertensive disorder   • Hyperlipidemia   • Hiatal hernia   • GERD (gastroesophageal reflux disease)   • Chest pain       Advance Care Planning:  Patient does not have an advance directive - information provided to the patient today    Identification of Risk Factors:  Risk factors include: Cardiovascular risk  Chronic Pain   Fall Risk  Osteoprorosis Risk  Polypharmacy.    Review of Systems    Compared to one year ago, the patient feels her physical health is the same.  Compared to one year ago, the patient feels her mental health is the same.    Objective     Physical Exam    Vitals:    06/28/19 1437   BP: 120/72   BP Location: Left arm   Patient Position: Sitting   Cuff Size: Adult   Pulse: 72   Temp: 98.2 °F (36.8 °C)   TempSrc: Oral   SpO2: 98%   Weight: 47 kg (103 lb 9.6 oz)   Height: 149.9 cm (59\")   PainSc: 0-No pain       Patient's Body mass index is 20.92 kg/m². BMI is within normal parameters. No follow-up required..      Assessment/Plan   Patient Self-Management and Personalized Health Advice  The patient has been provided with information about: prevention of cardiac or vascular disease, fall prevention and designing advance directives and preventive services including:   · Advance directive, Fall Risk assessment " done.    Visit Diagnoses:    ICD-10-CM ICD-9-CM   1. Medicare annual wellness visit, initial Z00.00 V70.0       No orders of the defined types were placed in this encounter.      Outpatient Encounter Medications as of 6/28/2019   Medication Sig Dispense Refill   • ACCU-CHEK YUSRA PLUS test strip USE AS DIRECTED 200 each 5   • ACCU-CHEK FASTCLIX LANCETS misc USE AS DIRECTED 200 each 5   • acetaminophen (TYLENOL 8 HOUR ARTHRITIS PAIN) 650 MG 8 hr tablet Take 2 tablets by mouth Every 8 (Eight) Hours As Needed for Mild Pain . 120 tablet 5   • aspirin 81 MG tablet Take 81 mg by mouth daily.     • atorvastatin (LIPITOR) 20 MG tablet Take 1 tablet by mouth Every Night. 90 tablet 1   • butalbital-acetaminophen-caffeine (ESGIC) -40 MG per tablet Take 1 tablet by mouth Every 4 (Four) Hours As Needed for Headache. 30 tablet 0   • Dulaglutide 1.5 MG/0.5ML solution pen-injector Inject 1.5 mg under the skin into the appropriate area as directed 1 (One) Time Per Week. 12 pen 11   • Glucose 15 GM/32ML gel Take 32 mL by mouth 1 (One) Time As Needed (hypoglycemia). 96 mL 11   • Insulin Glargine (TOUJEO SOLOSTAR) 300 UNIT/ML solution pen-injector Inject 15 Units under the skin into the appropriate area as directed every night at bedtime. 3 pen 11   • Insulin Pen Needle (PEN NEEDLES) 32G X 4 MM misc 1 each Daily. 200 each 4   • losartan (COZAAR) 50 MG tablet Take 1 tablet by mouth 2 (Two) Times a Day. 180 tablet 1   • meclizine (ANTIVERT) 25 MG tablet Take 0.5-1 tablets by mouth 3 (Three) Times a Day As Needed for dizziness. 90 tablet 0   • metFORMIN ER (GLUCOPHAGE XR) 500 MG 24 hr tablet 1 with bkfast and 1 w supper 180 tablet 11   • raNITIdine (ZANTAC) 150 MG tablet TAKE 1 TABLET BY MOUTH TWO  TIMES DAILY 180 tablet 1   • sucralfate (CARAFATE) 1 g tablet Take 1 tablet by mouth 4 (Four) Times a Day Before Meals & at Bedtime As Needed (acid reflux). 120 tablet 2   • tiZANidine (ZANAFLEX) 4 MG tablet Take 0.5-1 tablets by mouth At  Night As Needed for Muscle Spasms. 30 tablet 5   • verapamil SR (CALAN-SR) 120 MG CR tablet Take 1 tablet by mouth Every Night. 90 tablet 3   • vitamin D (ERGOCALCIFEROL) 92317 units capsule capsule Take 1 capsule by mouth Every 7 (Seven) Days. 5 capsule 5   • gabapentin (NEURONTIN) 300 MG capsule Take 1 capsule by mouth Every Night. 90 capsule 3     No facility-administered encounter medications on file as of 6/28/2019.        Reviewed use of high risk medication in the elderly: yes  Reviewed for potential of harmful drug interactions in the elderly: no    Follow Up:  Return in about 1 year (around 6/28/2020) for Medicare Wellness subsequent.   An action plan was developed by the patient and reviewed in the office.  See attached document.  An After Visit Summary and PPPS with all of these plans were given to the patient.          This document has been electronically signed by CHEYENNE Lerma on July 11, 2019

## 2019-07-14 NOTE — PROGRESS NOTES
Subjective   Brenda Garzon is a 78 y.o. female.     She presents today for her routine follow up on chronic medical problems.  BP is well controlled today in the office.  Reports her blood sugars have been fairly well controlled.  No recent episodes of hypoglycemia.  She is doing well on her statin therapy for cholesterol management without side effects.  She reports that her arthritis symptoms have been fairly well controlled on the Tylenol Arthritis that she is currntly taking.  She has felt a bit fatigued recently, but she is otherwise without any other new complaints today in the office.  She is without any other new complaints today in the office.  She is due for repeat fasting labs today in the office.      Hypertension   This is a chronic problem. The current episode started more than 1 year ago. The problem has been resolved since onset. The problem is controlled. Pertinent negatives include no anxiety or sweats. There are no associated agents to hypertension. Risk factors for coronary artery disease include post-menopausal state, diabetes mellitus, dyslipidemia, family history, sedentary lifestyle and stress. Past treatments include angiotensin blockers. Current antihypertension treatment includes angiotensin blockers. The current treatment provides significant improvement. Compliance problems include diet and exercise.  Hypertensive end-organ damage includes kidney disease. Identifiable causes of hypertension include chronic renal disease.   Diabetes   She presents for her follow-up diabetic visit. She has type 2 diabetes mellitus. Her disease course has been stable. There are no hypoglycemic associated symptoms. Pertinent negatives for hypoglycemia include no sweats. There are no hypoglycemic complications. Symptoms are stable. There are no diabetic complications. Risk factors for coronary artery disease include diabetes mellitus, dyslipidemia, family history, post-menopausal, sedentary lifestyle and  stress. Current diabetic treatment includes oral agent (triple therapy) and insulin injections. She is compliant with treatment most of the time. Her weight is stable. She is following a generally healthy diet. She has not had a previous visit with a dietitian. She rarely participates in exercise. An ACE inhibitor/angiotensin II receptor blocker is being taken. She does not see a podiatrist.Eye exam is not current.   Arthritis   Presents for follow-up visit. She complains of pain and joint warmth. The symptoms have been stable. Associated symptoms include pain at night and pain while resting. Pertinent negatives include no dry eyes, dysuria, Raynaud's syndrome or uveitis. Compliance with total regimen is %. Compliance with medications is %.        The following portions of the patient's history were reviewed and updated as appropriate: allergies, current medications, past family history, past medical history, past social history, past surgical history and problem list.    Review of Systems   Constitutional: Negative.    HENT: Negative.    Eyes: Negative.    Respiratory: Negative.    Cardiovascular: Negative.    Gastrointestinal: Negative.    Genitourinary: Negative for dysuria.   Musculoskeletal: Positive for arthritis.   Skin: Negative.    Allergic/Immunologic: Negative.    Neurological: Negative.    Hematological: Negative.    Psychiatric/Behavioral: Negative.        Objective   Physical Exam   Constitutional: She is oriented to person, place, and time. Vital signs are normal. She appears well-developed and well-nourished. No distress.   HENT:   Head: Normocephalic.   Right Ear: External ear normal.   Left Ear: External ear normal.   Nose: Nose normal.   Mouth/Throat: Oropharynx is clear and moist. No oropharyngeal exudate.   Eyes: Conjunctivae and EOM are normal. Pupils are equal, round, and reactive to light. Right eye exhibits no discharge. Left eye exhibits no discharge.   Neck: Normal range of  motion. Neck supple. No tracheal deviation present. No thyromegaly present.   Cardiovascular: Regular rhythm and normal heart sounds. Bradycardia present. Exam reveals no gallop and no friction rub.   No murmur heard.  Pulmonary/Chest: Effort normal and breath sounds normal. No respiratory distress. She has no wheezes. She has no rales. She exhibits no tenderness.   Musculoskeletal: Normal range of motion.   Lymphadenopathy:     She has no cervical adenopathy.   Neurological: She is alert and oriented to person, place, and time.   Skin: Skin is warm and dry. Capillary refill takes less than 2 seconds. No rash noted. She is not diaphoretic. No erythema. No pallor.   Psychiatric: She has a normal mood and affect. Her behavior is normal. Judgment and thought content normal.   Nursing note and vitals reviewed.        Assessment/Plan   Brenda was seen today for follow-up, hypertension and diabetes.    Diagnoses and all orders for this visit:    Essential hypertension  -     Hemoglobin A1c  -     Lipid Panel  -     Microalbumin / Creatinine Urine Ratio - Urine, Clean Catch    Uncontrolled type 2 diabetes mellitus with hyperglycemia (CMS/[facility])  -     Hemoglobin A1c  -     Lipid Panel  -     Microalbumin / Creatinine Urine Ratio - Urine, Clean Catch    Bradycardia  -     ECG 12 Lead               Patient's Body mass index is 20.7 kg/m². BMI is within normal parameters. No follow-up required..  EKG performed in the office which shows bradycardia.  Repeat fasting labs.  Continue current medications.  Follow up in 3 months for routine follow up.  Follow up sooner for problems/concerns.  Patient verbalized understanding and agreement with plan of care.        This document has been electronically signed by CHEYENNE Lerma on July 14, 2019 9:06 AM

## 2019-09-25 ENCOUNTER — OFFICE VISIT (OUTPATIENT)
Dept: FAMILY MEDICINE CLINIC | Facility: CLINIC | Age: 79
End: 2019-09-25

## 2019-09-25 VITALS
HEIGHT: 59 IN | SYSTOLIC BLOOD PRESSURE: 120 MMHG | OXYGEN SATURATION: 98 % | BODY MASS INDEX: 20.66 KG/M2 | HEART RATE: 67 BPM | WEIGHT: 102.5 LBS | DIASTOLIC BLOOD PRESSURE: 80 MMHG | RESPIRATION RATE: 20 BRPM | TEMPERATURE: 98.3 F

## 2019-09-25 DIAGNOSIS — Z23 NEEDS FLU SHOT: ICD-10-CM

## 2019-09-25 DIAGNOSIS — Z12.31 ENCOUNTER FOR SCREENING MAMMOGRAM FOR BREAST CANCER: ICD-10-CM

## 2019-09-25 DIAGNOSIS — E04.2 MULTINODULAR GOITER: ICD-10-CM

## 2019-09-25 DIAGNOSIS — E11.65 UNCONTROLLED TYPE 2 DIABETES MELLITUS WITH HYPERGLYCEMIA (HCC): Primary | Chronic | ICD-10-CM

## 2019-09-25 PROCEDURE — 99213 OFFICE O/P EST LOW 20 MIN: CPT | Performed by: NURSE PRACTITIONER

## 2019-09-25 PROCEDURE — 90674 CCIIV4 VAC NO PRSV 0.5 ML IM: CPT | Performed by: NURSE PRACTITIONER

## 2019-09-25 PROCEDURE — G0008 ADMIN INFLUENZA VIRUS VAC: HCPCS | Performed by: NURSE PRACTITIONER

## 2019-09-25 RX ORDER — METFORMIN HYDROCHLORIDE 500 MG/1
TABLET, EXTENDED RELEASE ORAL
Qty: 180 TABLET | Refills: 1 | Status: SHIPPED | OUTPATIENT
Start: 2019-09-25 | End: 2020-09-23 | Stop reason: SDUPTHER

## 2019-10-09 ENCOUNTER — TELEPHONE (OUTPATIENT)
Dept: FAMILY MEDICINE CLINIC | Facility: CLINIC | Age: 79
End: 2019-10-09

## 2019-10-09 NOTE — TELEPHONE ENCOUNTER
Benign-appearing nodules noted within the thyroid bilaterally.  They are all less than 1 cm in size.  Follow-up US is recommended in 1 year.

## 2019-10-10 DIAGNOSIS — E04.2 MULTINODULAR GOITER: ICD-10-CM

## 2019-10-10 NOTE — PROGRESS NOTES
Subjective   Brenda Garzon is a 78 y.o. female.     She presents today for her routine follow up on chronic medical problems.  BP is well controlled today in the office.  Reports her blood sugars have been fairly well controlled.  No recent episodes of hypoglycemia.  She is doing well on her statin therapy for cholesterol management without side effects.  She reports that her arthritis symptoms have been fairly well controlled on the Tylenol Arthritis that she is currently taking.  She is due for her routine screening mammogram.  She is also due for an ultrasound of her thyroid.  She would like to receive her flu shot today in the office.  She does need a refill on some of her routine daily medications.  She is without any new complaints today in the office.      Hypertension   This is a chronic problem. The current episode started more than 1 year ago. The problem has been resolved since onset. The problem is controlled. Pertinent negatives include no anxiety or sweats. There are no associated agents to hypertension. Risk factors for coronary artery disease include post-menopausal state, diabetes mellitus, dyslipidemia, family history, sedentary lifestyle and stress. Past treatments include angiotensin blockers. Current antihypertension treatment includes angiotensin blockers. The current treatment provides significant improvement. Compliance problems include diet and exercise.  Hypertensive end-organ damage includes kidney disease. Identifiable causes of hypertension include chronic renal disease.   Diabetes   She presents for her follow-up diabetic visit. She has type 2 diabetes mellitus. Her disease course has been stable. There are no hypoglycemic associated symptoms. Pertinent negatives for hypoglycemia include no sweats. Associated symptoms include fatigue. There are no hypoglycemic complications. Symptoms are stable. There are no diabetic complications. Risk factors for coronary artery disease include  diabetes mellitus, dyslipidemia, family history, post-menopausal, sedentary lifestyle and stress. Current diabetic treatment includes oral agent (triple therapy) and insulin injections. She is compliant with treatment most of the time. Her weight is stable. She is following a generally healthy diet. She has not had a previous visit with a dietitian. She rarely participates in exercise. An ACE inhibitor/angiotensin II receptor blocker is being taken. She does not see a podiatrist.Eye exam is not current.   Arthritis   Presents for follow-up visit. She complains of pain and joint warmth. The symptoms have been stable. Associated symptoms include fatigue, pain at night and pain while resting. Pertinent negatives include no dry eyes, dysuria, Raynaud's syndrome or uveitis. Compliance with total regimen is %. Compliance with medications is %.        The following portions of the patient's history were reviewed and updated as appropriate: allergies, current medications, past family history, past medical history, past social history, past surgical history and problem list.    Review of Systems   Constitutional: Positive for fatigue.   HENT: Negative.    Eyes: Negative.    Respiratory: Negative.    Cardiovascular: Negative.    Gastrointestinal: Negative.    Genitourinary: Negative for dysuria.   Musculoskeletal: Positive for arthritis.   Skin: Negative.    Allergic/Immunologic: Negative.    Neurological: Negative.    Hematological: Negative.    Psychiatric/Behavioral: Negative.        Objective   Physical Exam   Constitutional: She is oriented to person, place, and time. Vital signs are normal. She appears well-developed and well-nourished. No distress.   HENT:   Head: Normocephalic.   Right Ear: External ear normal.   Left Ear: External ear normal.   Nose: Nose normal.   Mouth/Throat: Oropharynx is clear and moist. No oropharyngeal exudate.   Eyes: Conjunctivae and EOM are normal. Pupils are equal, round, and  reactive to light. Right eye exhibits no discharge. Left eye exhibits no discharge.   Neck: Normal range of motion. Neck supple. No tracheal deviation present. Thyromegaly present.   Cardiovascular: Normal rate, regular rhythm and normal heart sounds. Exam reveals no gallop and no friction rub.   No murmur heard.  Pulmonary/Chest: Effort normal and breath sounds normal. No respiratory distress. She has no wheezes. She has no rales. She exhibits no tenderness.   Musculoskeletal: Normal range of motion.   Lymphadenopathy:     She has no cervical adenopathy.   Neurological: She is alert and oriented to person, place, and time.   Skin: Skin is warm and dry. Capillary refill takes less than 2 seconds. No rash noted. She is not diaphoretic. No erythema. No pallor.   Psychiatric: She has a normal mood and affect. Her behavior is normal. Judgment and thought content normal.   Nursing note and vitals reviewed.        Assessment/Plan   Brenda was seen today for hypertension and diabetes.    Diagnoses and all orders for this visit:    Uncontrolled type 2 diabetes mellitus with hyperglycemia (CMS/[facility])  -     metFORMIN ER (GLUCOPHAGE XR) 500 MG 24 hr tablet; 1 with bkfast and 1 w supper    Needs flu shot  -     Fluarix/Fluzone/Afluria/FluLaval ([id number])    Encounter for screening mammogram for breast cancer  -     Mammo Screening Bilateral With CAD; Future    Multinodular goiter  -     US Thyroid; Future               Patient's Body mass index is 20.7 kg/m². BMI is within normal parameters. No follow-up required..    Flu shot given IM in the office.  Schedule for ultrasound of the thyroid.  Scheduled for screening mammogram.  Continue current medications.  Follow up in 3 months for routine follow up.  Follow up sooner for problems/concerns.  Patient verbalized understanding and agreement with plan of care.        This document has been electronically signed by CHEYENNE Lerma on October 10, 2019 1:01 PM

## 2019-11-13 RX ORDER — ATORVASTATIN CALCIUM 20 MG/1
TABLET, FILM COATED ORAL
Qty: 90 TABLET | Refills: 4 | Status: SHIPPED | OUTPATIENT
Start: 2019-11-13 | End: 2021-01-01 | Stop reason: SDUPTHER

## 2019-11-26 DIAGNOSIS — Z12.31 ENCOUNTER FOR SCREENING MAMMOGRAM FOR BREAST CANCER: ICD-10-CM

## 2019-12-05 ENCOUNTER — TELEPHONE (OUTPATIENT)
Dept: FAMILY MEDICINE CLINIC | Facility: CLINIC | Age: 79
End: 2019-12-05

## 2019-12-05 NOTE — TELEPHONE ENCOUNTER
----- Message from CHEYENNE Lerma sent at 12/4/2019  5:00 PM CST -----  Normal mammogram.  Repeat in 1 year.

## 2019-12-23 DIAGNOSIS — Z79.4 TYPE 2 DIABETES MELLITUS WITH COMPLICATION, WITH LONG-TERM CURRENT USE OF INSULIN (HCC): ICD-10-CM

## 2019-12-23 DIAGNOSIS — I10 ESSENTIAL HYPERTENSION: Chronic | ICD-10-CM

## 2019-12-23 DIAGNOSIS — E11.8 TYPE 2 DIABETES MELLITUS WITH COMPLICATION, WITH LONG-TERM CURRENT USE OF INSULIN (HCC): ICD-10-CM

## 2019-12-23 RX ORDER — LOSARTAN POTASSIUM 50 MG/1
50 TABLET ORAL 2 TIMES DAILY
Qty: 180 TABLET | Refills: 1 | Status: SHIPPED | OUTPATIENT
Start: 2019-12-23 | End: 2020-12-08 | Stop reason: SDUPTHER

## 2020-01-30 ENCOUNTER — APPOINTMENT (OUTPATIENT)
Dept: LAB | Facility: HOSPITAL | Age: 80
End: 2020-01-30

## 2020-01-30 ENCOUNTER — OFFICE VISIT (OUTPATIENT)
Dept: FAMILY MEDICINE CLINIC | Facility: CLINIC | Age: 80
End: 2020-01-30

## 2020-01-30 VITALS
RESPIRATION RATE: 20 BRPM | OXYGEN SATURATION: 98 % | DIASTOLIC BLOOD PRESSURE: 70 MMHG | HEART RATE: 72 BPM | SYSTOLIC BLOOD PRESSURE: 120 MMHG | HEIGHT: 59 IN | TEMPERATURE: 98.5 F | WEIGHT: 101.5 LBS | BODY MASS INDEX: 20.46 KG/M2

## 2020-01-30 DIAGNOSIS — I10 ESSENTIAL HYPERTENSION: ICD-10-CM

## 2020-01-30 DIAGNOSIS — M94.9 DISORDER OF CARTILAGE, UNSPECIFIED: ICD-10-CM

## 2020-01-30 DIAGNOSIS — E11.65 UNCONTROLLED TYPE 2 DIABETES MELLITUS WITH HYPERGLYCEMIA (HCC): ICD-10-CM

## 2020-01-30 DIAGNOSIS — R12 HEARTBURN: Primary | ICD-10-CM

## 2020-01-30 DIAGNOSIS — E04.1 THYROID NODULE: ICD-10-CM

## 2020-01-30 PROCEDURE — 82306 VITAMIN D 25 HYDROXY: CPT | Performed by: NURSE PRACTITIONER

## 2020-01-30 PROCEDURE — 85025 COMPLETE CBC W/AUTO DIFF WBC: CPT | Performed by: NURSE PRACTITIONER

## 2020-01-30 PROCEDURE — 80053 COMPREHEN METABOLIC PANEL: CPT | Performed by: NURSE PRACTITIONER

## 2020-01-30 PROCEDURE — 99214 OFFICE O/P EST MOD 30 MIN: CPT | Performed by: NURSE PRACTITIONER

## 2020-01-30 PROCEDURE — 83036 HEMOGLOBIN GLYCOSYLATED A1C: CPT | Performed by: NURSE PRACTITIONER

## 2020-01-30 PROCEDURE — 84443 ASSAY THYROID STIM HORMONE: CPT | Performed by: NURSE PRACTITIONER

## 2020-01-30 RX ORDER — ERGOCALCIFEROL 1.25 MG/1
50000 CAPSULE ORAL
Qty: 5 CAPSULE | Refills: 5 | Status: SHIPPED | OUTPATIENT
Start: 2020-01-30 | End: 2020-11-02 | Stop reason: SDUPTHER

## 2020-01-30 RX ORDER — FAMOTIDINE 20 MG/1
20 TABLET, FILM COATED ORAL 2 TIMES DAILY
Qty: 60 TABLET | Refills: 5 | Status: SHIPPED | OUTPATIENT
Start: 2020-01-30 | End: 2021-01-01

## 2020-01-30 RX ORDER — FAMOTIDINE 20 MG/1
20 TABLET, FILM COATED ORAL 2 TIMES DAILY
Qty: 60 TABLET | Refills: 5 | Status: SHIPPED | OUTPATIENT
Start: 2020-01-30 | End: 2020-01-30 | Stop reason: SDUPTHER

## 2020-01-31 LAB
25(OH)D3 SERPL-MCNC: 29.7 NG/ML (ref 30–100)
ALBUMIN SERPL-MCNC: 4.2 G/DL (ref 3.5–5.2)
ALBUMIN/GLOB SERPL: 1.4 G/DL
ALP SERPL-CCNC: 97 U/L (ref 39–117)
ALT SERPL W P-5'-P-CCNC: 16 U/L (ref 1–33)
ANION GAP SERPL CALCULATED.3IONS-SCNC: 15.9 MMOL/L (ref 5–15)
AST SERPL-CCNC: 28 U/L (ref 1–32)
BASOPHILS # BLD AUTO: 0.06 10*3/MM3 (ref 0–0.2)
BASOPHILS NFR BLD AUTO: 0.6 % (ref 0–1.5)
BILIRUB SERPL-MCNC: 0.4 MG/DL (ref 0.2–1.2)
BUN BLD-MCNC: 21 MG/DL (ref 8–23)
BUN/CREAT SERPL: 22.1 (ref 7–25)
CALCIUM SPEC-SCNC: 9.9 MG/DL (ref 8.6–10.5)
CHLORIDE SERPL-SCNC: 96 MMOL/L (ref 98–107)
CO2 SERPL-SCNC: 24.1 MMOL/L (ref 22–29)
CREAT BLD-MCNC: 0.95 MG/DL (ref 0.57–1)
DEPRECATED RDW RBC AUTO: 38.7 FL (ref 37–54)
EOSINOPHIL # BLD AUTO: 0.11 10*3/MM3 (ref 0–0.4)
EOSINOPHIL NFR BLD AUTO: 1.1 % (ref 0.3–6.2)
ERYTHROCYTE [DISTWIDTH] IN BLOOD BY AUTOMATED COUNT: 13.5 % (ref 12.3–15.4)
GFR SERPL CREATININE-BSD FRML MDRD: 57 ML/MIN/1.73
GLOBULIN UR ELPH-MCNC: 3.1 GM/DL
GLUCOSE BLD-MCNC: 123 MG/DL (ref 65–99)
HBA1C MFR BLD: 6.99 % (ref 4.8–5.6)
HCT VFR BLD AUTO: 35.4 % (ref 34–46.6)
HGB BLD-MCNC: 11.7 G/DL (ref 12–15.9)
IMM GRANULOCYTES # BLD AUTO: 0.03 10*3/MM3 (ref 0–0.05)
IMM GRANULOCYTES NFR BLD AUTO: 0.3 % (ref 0–0.5)
LYMPHOCYTES # BLD AUTO: 1.88 10*3/MM3 (ref 0.7–3.1)
LYMPHOCYTES NFR BLD AUTO: 18.1 % (ref 19.6–45.3)
MCH RBC QN AUTO: 26.8 PG (ref 26.6–33)
MCHC RBC AUTO-ENTMCNC: 33.1 G/DL (ref 31.5–35.7)
MCV RBC AUTO: 81 FL (ref 79–97)
MONOCYTES # BLD AUTO: 0.73 10*3/MM3 (ref 0.1–0.9)
MONOCYTES NFR BLD AUTO: 7 % (ref 5–12)
NEUTROPHILS # BLD AUTO: 7.58 10*3/MM3 (ref 1.7–7)
NEUTROPHILS NFR BLD AUTO: 72.9 % (ref 42.7–76)
NRBC BLD AUTO-RTO: 0 /100 WBC (ref 0–0.2)
PLATELET # BLD AUTO: 266 10*3/MM3 (ref 140–450)
PMV BLD AUTO: 13.5 FL (ref 6–12)
POTASSIUM BLD-SCNC: 5 MMOL/L (ref 3.5–5.2)
PROT SERPL-MCNC: 7.3 G/DL (ref 6–8.5)
RBC # BLD AUTO: 4.37 10*6/MM3 (ref 3.77–5.28)
SODIUM BLD-SCNC: 136 MMOL/L (ref 136–145)
TSH SERPL DL<=0.05 MIU/L-ACNC: 1.16 UIU/ML (ref 0.27–4.2)
WBC NRBC COR # BLD: 10.39 10*3/MM3 (ref 3.4–10.8)

## 2020-02-03 ENCOUNTER — TELEPHONE (OUTPATIENT)
Dept: FAMILY MEDICINE CLINIC | Facility: CLINIC | Age: 80
End: 2020-02-03

## 2020-02-03 NOTE — TELEPHONE ENCOUNTER
Patients daughter called in stating her mom had brought In a form for Jodie to fill out for her on January 30, 2002. She states her mom is able to receive her insulin from the  if Jodie completes a specific part of the form for her and would like to know that status. Please call patients daughter back at the earliest convenience. Call back number is [phone]

## 2020-02-03 NOTE — TELEPHONE ENCOUNTER
Spoke with Jennifer and explained what is needed for patient assistance since pt has prescription insurance.  She will  forms tomorrow and fax them to the companies herself.

## 2020-02-03 NOTE — TELEPHONE ENCOUNTER
----- Message from Ana Man MA sent at 2/3/2020  1:47 PM CST -----  If you have time will you please call Ms Garzon's daughter Jennifer Justin [phone] and explain to her how the Patient Assistant Medication Programs. I tried to explain to her what you told me last week and what Mrs Garzon needs to do. She was not listening to me at all. She said that Jodie just needs to put the type of insulin on the paper and she will send it back to the company. Jennifer has their income information.

## 2020-02-03 NOTE — TELEPHONE ENCOUNTER
Returned call to Jennifer to inform her that I will have Terrisa would call her about the Pt assitance program..

## 2020-02-10 ENCOUNTER — TELEPHONE (OUTPATIENT)
Dept: FAMILY MEDICINE CLINIC | Facility: CLINIC | Age: 80
End: 2020-02-10

## 2020-02-10 NOTE — TELEPHONE ENCOUNTER
----- Message from CHEYENNE Lerma sent at 2/4/2020  9:02 PM CST -----  Vitamin D is just a touch too low. Her diabetes is well controlled. Her kidney function has dropped just a little bit. It is 56%. Make sure she is still seeing nephrology. Otherwise her labs look pretty good.

## 2020-02-17 NOTE — PROGRESS NOTES
Subjective   Brenda Garzon is a 79 y.o. female.     She presents today for her routine follow up on chronic medical problems.  BP is well controlled today in the office.  Reports her blood sugars have been fairly well controlled.  No recent episodes of hypoglycemia.  She is doing well on her statin therapy for cholesterol management without side effects.  She reports that her arthritis symptoms have been fairly well controlled on the Tylenol Arthritis that she is currently taking.  She is due for repeat labs today in the office.  She also has been taking Zantac.  This has been recalled.  She needs a substitution today in the office.  She is otherwise without any other new complaints today in the office.    Hypertension   This is a chronic problem. The current episode started more than 1 year ago. The problem has been resolved since onset. The problem is controlled. Pertinent negatives include no anxiety or sweats. There are no associated agents to hypertension. Risk factors for coronary artery disease include post-menopausal state, diabetes mellitus, dyslipidemia, family history, sedentary lifestyle and stress. Past treatments include angiotensin blockers. Current antihypertension treatment includes angiotensin blockers. The current treatment provides significant improvement. Compliance problems include diet and exercise.  Hypertensive end-organ damage includes kidney disease. Identifiable causes of hypertension include chronic renal disease.   Diabetes   She presents for her follow-up diabetic visit. She has type 2 diabetes mellitus. Her disease course has been stable. There are no hypoglycemic associated symptoms. Pertinent negatives for hypoglycemia include no sweats. Associated symptoms include fatigue. There are no hypoglycemic complications. Symptoms are stable. There are no diabetic complications. Risk factors for coronary artery disease include diabetes mellitus, dyslipidemia, family history,  post-menopausal, sedentary lifestyle and stress. Current diabetic treatment includes oral agent (triple therapy) and insulin injections. She is compliant with treatment most of the time. Her weight is stable. She is following a generally healthy diet. She has not had a previous visit with a dietitian. She rarely participates in exercise. An ACE inhibitor/angiotensin II receptor blocker is being taken. She does not see a podiatrist.Eye exam is not current.   Arthritis   Presents for follow-up visit. She complains of pain and joint warmth. The symptoms have been stable. Associated symptoms include fatigue, pain at night and pain while resting. Pertinent negatives include no dry eyes, dysuria, Raynaud's syndrome or uveitis. Compliance with total regimen is %. Compliance with medications is %.        The following portions of the patient's history were reviewed and updated as appropriate: allergies, current medications, past family history, past medical history, past social history, past surgical history and problem list.    Review of Systems   Constitutional: Positive for fatigue.   HENT: Negative.    Eyes: Negative.    Respiratory: Negative.    Cardiovascular: Negative.    Gastrointestinal: Negative.  Positive for indigestion.   Genitourinary: Negative for dysuria.   Musculoskeletal: Positive for arthritis.   Skin: Negative.    Allergic/Immunologic: Negative.    Neurological: Negative.    Hematological: Negative.    Psychiatric/Behavioral: Negative.        Objective   Physical Exam   Constitutional: She is oriented to person, place, and time. Vital signs are normal. She appears well-developed and well-nourished. No distress.   HENT:   Head: Normocephalic.   Right Ear: External ear normal.   Left Ear: External ear normal.   Nose: Nose normal.   Mouth/Throat: Oropharynx is clear and moist. No oropharyngeal exudate.   Eyes: Pupils are equal, round, and reactive to light. Conjunctivae and EOM are normal. Right  eye exhibits no discharge. Left eye exhibits no discharge.   Neck: Normal range of motion. Neck supple. No tracheal deviation present. No thyromegaly present.   Cardiovascular: Normal rate, regular rhythm and normal heart sounds. Exam reveals no gallop and no friction rub.   No murmur heard.  Pulmonary/Chest: Effort normal and breath sounds normal. No respiratory distress. She has no wheezes. She has no rales. She exhibits no tenderness.   Musculoskeletal: Normal range of motion.   Lymphadenopathy:     She has no cervical adenopathy.   Neurological: She is alert and oriented to person, place, and time.   Skin: Skin is warm and dry. Capillary refill takes less than 2 seconds. No rash noted. She is not diaphoretic. No erythema. No pallor.   Psychiatric: She has a normal mood and affect. Her behavior is normal. Judgment and thought content normal.   Nursing note and vitals reviewed.        Assessment/Plan   Brenda was seen today for follow-up, diabetes and hypertension.    Diagnoses and all orders for this visit:    Heartburn  -     Discontinue: famotidine (PEPCID) 20 MG tablet; Take 1 tablet by mouth 2 (Two) Times a Day.  -     CBC & Differential  -     Comprehensive Metabolic Panel  -     Hemoglobin A1c  -     TSH  -     Vitamin D 25 Hydroxy  -     Urinalysis With Culture If Indicated - Urine, Clean Catch  -     famotidine (PEPCID) 20 MG tablet; Take 1 tablet by mouth 2 (Two) Times a Day.  -     CBC Auto Differential  -     Manual Differential; Future  -     Cancel: Manual Differential    Essential hypertension  -     CBC & Differential  -     Comprehensive Metabolic Panel  -     Hemoglobin A1c  -     TSH  -     Vitamin D 25 Hydroxy  -     Urinalysis With Culture If Indicated - Urine, Clean Catch  -     CBC Auto Differential  -     Manual Differential; Future  -     Cancel: Manual Differential    Uncontrolled type 2 diabetes mellitus with hyperglycemia (CMS/HCC)  -     CBC & Differential  -     Comprehensive Metabolic  Panel  -     Hemoglobin A1c  -     TSH  -     Vitamin D 25 Hydroxy  -     Urinalysis With Culture If Indicated - Urine, Clean Catch  -     CBC Auto Differential  -     Manual Differential; Future  -     Cancel: Manual Differential    Thyroid nodule  -     CBC & Differential  -     Comprehensive Metabolic Panel  -     Hemoglobin A1c  -     TSH  -     Vitamin D 25 Hydroxy  -     Urinalysis With Culture If Indicated - Urine, Clean Catch  -     CBC Auto Differential  -     Manual Differential; Future  -     Cancel: Manual Differential    Disorder of cartilage, unspecified   -     Vitamin D 25 Hydroxy    Other orders  -     vitamin D (ERGOCALCIFEROL) 1.25 MG (57636 UT) capsule capsule; Take 1 capsule by mouth Every 7 (Seven) Days.               Patient's Body mass index is 20.5 kg/m². BMI is within normal parameters. No follow-up required..    Lab following office visit.  Stop Zantac.  Start Pepcid twice daily.  Continue all other current medications.  Continued follow-up with specialist as scheduled.  Follow up in 3 months for routine follow up.  Follow up sooner for problems/concerns.  Patient verbalized understanding and agreement with plan of care.        This document has been electronically signed by CHEYENNE Lerma on February 16, 2020 8:18 PM

## 2020-03-05 ENCOUNTER — TELEPHONE (OUTPATIENT)
Dept: FAMILY MEDICINE CLINIC | Facility: CLINIC | Age: 80
End: 2020-03-05

## 2020-03-05 NOTE — TELEPHONE ENCOUNTER
Tried calling patient to let her know her Trulicity came in at the office from [facility] no answer and no vm

## 2020-03-16 ENCOUNTER — TELEPHONE (OUTPATIENT)
Dept: FAMILY MEDICINE CLINIC | Facility: CLINIC | Age: 80
End: 2020-03-16

## 2020-03-16 NOTE — TELEPHONE ENCOUNTER
VALERIANO KWOK PATIENT'S DAUGHTER; NEEDS TO SPEAK ABOUT A PAPER REGARDING THE PATIENT;     WANTS TRISH TO CALL HER BACK    VALERIANO ( VERBAL VERIFIED) [phone]

## 2020-03-16 NOTE — TELEPHONE ENCOUNTER
I returned call to Jennifer to inform her that the paper that was brought to [facility] was blank and did not have the name of the medication on it and was given back to Mrs Garzon.

## 2020-03-26 ENCOUNTER — TELEPHONE (OUTPATIENT)
Dept: FAMILY MEDICINE CLINIC | Facility: CLINIC | Age: 80
End: 2020-03-26

## 2020-03-26 NOTE — TELEPHONE ENCOUNTER
----- Message from Arias Quintanilla sent at 3/26/2020 12:45 PM CDT -----  Regarding: patient assistance  Patients daughter Jennifer called and stated that the medication she needs the form for is Toujeo and once paperwork is completed can you mail it to patient as patient does not want to come and get it due to COVID-19

## 2020-04-08 ENCOUNTER — TELEPHONE (OUTPATIENT)
Dept: FAMILY MEDICINE CLINIC | Facility: CLINIC | Age: 80
End: 2020-04-08

## 2020-04-08 DIAGNOSIS — E11.649 TYPE 2 DIABETES MELLITUS WITH HYPOGLYCEMIA WITHOUT COMA, WITH LONG-TERM CURRENT USE OF INSULIN (HCC): ICD-10-CM

## 2020-04-08 DIAGNOSIS — Z79.4 TYPE 2 DIABETES MELLITUS WITH HYPOGLYCEMIA WITHOUT COMA, WITH LONG-TERM CURRENT USE OF INSULIN (HCC): ICD-10-CM

## 2020-04-08 NOTE — TELEPHONE ENCOUNTER
PATIENTS DAUGHTER IS REQUESTING  Insulin Glargine (TOUJEO SOLOSTAR) 300 UNIT/ML solution pen-injector  THE PATIENT IS DOWN TO ONE PEN AND MAY LAST A FEW DAYS       GOOD CONTACT PATIENTS DAUGHTER      [phone]            NEW PHARMACY     [facility]   FAX NUMBER [phone]

## 2020-04-20 ENCOUNTER — TELEPHONE (OUTPATIENT)
Dept: FAMILY MEDICINE CLINIC | Facility: CLINIC | Age: 80
End: 2020-04-20

## 2020-04-20 NOTE — TELEPHONE ENCOUNTER
Tried calling to let patient know that due to COVID-19 need to change to phone visit.  No answer left message

## 2020-04-28 ENCOUNTER — OFFICE VISIT (OUTPATIENT)
Dept: FAMILY MEDICINE CLINIC | Facility: CLINIC | Age: 80
End: 2020-04-28

## 2020-04-28 VITALS — BODY MASS INDEX: 20.36 KG/M2 | HEIGHT: 59 IN | WEIGHT: 101 LBS

## 2020-04-28 DIAGNOSIS — Z79.4 TYPE 2 DIABETES MELLITUS WITH HYPOGLYCEMIA WITHOUT COMA, WITH LONG-TERM CURRENT USE OF INSULIN (HCC): ICD-10-CM

## 2020-04-28 DIAGNOSIS — R31.21 ASYMPTOMATIC MICROSCOPIC HEMATURIA: ICD-10-CM

## 2020-04-28 DIAGNOSIS — E11.59 HYPERTENSION ASSOCIATED WITH DIABETES (HCC): ICD-10-CM

## 2020-04-28 DIAGNOSIS — I15.2 HYPERTENSION ASSOCIATED WITH DIABETES (HCC): ICD-10-CM

## 2020-04-28 DIAGNOSIS — K21.9 GASTROESOPHAGEAL REFLUX DISEASE WITHOUT ESOPHAGITIS: ICD-10-CM

## 2020-04-28 DIAGNOSIS — E11.649 TYPE 2 DIABETES MELLITUS WITH HYPOGLYCEMIA WITHOUT COMA, WITH LONG-TERM CURRENT USE OF INSULIN (HCC): ICD-10-CM

## 2020-04-28 DIAGNOSIS — E04.2 MULTINODULAR GOITER: ICD-10-CM

## 2020-04-28 DIAGNOSIS — K44.9 HIATAL HERNIA: ICD-10-CM

## 2020-04-28 DIAGNOSIS — R94.4 DECREASED GFR: ICD-10-CM

## 2020-04-28 DIAGNOSIS — I10 ESSENTIAL HYPERTENSION: Primary | Chronic | ICD-10-CM

## 2020-04-28 DIAGNOSIS — E55.9 VITAMIN D DEFICIENCY: Chronic | ICD-10-CM

## 2020-04-28 DIAGNOSIS — E78.5 DYSLIPIDEMIA: Chronic | ICD-10-CM

## 2020-04-28 PROCEDURE — 99213 OFFICE O/P EST LOW 20 MIN: CPT | Performed by: NURSE PRACTITIONER

## 2020-04-28 NOTE — PATIENT INSTRUCTIONS
Type 2 Diabetes Mellitus, Self Care, Adult  Caring for yourself after you have been diagnosed with type 2 diabetes (type 2 diabetes mellitus) means keeping your blood sugar (glucose) under control with a balance of:  · Nutrition.  · Exercise.  · Lifestyle changes.  · Medicines or insulin, if necessary.  · Support from your team of health care providers and others.  The following information explains what you need to know to manage your diabetes at home.  What are the risks?  Having diabetes can put you at risk for other long-term (chronic) conditions, such as heart disease and kidney disease. Your health care provider may prescribe medicines to help prevent complications from diabetes. These medicines may include:  · Aspirin.  · Medicine to lower cholesterol.  · Medicine to control blood pressure.  How to monitor blood glucose    · Check your blood glucose every day, as often as told by your health care provider.  · Have your A1c (hemoglobin A1c) level checked two or more times a year, or as often as told by your health care provider.  Your health care provider will set individualized treatment goals for you. Generally, the goal of treatment is to maintain the following blood glucose levels:  · Before meals (preprandial):  mg/dL (4.4-7.2 mmol/L).  · After meals (postprandial): below 180 mg/dL (10 mmol/L).  · A1c level: less than 7%.  How to manage hyperglycemia and hypoglycemia  Hyperglycemia symptoms  Hyperglycemia, also called high blood glucose, occurs when blood glucose is too high. Make sure you know the early signs of hyperglycemia, such as:  · Increased thirst.  · Hunger.  · Feeling very tired.  · Needing to urinate more often than usual.  · Blurry vision.  Hypoglycemia symptoms  Hypoglycemia, also called low blood glucose, occurs with a blood glucose level at or below 70 mg/dL (3.9 mmol/L). The risk for hypoglycemia increases during or after exercise, during sleep, during illness, and when skipping  meals or not eating for a long time (fasting).  It is important to know the symptoms of hypoglycemia and treat it right away. Always have a 15-gram rapid-acting carbohydrate snack with you to treat low blood glucose. Family members and close friends should also know the symptoms and should understand how to treat hypoglycemia, in case you are not able to treat yourself. Symptoms may include:  · Hunger.  · Anxiety.  · Sweating and feeling clammy.  · Confusion.  · Dizziness or feeling light-headed.  · Sleepiness.  · Nausea.  · Increased heart rate.  · Headache.  · Blurry vision.  · Irritability.  · A change in coordination.  · Tingling or numbness around the mouth, lips, or tongue.  · Restless sleep.  · Fainting.  · Seizure.  Treating hypoglycemia  If you are alert and able to swallow safely, follow the 15:15 rule:  · Take 15 grams of a rapid-acting carbohydrate. Talk with your health care provider about how much you should take.  · Rapid-acting options include:  ? Glucose pills (take 15 grams).  ? 6-8 pieces of hard candy.  ? 4-6 oz (120-150 mL) of fruit juice.  ? 4-6 oz (120-150 mL) of regular (not diet) soda.  ? 1 Tbsp (15 mL) honey or sugar.  · Check your blood glucose 15 minutes after you take the carbohydrate.  · If the repeat blood glucose level is still at or below 70 mg/dL (3.9 mmol/L), take 15 grams of a carbohydrate again.  · If your blood glucose level does not increase above 70 mg/dL (3.9 mmol/L) after 3 tries, seek emergency medical care.  · After your blood glucose level returns to normal, eat a meal or a snack within 1 hour.  Treating severe hypoglycemia  Severe hypoglycemia is when your blood glucose level is at or below 54 mg/dL (3 mmol/L). Severe hypoglycemia is an emergency. Do not wait to see if the symptoms will go away. Get medical help right away. Call your local emergency services (911 in the [place]  If you have severe hypoglycemia and you cannot eat or drink, you may need an injection of  glucagon. A family member or close friend should learn how to check your blood glucose and how to give you a glucagon injection. Ask your health care provider if you need to have an emergency glucagon injection kit available.  Severe hypoglycemia may need to be treated in a hospital. The treatment may include getting glucose through an IV. You may also need treatment for the cause of your hypoglycemia.  Follow these instructions at home:  Take diabetes medicines as told  · If your health care provider prescribed insulin or diabetes medicines, take them every day.  · Do not run out of insulin or other diabetes medicines that you take. Plan ahead so you always have these available.  · If you use insulin, adjust your dosage based on how physically active you are and what foods you eat. Your health care provider will tell you how to adjust your dosage.  Make healthy food choices    The things that you eat and drink affect your blood glucose and your insulin dosage. Making good choices helps to control your diabetes and prevent other health problems. A healthy meal plan includes eating lean proteins, complex carbohydrates, fresh fruits and vegetables, low-fat dairy products, and healthy fats.  Make an appointment to see a diet and nutrition specialist (registered dietitian) to help you create an eating plan that is right for you. Make sure that you:  · Follow instructions from your health care provider about eating or drinking restrictions.  · Drink enough fluid to keep your urine pale yellow.  · Keep a record of the carbohydrates that you eat. Do this by reading food labels and learning the standard serving sizes of foods.  · Follow your sick day plan whenever you cannot eat or drink as usual. Make this plan in advance with your health care provider.    Stay active  Exercise regularly, as told by your health care provider. This may include:  · Stretching and doing strength exercises, such as yoga or weightlifting, 2 or  more times a week.  · Doing 150 minutes or more of moderate-intensity or vigorous-intensity exercise each week. This could be brisk walking, biking, or water aerobics.  ? Spread out your activity over 3 or more days of the week.  ? Do not go more than 2 days in a row without doing some kind of physical activity.  When you start a new exercise or activity, work with your health care provider to adjust your insulin, medicines, or food intake as needed.  Make healthy lifestyle choices  · Do not use any tobacco products, such as cigarettes, chewing tobacco, and e-cigarettes. If you need help quitting, ask your health care provider.  · If your health care provider says that alcohol is safe for you, limit alcohol intake to no more than 1 drink per day for nonpregnant women and 2 drinks per day for men. One drink equals 12 oz of beer (355 mL), 5 oz of wine (148 mL), or 1½ oz of hard liquor (44 mL).  · Learn to manage stress. If you need help with this, ask your health care provider.  Care for your body    · Keep your immunizations up to date. In addition to getting vaccinations as told by your health care provider, it is recommended that you get vaccinated against the following illnesses:  ? The flu (influenza). Get a flu shot every year.  ? Pneumonia.  ? Hepatitis B.  · Schedule an eye exam soon after your diagnosis, and then one time every year after that.  · Check your skin and feet every day for cuts, bruises, redness, blisters, or sores. Schedule a foot exam with your health care provider once every year.  · Brush your teeth and gums two times a day, and floss one or more times a day. Visit your dentist one or more times every 6 months.  · Maintain a healthy weight.  General instructions  · Take over-the-counter and prescription medicines only as told by your health care provider.  · Share your diabetes management plan with people in your workplace, school, and household.  · Carry a medical alert card or wear medical  alert otis.  · Keep all follow-up visits as told by your health care provider. This is important.  Questions to ask your health care provider  · Do I need to meet with a diabetes educator?  · Where can I find a support group for people with diabetes?  Where to find more information  For more information about diabetes, visit:  · American Diabetes Association (ADA): www.diabetes.org  · American Association of Diabetes Educators (AADE): www.diabeteseducator.org  Summary  · Caring for yourself after you have been diagnosed with (type 2 diabetes mellitus) means keeping your blood sugar (glucose) under control with a balance of nutrition, exercise, lifestyle changes, and medicine.  · Check your blood glucose every day, as often as told by your health care provider.  · Having diabetes can put you at risk for other long-term (chronic) conditions, such as heart disease and kidney disease. Your health care provider may prescribe medicines to help prevent complications from diabetes.  · Keep all follow-up visits as told by your health care provider. This is important.  This information is not intended to replace advice given to you by your health care provider. Make sure you discuss any questions you have with your health care provider.  Document Released: 04/10/2017 Document Revised: 06/10/2019 Document Reviewed: 01/20/2017  [employer] Interactive Patient Education © 2020 [employer]      Diabetes Mellitus and Exercise  Exercising regularly is important for your overall health, especially when you have diabetes (diabetes mellitus). Exercising is not only about losing weight. It has many other health benefits, such as increasing muscle strength and bone density and reducing body fat and stress. This leads to improved fitness, flexibility, and endurance, all of which result in better overall health.  Exercise has additional benefits for people with diabetes, including:  · Reducing appetite.  · Helping to lower and control  blood glucose.  · Lowering blood pressure.  · Helping to control amounts of fatty substances (lipids) in the blood, such as cholesterol and triglycerides.  · Helping the body to respond better to insulin (improving insulin sensitivity).  · Reducing how much insulin the body needs.  · Decreasing the risk for heart disease by:  ? Lowering cholesterol and triglyceride levels.  ? Increasing the levels of good cholesterol.  ? Lowering blood glucose levels.  What is my activity plan?  Your health care provider or certified diabetes educator can help you make a plan for the type and frequency of exercise (activity plan) that works for you. Make sure that you:  · Do at least 150 minutes of moderate-intensity or vigorous-intensity exercise each week. This could be brisk walking, biking, or water aerobics.  ? Do stretching and strength exercises, such as yoga or weightlifting, at least 2 times a week.  ? Spread out your activity over at least 3 days of the week.  · Get some form of physical activity every day.  ? Do not go more than 2 days in a row without some kind of physical activity.  ? Avoid being inactive for more than 30 minutes at a time. Take frequent breaks to walk or stretch.  · Choose a type of exercise or activity that you enjoy, and set realistic goals.  · Start slowly, and gradually increase the intensity of your exercise over time.  What do I need to know about managing my diabetes?    · Check your blood glucose before and after exercising.  ? If your blood glucose is 240 mg/dL (13.3 mmol/L) or higher before you exercise, check your urine for ketones. If you have ketones in your urine, do not exercise until your blood glucose returns to normal.  ? If your blood glucose is 100 mg/dL (5.6 mmol/L) or lower, eat a snack containing 15-20 grams of carbohydrate. Check your blood glucose 15 minutes after the snack to make sure that your level is above 100 mg/dL (5.6 mmol/L) before you start your exercise.  · Know the  symptoms of low blood glucose (hypoglycemia) and how to treat it. Your risk for hypoglycemia increases during and after exercise. Common symptoms of hypoglycemia can include:  ? Hunger.  ? Anxiety.  ? Sweating and feeling clammy.  ? Confusion.  ? Dizziness or feeling light-headed.  ? Increased heart rate or palpitations.  ? Blurry vision.  ? Tingling or numbness around the mouth, lips, or tongue.  ? Tremors or shakes.  ? Irritability.  · Keep a rapid-acting carbohydrate snack available before, during, and after exercise to help prevent or treat hypoglycemia.  · Avoid injecting insulin into areas of the body that are going to be exercised. For example, avoid injecting insulin into:  ? The arms, when playing tennis.  ? The legs, when jogging.  · Keep records of your exercise habits. Doing this can help you and your health care provider adjust your diabetes management plan as needed. Write down:  ? Food that you eat before and after you exercise.  ? Blood glucose levels before and after you exercise.  ? The type and amount of exercise you have done.  ? When your insulin is expected to peak, if you use insulin. Avoid exercising at times when your insulin is peaking.  · When you start a new exercise or activity, work with your health care provider to make sure the activity is safe for you, and to adjust your insulin, medicines, or food intake as needed.  · Drink plenty of water while you exercise to prevent dehydration or heat stroke. Drink enough fluid to keep your urine clear or pale yellow.  Summary  · Exercising regularly is important for your overall health, especially when you have diabetes (diabetes mellitus).  · Exercising has many health benefits, such as increasing muscle strength and bone density and reducing body fat and stress.  · Your health care provider or certified diabetes educator can help you make a plan for the type and frequency of exercise (activity plan) that works for you.  · When you start a new  exercise or activity, work with your health care provider to make sure the activity is safe for you, and to adjust your insulin, medicines, or food intake as needed.  This information is not intended to replace advice given to you by your health care provider. Make sure you discuss any questions you have with your health care provider.  Document Released: 03/09/2005 Document Revised: 01/13/2020 Document Reviewed: 05/29/2017  [employer] Interactive Patient Education © 2020 [employer]      Diabetes Mellitus and Nutrition, Adult  When you have diabetes (diabetes mellitus), it is very important to have healthy eating habits because your blood sugar (glucose) levels are greatly affected by what you eat and drink. Eating healthy foods in the appropriate amounts, at about the same times every day, can help you:  · Control your blood glucose.  · Lower your risk of heart disease.  · Improve your blood pressure.  · Reach or maintain a healthy weight.  Every person with diabetes is different, and each person has different needs for a meal plan. Your health care provider may recommend that you work with a diet and nutrition specialist (dietitian) to make a meal plan that is best for you. Your meal plan may vary depending on factors such as:  · The calories you need.  · The medicines you take.  · Your weight.  · Your blood glucose, blood pressure, and cholesterol levels.  · Your activity level.  · Other health conditions you have, such as heart or kidney disease.  How do carbohydrates affect me?  Carbohydrates, also called carbs, affect your blood glucose level more than any other type of food. Eating carbs naturally raises the amount of glucose in your blood. Carb counting is a method for keeping track of how many carbs you eat. Counting carbs is important to keep your blood glucose at a healthy level, especially if you use insulin or take certain oral diabetes medicines.  It is important to know how many carbs you can safely  "have in each meal. This is different for every person. Your dietitian can help you calculate how many carbs you should have at each meal and for each snack.  Foods that contain carbs include:  · Bread, cereal, rice, pasta, and crackers.  · Potatoes and corn.  · Peas, beans, and lentils.  · Milk and yogurt.  · Fruit and juice.  · Desserts, such as cakes, cookies, ice cream, and candy.  How does alcohol affect me?  Alcohol can cause a sudden decrease in blood glucose (hypoglycemia), especially if you use insulin or take certain oral diabetes medicines. Hypoglycemia can be a life-threatening condition. Symptoms of hypoglycemia (sleepiness, dizziness, and confusion) are similar to symptoms of having too much alcohol.  If your health care provider says that alcohol is safe for you, follow these guidelines:  · Limit alcohol intake to no more than 1 drink per day for nonpregnant women and 2 drinks per day for men. One drink equals 12 oz of beer, 5 oz of wine, or 1½ oz of hard liquor.  · Do not drink on an empty stomach.  · Keep yourself hydrated with water, diet soda, or unsweetened iced tea.  · Keep in mind that regular soda, juice, and other mixers may contain a lot of sugar and must be counted as carbs.  What are tips for following this plan?    Reading food labels  · Start by checking the serving size on the \"Nutrition Facts\" label of packaged foods and drinks. The amount of calories, carbs, fats, and other nutrients listed on the label is based on one serving of the item. Many items contain more than one serving per package.  · Check the total grams (g) of carbs in one serving. You can calculate the number of servings of carbs in one serving by dividing the total carbs by 15. For example, if a food has 30 g of total carbs, it would be equal to 2 servings of carbs.  · Check the number of grams (g) of saturated and trans fats in one serving. Choose foods that have low or no amount of these fats.  · Check the number of " "milligrams (mg) of salt (sodium) in one serving. Most people should limit total sodium intake to less than 2,300 mg per day.  · Always check the nutrition information of foods labeled as \"low-fat\" or \"nonfat\". These foods may be higher in added sugar or refined carbs and should be avoided.  · Talk to your dietitian to identify your daily goals for nutrients listed on the label.  Shopping  · Avoid buying canned, premade, or processed foods. These foods tend to be high in fat, sodium, and added sugar.  · Shop around the outside edge of the grocery store. This includes fresh fruits and vegetables, bulk grains, fresh meats, and fresh dairy.  Cooking  · Use low-heat cooking methods, such as baking, instead of high-heat cooking methods like deep frying.  · Cook using healthy oils, such as olive, canola, or sunflower oil.  · Avoid cooking with butter, cream, or high-fat meats.  Meal planning  · Eat meals and snacks regularly, preferably at the same times every day. Avoid going long periods of time without eating.  · Eat foods high in fiber, such as fresh fruits, vegetables, beans, and whole grains. Talk to your dietitian about how many servings of carbs you can eat at each meal.  · Eat 4-6 ounces (oz) of lean protein each day, such as lean meat, chicken, fish, eggs, or tofu. One oz of lean protein is equal to:  ? 1 oz of meat, chicken, or fish.  ? 1 egg.  ? ¼ cup of tofu.  · Eat some foods each day that contain healthy fats, such as avocado, nuts, seeds, and fish.  Lifestyle  · Check your blood glucose regularly.  · Exercise regularly as told by your health care provider. This may include:  ? 150 minutes of moderate-intensity or vigorous-intensity exercise each week. This could be brisk walking, biking, or water aerobics.  ? Stretching and doing strength exercises, such as yoga or weightlifting, at least 2 times a week.  · Take medicines as told by your health care provider.  · Do not use any products that contain nicotine " or tobacco, such as cigarettes and e-cigarettes. If you need help quitting, ask your health care provider.  · Work with a counselor or diabetes educator to identify strategies to manage stress and any emotional and social challenges.  Questions to ask a health care provider  · Do I need to meet with a diabetes educator?  · Do I need to meet with a dietitian?  · What number can I call if I have questions?  · When are the best times to check my blood glucose?  Where to find more information:  · American Diabetes Association: diabetes.org  · Academy of Nutrition and Dietetics: www.eatright.org  · National [place] of Diabetes and Digestive and Kidney Diseases (NIH): www.niddk.nih.gov  Summary  · A healthy meal plan will help you control your blood glucose and maintain a healthy lifestyle.  · Working with a diet and nutrition specialist (dietitian) can help you make a meal plan that is best for you.  · Keep in mind that carbohydrates (carbs) and alcohol have immediate effects on your blood glucose levels. It is important to count carbs and to use alcohol carefully.  This information is not intended to replace advice given to you by your health care provider. Make sure you discuss any questions you have with your health care provider.  Document Released: 09/14/2006 Document Revised: 01/14/2020 Document Reviewed: 01/22/2018  [employer] Interactive Patient Education © 2020 [employer]

## 2020-04-28 NOTE — PROGRESS NOTES
Subjective   Brenda Garzon is a 79 y.o. female.     Telephone visit conducted with the patient today for her routine follow up on chronic medical problems.  BP has been well controlled in the office.  Reports her blood sugars have been fairly well controlled.  No recent episodes of hypoglycemia.  She is doing well on her statin therapy for cholesterol management without side effects.  She reports that her arthritis symptoms have been fairly well controlled on the Tylenol Arthritis that she is currently taking.  She reports that she has been seeing nephrology.  He did notice some asymptomatic microscopic hematuria.  She has been referred to urology in [place].  She reports that she needs to make a follow up appointment with urology as she has an upcoming appointment with nephrology in July.  She is without any new complaints today.    Hypertension   This is a chronic problem. The current episode started more than 1 year ago. The problem has been resolved since onset. The problem is controlled. Pertinent negatives include no anxiety or sweats. There are no associated agents to hypertension. Risk factors for coronary artery disease include post-menopausal state, diabetes mellitus, dyslipidemia, family history, sedentary lifestyle and stress. Past treatments include angiotensin blockers. Current antihypertension treatment includes angiotensin blockers. The current treatment provides significant improvement. Compliance problems include diet and exercise.  Hypertensive end-organ damage includes kidney disease. Identifiable causes of hypertension include chronic renal disease.   Diabetes   She presents for her follow-up diabetic visit. She has type 2 diabetes mellitus. Her disease course has been stable. There are no hypoglycemic associated symptoms. Pertinent negatives for hypoglycemia include no sweats. Associated symptoms include fatigue. There are no hypoglycemic complications. Symptoms are stable. There are no  diabetic complications. Risk factors for coronary artery disease include diabetes mellitus, dyslipidemia, family history, post-menopausal, sedentary lifestyle and stress. Current diabetic treatment includes oral agent (triple therapy) and insulin injections. She is compliant with treatment most of the time. Her weight is stable. She is following a generally healthy diet. She has not had a previous visit with a dietitian. She rarely participates in exercise. An ACE inhibitor/angiotensin II receptor blocker is being taken. She does not see a podiatrist.Eye exam is not current.   Arthritis   Presents for follow-up visit. She complains of pain and joint warmth. The symptoms have been stable. Associated symptoms include fatigue, pain at night and pain while resting. Pertinent negatives include no dry eyes, dysuria, Raynaud's syndrome or uveitis. Compliance with total regimen is %. Compliance with medications is %.        The following portions of the patient's history were reviewed and updated as appropriate: allergies, current medications, past family history, past medical history, past social history, past surgical history and problem list.    Review of Systems   Constitutional: Positive for fatigue.   HENT: Negative.    Eyes: Negative.    Respiratory: Negative.    Cardiovascular: Negative.    Gastrointestinal: Negative.  Positive for indigestion.   Genitourinary: Negative for dysuria.   Musculoskeletal: Positive for arthritis.   Skin: Negative.    Allergic/Immunologic: Negative.    Neurological: Negative.    Hematological: Negative.    Psychiatric/Behavioral: Negative.        Objective   Physical Exam   Constitutional: She is oriented to person, place, and time.   Pulmonary/Chest: Effort normal.   Neurological: She is alert and oriented to person, place, and time.   Psychiatric: She has a normal mood and affect. Her behavior is normal. Judgment and thought content normal.         Assessment/Plan   Brenda was  seen today for hypertension, diabetes and heartburn.    Diagnoses and all orders for this visit:    Essential hypertension    Hypertension associated with diabetes (CMS/[facility])    Vitamin D deficiency    Gastroesophageal reflux disease without esophagitis    Type 2 diabetes mellitus with hypoglycemia without coma, with long-term current use of insulin (CMS/[facility])    Multinodular goiter    Dyslipidemia    Hiatal hernia    Decreased GFR    Asymptomatic microscopic hematuria               Patient's Body mass index is 20.4 kg/m². BMI is within normal parameters. No follow-up required..    This visit has been rescheduled as a phone visit to comply with patient safety concerns in accordance with CDC recommendations. Total time of discussion was 7 minutes.      Continue current medications.  Continued follow up with specialists as scheduled.   Follow up in 3 months for routine follow up and repeat labs.  Follow up sooner for problems/concerns.  Patient verbalized understanding and agreement with plan of care.        This document has been electronically signed by CHEYENNE Lerma on April 28, 2020 13:40

## 2020-05-12 ENCOUNTER — TELEPHONE (OUTPATIENT)
Dept: FAMILY MEDICINE CLINIC | Facility: CLINIC | Age: 80
End: 2020-05-12

## 2020-05-12 NOTE — TELEPHONE ENCOUNTER
Patients daughter called in stating that her script for Trulicity needs to be faxed to [phone] ([facility]). She also needs the script til December 31 so it can be automatically filled.     They have a program that helps pay for her meds.

## 2020-06-12 DIAGNOSIS — I10 ESSENTIAL HYPERTENSION: Chronic | ICD-10-CM

## 2020-07-01 ENCOUNTER — OFFICE VISIT (OUTPATIENT)
Dept: FAMILY MEDICINE CLINIC | Facility: CLINIC | Age: 80
End: 2020-07-01

## 2020-07-01 VITALS
HEIGHT: 59 IN | RESPIRATION RATE: 20 BRPM | WEIGHT: 95.4 LBS | OXYGEN SATURATION: 98 % | TEMPERATURE: 98.8 F | DIASTOLIC BLOOD PRESSURE: 70 MMHG | HEART RATE: 74 BPM | BODY MASS INDEX: 19.23 KG/M2 | SYSTOLIC BLOOD PRESSURE: 110 MMHG

## 2020-07-01 DIAGNOSIS — Z00.00 MEDICARE ANNUAL WELLNESS VISIT, SUBSEQUENT: Primary | ICD-10-CM

## 2020-07-01 DIAGNOSIS — Z11.59 NEED FOR HEPATITIS C SCREENING TEST: ICD-10-CM

## 2020-07-01 PROCEDURE — G0439 PPPS, SUBSEQ VISIT: HCPCS | Performed by: NURSE PRACTITIONER

## 2020-07-01 NOTE — PROGRESS NOTES
The ABCs of the Annual Wellness Visit  Subsequent Medicare Wellness Visit    Chief Complaint   Patient presents with   • Annual Exam     Medicare Wellness       Subjective   History of Present Illness:  Brenda Garzon is a 79 y.o. female who presents for a Subsequent Medicare Wellness Visit.    HEALTH RISK ASSESSMENT    Recent Hospitalizations:  No hospitalization(s) within the last year.    Current Medical Providers:  Patient Care Team:  Jodie Aguilera APRN as PCP - General  Edgar Arteaga MD as Consulting Physician (Endocrinology)  Cinthia Garrido MA (Inactive) as Medical Assistant    Smoking Status:  Social History     Tobacco Use   Smoking Status Never Smoker   Smokeless Tobacco Never Used       Alcohol Consumption:  Social History     Substance and Sexual Activity   Alcohol Use No       Depression Screen:   PHQ-2/PHQ-9 Depression Screening 7/1/2020   Little interest or pleasure in doing things 0   Feeling down, depressed, or hopeless 0   Total Score 0       Fall Risk Screen:  MORAIMAADI Fall Risk Assessment was completed, and patient is at LOW risk for falls.Assessment completed on:7/1/2020    Health Habits and Functional and Cognitive Screening:  Functional & Cognitive Status 7/1/2020   Do you have difficulty preparing food and eating? No   Do you have difficulty bathing yourself, getting dressed or grooming yourself? No   Do you have difficulty using the toilet? No   Do you have difficulty moving around from place to place? No   Do you have trouble with steps or getting out of a bed or a chair? No   Current Diet Well Balanced Diet   Dental Exam -   Eye Exam -   Exercise (times per week) -   Current Exercise Activities Include Walking   Do you need help using the phone?  No   Are you deaf or do you have serious difficulty hearing?  No   Do you need help with transportation? No   Do you need help shopping? No   Do you need help preparing meals?  No   Do you need help with housework?  No   Do  you need help with laundry? No   Do you need help taking your medications? No   Do you need help managing money? No   Do you ever drive or ride in a car without wearing a seat belt? No   Have you felt unusual stress, anger or loneliness in the last month? -   Who do you live with? -   If you need help, do you have trouble finding someone available to you? -   Have you been bothered in the last four weeks by sexual problems? -   Do you have difficulty concentrating, remembering or making decisions? -         Does the patient have evidence of cognitive impairment? No    Asprin use counseling:Taking ASA appropriately as indicated    Age-appropriate Screening Schedule:  Refer to the list below for future screening recommendations based on patient's age, sex and/or medical conditions. Orders for these recommended tests are listed in the plan section. The patient has been provided with a written plan.    Health Maintenance   Topic Date Due   • DIABETIC EYE EXAM  04/30/2019   • LIPID PANEL  06/28/2020   • URINE MICROALBUMIN  06/28/2020   • ZOSTER VACCINE (2 of 2) 01/01/2099 (Originally 10/10/2016)   • HEMOGLOBIN A1C  07/30/2020   • INFLUENZA VACCINE  08/01/2020   • DXA SCAN  08/24/2020   • MAMMOGRAM  11/19/2020   • COLONOSCOPY  11/08/2023   • TDAP/TD VACCINES (2 - Td) 08/15/2026          The following portions of the patient's history were reviewed and updated as appropriate: allergies, current medications, past family history, past medical history, past social history, past surgical history and problem list.    Outpatient Medications Prior to Visit   Medication Sig Dispense Refill   • ACCU-CHEK FASTCLIX LANCETS misc USE AS DIRECTED 200 each 5   • acetaminophen (TYLENOL 8 HOUR ARTHRITIS PAIN) 650 MG 8 hr tablet Take 2 tablets by mouth Every 8 (Eight) Hours As Needed for Mild Pain . 120 tablet 5   • aspirin 81 MG tablet Take 81 mg by mouth daily.     • atorvastatin (LIPITOR) 20 MG tablet TAKE 1 TABLET EVERY NIGHT 90 tablet  4   • butalbital-acetaminophen-caffeine (ESGIC) -40 MG per tablet Take 1 tablet by mouth Every 4 (Four) Hours As Needed for Headache. 30 tablet 0   • Dulaglutide 1.5 MG/0.5ML solution pen-injector Inject 1.5 mg under the skin into the appropriate area as directed 1 (One) Time Per Week. 12 pen 3   • famotidine (PEPCID) 20 MG tablet Take 1 tablet by mouth 2 (Two) Times a Day. 60 tablet 5   • Glucose 15 GM/32ML gel Take 32 mL by mouth 1 (One) Time As Needed (hypoglycemia). 96 mL 11   • glucose blood (ACCU-CHEK YUSRA PLUS) test strip 1 each by Other route See Admin Instructions. Use as instructed 200 each 5   • Insulin Glargine, 1 Unit Dial, (TOUJEO) 300 UNIT/ML solution pen-injector injection Inject 15 Units under the skin into the appropriate area as directed every night at bedtime. 3 pen 1   • Insulin Pen Needle (PEN NEEDLES) 32G X 4 MM misc 1 each Daily. 200 each 4   • losartan (COZAAR) 50 MG tablet Take 1 tablet by mouth 2 (Two) Times a Day. 180 tablet 1   • meclizine (ANTIVERT) 25 MG tablet Take 0.5-1 tablets by mouth 3 (Three) Times a Day As Needed for dizziness. 90 tablet 0   • metFORMIN ER (GLUCOPHAGE XR) 500 MG 24 hr tablet 1 with bkfast and 1 w supper 180 tablet 1   • sucralfate (CARAFATE) 1 g tablet Take 1 tablet by mouth 4 (Four) Times a Day Before Meals & at Bedtime As Needed (acid reflux). 120 tablet 2   • tiZANidine (ZANAFLEX) 4 MG tablet Take 0.5-1 tablets by mouth At Night As Needed for Muscle Spasms. 30 tablet 5   • verapamil SR (CALAN-SR) 120 MG CR tablet Take 1 tablet by mouth Every Night. 90 tablet 1   • vitamin D (ERGOCALCIFEROL) 1.25 MG (97130 UT) capsule capsule Take 1 capsule by mouth Every 7 (Seven) Days. 5 capsule 5     No facility-administered medications prior to visit.        Patient Active Problem List   Diagnosis   • Essential hypertension   • Vitamin D deficiency   • Type 2 diabetes mellitus, uncontrolled (CMS/HCC)   • Thyroid nodule   • Colon polyp   • Postmenopausal state   •  "Dyslipidemia   • Type 2 diabetes mellitus with hypoglycemia without coma, with long-term current use of insulin (CMS/[facility])   • Hypertension associated with diabetes (CMS/[facility])   • Mixed diabetic hyperlipidemia associated with type 2 diabetes mellitus (CMS/[facility])   • Urinary tract infectious disease   • Type 2 diabetes mellitus (CMS/HCC)   • Neurologic disorder associated with diabetes mellitus (CMS/[facility])   • Neck pain   • Multinodular goiter   • Irregular heartbeat   • Hypertensive disorder   • Hyperlipidemia   • Hiatal hernia   • GERD (gastroesophageal reflux disease)   • Chest pain   • Decreased GFR   • Asymptomatic microscopic hematuria       Advanced Care Planning:  ACP discussion was held with the patient during this visit. Patient does not have an advance directive, information provided.    Review of Systems    Compared to one year ago, the patient feels her physical health is the same.  Compared to one year ago, the patient feels her mental health is the same.    Reviewed chart for potential of high risk medication in the elderly: yes  Reviewed chart for potential of harmful drug interactions in the elderly:yes    Objective         Vitals:    07/01/20 1312   BP: 110/70   BP Location: Left arm   Patient Position: Sitting   Cuff Size: Adult   Pulse: 74   Resp: 20   Temp: 98.8 °F (37.1 °C)   TempSrc: Temporal   SpO2: 98%   Weight: 43.3 kg (95 lb 6.4 oz)   Height: 149.9 cm (59\")   PainSc: 0-No pain       Body mass index is 19.27 kg/m².  Discussed the patient's BMI with her. The BMI is in the acceptable range.    Physical Exam          Assessment/Plan   Medicare Risks and Personalized Health Plan  CMS Preventative Services Quick Reference  Advance Directive Discussion  Cardiovascular risk  Colon Cancer Screening  Dementia/Memory   Depression/Dysphoria  Fall Risk  Osteoprorosis Risk  Polypharmacy    The above risks/problems have been discussed with the patient.  Pertinent information has been shared with the patient in " the After Visit Summary.  Follow up plans and orders are seen below in the Assessment/Plan Section.    Diagnoses and all orders for this visit:    1. Medicare annual wellness visit, subsequent (Primary)    2. Need for hepatitis C screening test  -     Hepatitis C antibody; Future      Follow Up:  Return in about 1 year (around 7/1/2021) for Medicare Wellness.   An action plan was developed by the patient and reviewed in the office.  See attached documents.  An After Visit Summary and PPPS were given to the patient.        This document has been electronically signed by CHEYENNE Lerma on July 1, 2020 13:41

## 2020-07-01 NOTE — PATIENT INSTRUCTIONS
Medicare Wellness  Personal Prevention Plan of Service     Date of Office Visit:  2020  Encounter Provider:  CHEYENNE Lerma  Place of Service:  [facility]  Patient Name: Brenda Garzon  :  1940    As part of the Medicare Wellness portion of your visit today, we are providing you with this personalized preventive plan of services (PPPS). This plan is based upon recommendations of the [place] Preventive Services Task Force (USPSTF) and the Advisory Committee on Immunization Practices (ACIP).    This lists the preventive care services that should be considered, and provides dates of when you are due. Items listed as completed are up-to-date and do not require any further intervention.    Health Maintenance   Topic Date Due   • HEPATITIS C SCREENING  08/15/2016   • DIABETIC EYE EXAM  2019   • MEDICARE ANNUAL WELLNESS  2020   • LIPID PANEL  2020   • URINE MICROALBUMIN  2020   • ZOSTER VACCINE (2 of 2) 2099 (Originally 10/10/2016)   • HEMOGLOBIN A1C  2020   • INFLUENZA VACCINE  2020   • DXA SCAN  2020   • MAMMOGRAM  2020   • COLONOSCOPY  2023   • TDAP/TD VACCINES (2 - Td) 08/15/2026   • Pneumococcal Vaccine Once at 65 Years Old  Completed       No orders of the defined types were placed in this encounter.      Return in about 1 year (around 2021) for Medicare Wellness.

## 2020-07-28 ENCOUNTER — LAB (OUTPATIENT)
Dept: LAB | Facility: HOSPITAL | Age: 80
End: 2020-07-28

## 2020-07-28 ENCOUNTER — OFFICE VISIT (OUTPATIENT)
Dept: FAMILY MEDICINE CLINIC | Facility: CLINIC | Age: 80
End: 2020-07-28

## 2020-07-28 VITALS
DIASTOLIC BLOOD PRESSURE: 60 MMHG | RESPIRATION RATE: 20 BRPM | HEIGHT: 59 IN | SYSTOLIC BLOOD PRESSURE: 110 MMHG | TEMPERATURE: 95.5 F | HEART RATE: 79 BPM | BODY MASS INDEX: 20.52 KG/M2 | OXYGEN SATURATION: 98 % | WEIGHT: 101.8 LBS

## 2020-07-28 DIAGNOSIS — R22.9 MASS OF SKIN: ICD-10-CM

## 2020-07-28 DIAGNOSIS — E04.1 THYROID NODULE: Chronic | ICD-10-CM

## 2020-07-28 DIAGNOSIS — I10 ESSENTIAL HYPERTENSION: Primary | Chronic | ICD-10-CM

## 2020-07-28 DIAGNOSIS — R23.8 CHANGE OF SKIN COLOR: ICD-10-CM

## 2020-07-28 DIAGNOSIS — E11.65 UNCONTROLLED TYPE 2 DIABETES MELLITUS WITH HYPERGLYCEMIA (HCC): Chronic | ICD-10-CM

## 2020-07-28 DIAGNOSIS — Z11.59 NEED FOR HEPATITIS C SCREENING TEST: ICD-10-CM

## 2020-07-28 PROCEDURE — 81001 URINALYSIS AUTO W/SCOPE: CPT | Performed by: NURSE PRACTITIONER

## 2020-07-28 PROCEDURE — 86803 HEPATITIS C AB TEST: CPT

## 2020-07-28 PROCEDURE — 84443 ASSAY THYROID STIM HORMONE: CPT | Performed by: NURSE PRACTITIONER

## 2020-07-28 PROCEDURE — 80053 COMPREHEN METABOLIC PANEL: CPT | Performed by: NURSE PRACTITIONER

## 2020-07-28 PROCEDURE — 83036 HEMOGLOBIN GLYCOSYLATED A1C: CPT | Performed by: NURSE PRACTITIONER

## 2020-07-28 PROCEDURE — 99214 OFFICE O/P EST MOD 30 MIN: CPT | Performed by: NURSE PRACTITIONER

## 2020-07-30 LAB
ALBUMIN SERPL-MCNC: 4.2 G/DL (ref 3.5–5.2)
ALBUMIN/GLOB SERPL: 1.4 G/DL
ALP SERPL-CCNC: 81 U/L (ref 39–117)
ALT SERPL W P-5'-P-CCNC: 16 U/L (ref 1–33)
ANION GAP SERPL CALCULATED.3IONS-SCNC: 10.8 MMOL/L (ref 5–15)
AST SERPL-CCNC: 29 U/L (ref 1–32)
BACTERIA UR QL AUTO: NORMAL /HPF
BILIRUB SERPL-MCNC: 0.4 MG/DL (ref 0–1.2)
BILIRUB UR QL STRIP: NEGATIVE
BUN SERPL-MCNC: 20 MG/DL (ref 8–23)
BUN/CREAT SERPL: 19.6 (ref 7–25)
CALCIUM SPEC-SCNC: 10.2 MG/DL (ref 8.6–10.5)
CHLORIDE SERPL-SCNC: 100 MMOL/L (ref 98–107)
CLARITY UR: CLEAR
CO2 SERPL-SCNC: 24.2 MMOL/L (ref 22–29)
COLOR UR: YELLOW
CREAT SERPL-MCNC: 1.02 MG/DL (ref 0.57–1)
GFR SERPL CREATININE-BSD FRML MDRD: 52 ML/MIN/1.73
GLOBULIN UR ELPH-MCNC: 3.1 GM/DL
GLUCOSE SERPL-MCNC: 73 MG/DL (ref 65–99)
GLUCOSE UR STRIP-MCNC: NEGATIVE MG/DL
HBA1C MFR BLD: 7.05 % (ref 4.8–5.6)
HCV AB SER DONR QL: NORMAL
HGB UR QL STRIP.AUTO: NEGATIVE
HYALINE CASTS UR QL AUTO: NORMAL /LPF
KETONES UR QL STRIP: NEGATIVE
LEUKOCYTE ESTERASE UR QL STRIP.AUTO: ABNORMAL
NITRITE UR QL STRIP: NEGATIVE
PH UR STRIP.AUTO: 6.5 [PH] (ref 5–8)
POTASSIUM SERPL-SCNC: 4.6 MMOL/L (ref 3.5–5.2)
PROT SERPL-MCNC: 7.3 G/DL (ref 6–8.5)
PROT UR QL STRIP: NEGATIVE
RBC # UR: NORMAL /HPF
REF LAB TEST METHOD: NORMAL
SODIUM SERPL-SCNC: 135 MMOL/L (ref 136–145)
SP GR UR STRIP: 1.01 (ref 1–1.03)
SQUAMOUS #/AREA URNS HPF: NORMAL /HPF
TSH SERPL DL<=0.05 MIU/L-ACNC: 1.07 UIU/ML (ref 0.27–4.2)
UROBILINOGEN UR QL STRIP: ABNORMAL
WBC UR QL AUTO: NORMAL /HPF

## 2020-08-04 ENCOUNTER — TELEPHONE (OUTPATIENT)
Dept: FAMILY MEDICINE CLINIC | Facility: CLINIC | Age: 80
End: 2020-08-04

## 2020-08-04 NOTE — TELEPHONE ENCOUNTER
----- Message from CHEYENNE Lerma sent at 7/31/2020  4:18 PM CDT -----  Her A1C is 7.05 which is just a bit higher than I would like to see it, but I think her medication dosage is sufficient.  I don't think we need to make any changes.  Kidney function has remained about the same. She needs to continue seeing nephrology.

## 2020-08-05 ENCOUNTER — TELEPHONE (OUTPATIENT)
Dept: FAMILY MEDICINE CLINIC | Facility: CLINIC | Age: 80
End: 2020-08-05

## 2020-08-05 DIAGNOSIS — R22.9 MASS OF SKIN: ICD-10-CM

## 2020-08-05 DIAGNOSIS — E04.1 THYROID NODULE: Chronic | ICD-10-CM

## 2020-08-06 ENCOUNTER — TELEPHONE (OUTPATIENT)
Dept: FAMILY MEDICINE CLINIC | Facility: CLINIC | Age: 80
End: 2020-08-06

## 2020-08-06 NOTE — TELEPHONE ENCOUNTER
----- Message from CHEYENNE Lerma sent at 8/5/2020  9:02 PM CDT -----  The area of concern on her buttock appears to be a calcified area of fat.  This could be from a previous IM injection given in the buttock in that location.

## 2020-08-16 NOTE — PROGRESS NOTES
Subjective   Brenda Garzon is a 79 y.o. female.     She presents today for her routine follow up on chronic medical problems.  BP is well controlled today in the office.  Reports her blood sugars have been fairly well controlled.  No recent episodes of hypoglycemia.  She is doing well on her statin therapy for cholesterol management without side effects.  She reports that her arthritis symptoms have been fairly well controlled on the Tylenol Arthritis that she is currently taking.  She reports that she has been seeing nephrology.  He did notice some asymptomatic microscopic hematuria.  She has been referred to urology in [place].  She reports that her heartburn symptoms are fairly well controlled.  She is due for repeat labs.  She is due for repeat ultrasound of her thyroid.  She also has concerns with some skin discoloration on her right buttock.  She is otherwise without any other new complaints.    Hypertension   This is a chronic problem. The current episode started more than 1 year ago. The problem has been resolved since onset. The problem is controlled. Pertinent negatives include no anxiety or sweats. There are no associated agents to hypertension. Risk factors for coronary artery disease include post-menopausal state, diabetes mellitus, dyslipidemia, family history, sedentary lifestyle and stress. Past treatments include angiotensin blockers. Current antihypertension treatment includes angiotensin blockers. The current treatment provides significant improvement. Compliance problems include diet and exercise.  Hypertensive end-organ damage includes kidney disease. Identifiable causes of hypertension include chronic renal disease.   Diabetes   She presents for her follow-up diabetic visit. She has type 2 diabetes mellitus. Her disease course has been stable. There are no hypoglycemic associated symptoms. Pertinent negatives for hypoglycemia include no sweats. Associated symptoms include fatigue. There are no  hypoglycemic complications. Symptoms are stable. There are no diabetic complications. Risk factors for coronary artery disease include diabetes mellitus, dyslipidemia, family history, post-menopausal, sedentary lifestyle and stress. Current diabetic treatment includes oral agent (triple therapy) and insulin injections. She is compliant with treatment most of the time. Her weight is stable. She is following a generally healthy diet. She has not had a previous visit with a dietitian. She rarely participates in exercise. An ACE inhibitor/angiotensin II receptor blocker is being taken. She does not see a podiatrist.Eye exam is not current.   Arthritis   Presents for follow-up visit. She complains of pain and joint warmth. The symptoms have been stable. Associated symptoms include fatigue, pain at night and pain while resting. Pertinent negatives include no dry eyes, dysuria, Raynaud's syndrome or uveitis. Compliance with total regimen is %. Compliance with medications is %.        The following portions of the patient's history were reviewed and updated as appropriate: allergies, current medications, past family history, past medical history, past social history, past surgical history and problem list.    Review of Systems   Constitutional: Positive for fatigue.   HENT: Negative.    Eyes: Negative.    Respiratory: Negative.    Cardiovascular: Negative.    Gastrointestinal: Negative.  Positive for indigestion.   Genitourinary: Negative for dysuria.   Musculoskeletal: Positive for arthritis.   Skin: Positive for color change.   Allergic/Immunologic: Negative.    Neurological: Negative.    Hematological: Negative.    Psychiatric/Behavioral: Negative.        Objective   Physical Exam   Constitutional: She is oriented to person, place, and time. Vital signs are normal. She appears well-developed and well-nourished. No distress.   HENT:   Head: Normocephalic.   Right Ear: External ear normal.   Left Ear: External  ear normal.   Nose: Nose normal.   Mouth/Throat: Oropharynx is clear and moist. No oropharyngeal exudate.   Eyes: Pupils are equal, round, and reactive to light. Conjunctivae and EOM are normal. Right eye exhibits no discharge. Left eye exhibits no discharge.   Neck: Normal range of motion. Neck supple. No tracheal deviation present. No thyromegaly present.   Cardiovascular: Normal rate, regular rhythm and normal heart sounds. Exam reveals no gallop and no friction rub.   No murmur heard.  Pulmonary/Chest: Effort normal and breath sounds normal. No respiratory distress. She has no wheezes. She has no rales. She exhibits no tenderness.   Musculoskeletal: Normal range of motion.   Lymphadenopathy:     She has no cervical adenopathy.   Neurological: She is alert and oriented to person, place, and time.   Skin: Skin is warm and dry. Capillary refill takes less than 2 seconds. No rash noted. She is not diaphoretic. No erythema. No pallor.   Discoloration of the skin of the left buttock noted.   Psychiatric: She has a normal mood and affect. Her behavior is normal. Judgment and thought content normal.   Nursing note and vitals reviewed.        Assessment/Plan   Brenda was seen today for follow-up and hypertension.    Diagnoses and all orders for this visit:    Essential hypertension  -     Comprehensive Metabolic Panel  -     Hemoglobin A1c  -     TSH    Uncontrolled type 2 diabetes mellitus with hyperglycemia (CMS/HCC)  -     Comprehensive Metabolic Panel  -     Hemoglobin A1c  -     TSH    Thyroid nodule  -     Comprehensive Metabolic Panel  -     Hemoglobin A1c  -     TSH  -     US Thyroid; Future    Mass of skin  -     US Soft Tissue; Future    Change of skin color  -     Ambulatory Referral to Dermatology               Patient's Body mass index is 20.56 kg/m². BMI is within normal parameters. No follow-up required..    Schedule for ultrasound of the thyroid.  Schedule for ultrasound of the right buttock.  Referral to  dermatology for changes in skin color.  Lab following office visit.  Continue current medications.  Follow up in 3 months for routine follow up.  Follow up sooner for problems/concerns.  Patient verbalized understanding and agreement with plan of care.        This document has been electronically signed by CHEYENNE Lerma on August 16, 2020 14:58

## 2020-09-18 DIAGNOSIS — Z79.4 TYPE 2 DIABETES MELLITUS WITH HYPOGLYCEMIA WITHOUT COMA, WITH LONG-TERM CURRENT USE OF INSULIN (HCC): ICD-10-CM

## 2020-09-18 DIAGNOSIS — E11.649 TYPE 2 DIABETES MELLITUS WITH HYPOGLYCEMIA WITHOUT COMA, WITH LONG-TERM CURRENT USE OF INSULIN (HCC): ICD-10-CM

## 2020-09-23 DIAGNOSIS — Z79.4 TYPE 2 DIABETES MELLITUS WITH HYPOGLYCEMIA WITHOUT COMA, WITH LONG-TERM CURRENT USE OF INSULIN (HCC): ICD-10-CM

## 2020-09-23 DIAGNOSIS — E11.65 UNCONTROLLED TYPE 2 DIABETES MELLITUS WITH HYPERGLYCEMIA (HCC): Chronic | ICD-10-CM

## 2020-09-23 DIAGNOSIS — E11.649 TYPE 2 DIABETES MELLITUS WITH HYPOGLYCEMIA WITHOUT COMA, WITH LONG-TERM CURRENT USE OF INSULIN (HCC): ICD-10-CM

## 2020-09-23 NOTE — TELEPHONE ENCOUNTER
Caller: CASTILLO    Relationship: Child    Best call back number: [phone]    Medication needed:   Requested Prescriptions     Pending Prescriptions Disp Refills   • metFORMIN ER (Glucophage XR) 500 MG 24 hr tablet 180 tablet 1     Si with bkfast and 1 w supper   • Insulin Glargine, 1 Unit Dial, (TOUJEO) 300 UNIT/ML solution pen-injector injection 3 pen 1     Sig: Inject 15 Units under the skin into the appropriate area as directed every night at bedtime.       When do you need the refill by: ASAP     What details did the patient provide when requesting the medication: PATIENT HAS 6-7 DAYS LEFT, BUT PATIENT IS WANTING IT CALLED IN NOW BECAUSE IT TAKE A A WHILE FOR PHARMACY TO GET IT IN.     Does the patient have less than a 3 day supply:  [] Yes  [x] No    What is the patient's preferred pharmacy: [facility] - [place], [place] - [address] [facility] [address] - [phone] [facility] [phone] FX

## 2020-09-25 RX ORDER — METFORMIN HYDROCHLORIDE 500 MG/1
TABLET, EXTENDED RELEASE ORAL
Qty: 180 TABLET | Refills: 1 | Status: SHIPPED | OUTPATIENT
Start: 2020-09-25 | End: 2021-01-01 | Stop reason: SDUPTHER

## 2020-10-28 ENCOUNTER — TELEPHONE (OUTPATIENT)
Dept: FAMILY MEDICINE CLINIC | Facility: CLINIC | Age: 80
End: 2020-10-28

## 2020-10-28 NOTE — TELEPHONE ENCOUNTER
Tried calling to confirm appointment remind to wear a mask and prescreen also need to remind to come in fasting.  No answer no voicemail   HUB to read

## 2020-10-29 ENCOUNTER — LAB (OUTPATIENT)
Dept: LAB | Facility: HOSPITAL | Age: 80
End: 2020-10-29

## 2020-10-29 ENCOUNTER — OFFICE VISIT (OUTPATIENT)
Dept: FAMILY MEDICINE CLINIC | Facility: CLINIC | Age: 80
End: 2020-10-29

## 2020-10-29 VITALS
HEIGHT: 59 IN | OXYGEN SATURATION: 97 % | SYSTOLIC BLOOD PRESSURE: 112 MMHG | TEMPERATURE: 98.2 F | RESPIRATION RATE: 20 BRPM | HEART RATE: 88 BPM | DIASTOLIC BLOOD PRESSURE: 70 MMHG | WEIGHT: 107.3 LBS | BODY MASS INDEX: 21.63 KG/M2

## 2020-10-29 DIAGNOSIS — Z12.31 ENCOUNTER FOR SCREENING MAMMOGRAM FOR BREAST CANCER: ICD-10-CM

## 2020-10-29 DIAGNOSIS — Z13.820 SCREENING FOR OSTEOPOROSIS: ICD-10-CM

## 2020-10-29 DIAGNOSIS — Z13.29 SCREENING FOR THYROID DISORDER: ICD-10-CM

## 2020-10-29 DIAGNOSIS — Z23 NEEDS FLU SHOT: ICD-10-CM

## 2020-10-29 DIAGNOSIS — Z79.4 TYPE 2 DIABETES MELLITUS WITH HYPOGLYCEMIA WITHOUT COMA, WITH LONG-TERM CURRENT USE OF INSULIN (HCC): ICD-10-CM

## 2020-10-29 DIAGNOSIS — I10 ESSENTIAL HYPERTENSION: ICD-10-CM

## 2020-10-29 DIAGNOSIS — M94.9 DISORDER OF CARTILAGE, UNSPECIFIED: ICD-10-CM

## 2020-10-29 DIAGNOSIS — R20.0 NUMBNESS OF FINGERS OF BOTH HANDS: Primary | ICD-10-CM

## 2020-10-29 DIAGNOSIS — E55.9 VITAMIN D DEFICIENCY: ICD-10-CM

## 2020-10-29 DIAGNOSIS — E78.5 DYSLIPIDEMIA: ICD-10-CM

## 2020-10-29 DIAGNOSIS — R94.4 DECREASED GFR: ICD-10-CM

## 2020-10-29 DIAGNOSIS — E11.649 TYPE 2 DIABETES MELLITUS WITH HYPOGLYCEMIA WITHOUT COMA, WITH LONG-TERM CURRENT USE OF INSULIN (HCC): ICD-10-CM

## 2020-10-29 LAB
25(OH)D3 SERPL-MCNC: 28 NG/ML (ref 30–100)
ALBUMIN SERPL-MCNC: 4.3 G/DL (ref 3.5–5.2)
ALBUMIN UR-MCNC: 1.7 MG/DL
ALBUMIN/GLOB SERPL: 1.5 G/DL
ALP SERPL-CCNC: 92 U/L (ref 39–117)
ALT SERPL W P-5'-P-CCNC: 16 U/L (ref 1–33)
ANION GAP SERPL CALCULATED.3IONS-SCNC: 7.7 MMOL/L (ref 5–15)
AST SERPL-CCNC: 27 U/L (ref 1–32)
BASOPHILS # BLD AUTO: 0.06 10*3/MM3 (ref 0–0.2)
BASOPHILS NFR BLD AUTO: 0.8 % (ref 0–1.5)
BILIRUB SERPL-MCNC: 0.4 MG/DL (ref 0–1.2)
BUN SERPL-MCNC: 19 MG/DL (ref 8–23)
BUN/CREAT SERPL: 22.9 (ref 7–25)
CALCIUM SPEC-SCNC: 9.8 MG/DL (ref 8.6–10.5)
CHLORIDE SERPL-SCNC: 105 MMOL/L (ref 98–107)
CHOLEST SERPL-MCNC: 127 MG/DL (ref 0–200)
CO2 SERPL-SCNC: 27.3 MMOL/L (ref 22–29)
CREAT SERPL-MCNC: 0.83 MG/DL (ref 0.57–1)
CREAT UR-MCNC: 52.8 MG/DL
DEPRECATED RDW RBC AUTO: 43.1 FL (ref 37–54)
DIFFERENTIAL METHOD BLD: ABNORMAL
EOSINOPHIL # BLD AUTO: 0.13 10*3/MM3 (ref 0–0.4)
EOSINOPHIL NFR BLD AUTO: 1.7 % (ref 0.3–6.2)
ERYTHROCYTE [DISTWIDTH] IN BLOOD BY AUTOMATED COUNT: 14 % (ref 12.3–15.4)
GFR SERPL CREATININE-BSD FRML MDRD: 66 ML/MIN/1.73
GLOBULIN UR ELPH-MCNC: 2.9 GM/DL
GLUCOSE SERPL-MCNC: 61 MG/DL (ref 65–99)
HBA1C MFR BLD: 7.19 % (ref 4.8–5.6)
HCT VFR BLD AUTO: 36 % (ref 34–46.6)
HDLC SERPL-MCNC: 54 MG/DL (ref 40–60)
HGB BLD-MCNC: 11.9 G/DL (ref 12–15.9)
IMM GRANULOCYTES # BLD AUTO: 0.02 10*3/MM3 (ref 0–0.05)
IMM GRANULOCYTES NFR BLD AUTO: 0.3 % (ref 0–0.5)
LDLC SERPL CALC-MCNC: 60 MG/DL (ref 0–100)
LDLC/HDLC SERPL: 1.11 {RATIO}
LYMPHOCYTES # BLD AUTO: 2.08 10*3/MM3 (ref 0.7–3.1)
LYMPHOCYTES NFR BLD AUTO: 27.5 % (ref 19.6–45.3)
MCH RBC QN AUTO: 27.9 PG (ref 26.6–33)
MCHC RBC AUTO-ENTMCNC: 33.1 G/DL (ref 31.5–35.7)
MCV RBC AUTO: 84.3 FL (ref 79–97)
MICROALBUMIN/CREAT UR: 32.2 MG/G
MONOCYTES # BLD AUTO: 0.62 10*3/MM3 (ref 0.1–0.9)
MONOCYTES NFR BLD AUTO: 8.2 % (ref 5–12)
NEUTROPHILS NFR BLD AUTO: 4.64 10*3/MM3 (ref 1.7–7)
NEUTROPHILS NFR BLD AUTO: 61.5 % (ref 42.7–76)
NRBC BLD AUTO-RTO: 0 /100 WBC (ref 0–0.2)
PLATELET # BLD AUTO: 185 10*3/MM3 (ref 140–450)
PMV BLD AUTO: 13.6 FL (ref 6–12)
POTASSIUM SERPL-SCNC: 4.4 MMOL/L (ref 3.5–5.2)
PROT SERPL-MCNC: 7.2 G/DL (ref 6–8.5)
RBC # BLD AUTO: 4.27 10*6/MM3 (ref 3.77–5.28)
SODIUM SERPL-SCNC: 140 MMOL/L (ref 136–145)
TRIGL SERPL-MCNC: 64 MG/DL (ref 0–150)
TSH SERPL DL<=0.05 MIU/L-ACNC: 2.03 UIU/ML (ref 0.27–4.2)
VLDLC SERPL-MCNC: 13 MG/DL (ref 5–40)
WBC # BLD AUTO: 7.55 10*3/MM3 (ref 3.4–10.8)
WBC # BLD AUTO: 7.55 10*3/MM3 (ref 3.4–10.8)

## 2020-10-29 PROCEDURE — 90694 VACC AIIV4 NO PRSRV 0.5ML IM: CPT | Performed by: NURSE PRACTITIONER

## 2020-10-29 PROCEDURE — 80053 COMPREHEN METABOLIC PANEL: CPT | Performed by: NURSE PRACTITIONER

## 2020-10-29 PROCEDURE — 82570 ASSAY OF URINE CREATININE: CPT | Performed by: NURSE PRACTITIONER

## 2020-10-29 PROCEDURE — 99214 OFFICE O/P EST MOD 30 MIN: CPT | Performed by: NURSE PRACTITIONER

## 2020-10-29 PROCEDURE — G0008 ADMIN INFLUENZA VIRUS VAC: HCPCS | Performed by: NURSE PRACTITIONER

## 2020-10-29 PROCEDURE — 80061 LIPID PANEL: CPT | Performed by: NURSE PRACTITIONER

## 2020-10-29 PROCEDURE — 82306 VITAMIN D 25 HYDROXY: CPT | Performed by: NURSE PRACTITIONER

## 2020-10-29 PROCEDURE — 83036 HEMOGLOBIN GLYCOSYLATED A1C: CPT | Performed by: NURSE PRACTITIONER

## 2020-10-29 PROCEDURE — 82043 UR ALBUMIN QUANTITATIVE: CPT | Performed by: NURSE PRACTITIONER

## 2020-10-29 PROCEDURE — 84443 ASSAY THYROID STIM HORMONE: CPT | Performed by: NURSE PRACTITIONER

## 2020-10-29 PROCEDURE — 85025 COMPLETE CBC W/AUTO DIFF WBC: CPT | Performed by: NURSE PRACTITIONER

## 2020-11-02 ENCOUNTER — TELEPHONE (OUTPATIENT)
Dept: FAMILY MEDICINE CLINIC | Facility: CLINIC | Age: 80
End: 2020-11-02

## 2020-11-02 NOTE — TELEPHONE ENCOUNTER
PATIENT'S DAUGHTER VALERIANO CALLED IN FOR  HER MOM ASKING DR RIVERA TO PROVIDE A PRESCRIPTION FOR THE SHINGLES SHOT.      PATIENT STATED THAT IF THEY LEAVE IT AT THE  THAN THEY WILL PICK IT UP.    DAUGHTER WANTS YOU TO CALL HER SO SHE CAN CALL HER MOM AND LET HER MOM KNOW THAT THE PRESCRIPTION IS READY.       CALL BACK: [phone]

## 2020-11-02 NOTE — TELEPHONE ENCOUNTER
Returned call to Ms Garzon's daughter to let her know that Jodie is out of the office today.she can check on the Rx for the Shingles shot tomorrow.

## 2020-11-02 NOTE — TELEPHONE ENCOUNTER
----- Message from CHEYENNE Lerma sent at 10/30/2020  3:26 PM CDT -----  Severe arthritis and bone spurs noted in her neck.  I would recommend a referral to a specialist to see if we can get her some relief if she is open to this?

## 2020-11-03 RX ORDER — ERGOCALCIFEROL 1.25 MG/1
50000 CAPSULE ORAL
Qty: 12 CAPSULE | Refills: 3 | Status: SHIPPED | OUTPATIENT
Start: 2020-11-03 | End: 2021-01-01 | Stop reason: SDUPTHER

## 2020-11-04 NOTE — TELEPHONE ENCOUNTER
Returned call to Jennifer Chavez Duran;s Daughter to inform her that the Rx is ready for her to  at the  to .

## 2020-11-06 DIAGNOSIS — M50.30 DDD (DEGENERATIVE DISC DISEASE), CERVICAL: Primary | ICD-10-CM

## 2020-11-06 DIAGNOSIS — M54.2 NECK PAIN: ICD-10-CM

## 2020-11-09 ENCOUNTER — TELEPHONE (OUTPATIENT)
Dept: FAMILY MEDICINE CLINIC | Facility: CLINIC | Age: 80
End: 2020-11-09

## 2020-11-09 NOTE — TELEPHONE ENCOUNTER
----- Message from CHEYENNE Lerma sent at 11/3/2020  4:03 PM CST -----  Vitamin D remains slightly low.  She can add an OTC vitamin D3 supplement once daily to help boost this back up, unless she had been out of her vitamin D supplement?  A1C remains fairly well controlled at 7.19.

## 2020-11-11 ENCOUNTER — TELEPHONE (OUTPATIENT)
Dept: FAMILY MEDICINE CLINIC | Facility: CLINIC | Age: 80
End: 2020-11-11

## 2020-11-19 NOTE — PROGRESS NOTES
Subjective   Brenda Garzon is a 80 y.o. female.     She presents today for her routine follow up on chronic medical problems.  BP is well controlled today in the office.  Reports her blood sugars have been fairly well controlled.  No recent episodes of hypoglycemia.  She is doing well on her statin therapy for cholesterol management without side effects.  She reports that her arthritis symptoms have been fairly well controlled on the Tylenol Arthritis that she is currently taking, however, she has been experiencing some pain in her neck and numbness in her hands.  This is concerning to her..  She reports that she has been seeing nephrology.  He did notice some asymptomatic microscopic hematuria.  She has been referred to urology in [place].  She reports that her heartburn symptoms are fairly well controlled.  She is due for repeat fasting labs.  She is otherwise without any other new complaints.    Hypertension  This is a chronic problem. The current episode started more than 1 year ago. The problem has been resolved since onset. The problem is controlled. Associated symptoms include neck pain. Pertinent negatives include no anxiety or sweats. There are no associated agents to hypertension. Risk factors for coronary artery disease include post-menopausal state, diabetes mellitus, dyslipidemia, family history, sedentary lifestyle and stress. Past treatments include angiotensin blockers. Current antihypertension treatment includes angiotensin blockers. The current treatment provides significant improvement. Compliance problems include diet and exercise.  Hypertensive end-organ damage includes kidney disease. Identifiable causes of hypertension include chronic renal disease.   Diabetes  She presents for her follow-up diabetic visit. She has type 2 diabetes mellitus. Her disease course has been stable. There are no hypoglycemic associated symptoms. Pertinent negatives for hypoglycemia include no sweats. Associated  symptoms include fatigue. There are no hypoglycemic complications. Symptoms are stable. There are no diabetic complications. Risk factors for coronary artery disease include diabetes mellitus, dyslipidemia, family history, post-menopausal, sedentary lifestyle and stress. Current diabetic treatment includes oral agent (triple therapy) and insulin injections. She is compliant with treatment most of the time. Her weight is stable. She is following a generally healthy diet. She has not had a previous visit with a dietitian. She rarely participates in exercise. An ACE inhibitor/angiotensin II receptor blocker is being taken. She does not see a podiatrist.Eye exam is not current.   Arthritis  Presents for follow-up visit. She complains of pain and joint warmth. The symptoms have been stable. Associated symptoms include fatigue, pain at night and pain while resting. Pertinent negatives include no dry eyes, dysuria, Raynaud's syndrome or uveitis. Compliance with total regimen is %. Compliance with medications is %.        The following portions of the patient's history were reviewed and updated as appropriate: allergies, current medications, past family history, past medical history, past social history, past surgical history and problem list.    Review of Systems   Constitutional: Positive for fatigue.   HENT: Negative.    Eyes: Negative.    Respiratory: Negative.    Cardiovascular: Negative.    Gastrointestinal: Negative.  Negative for indigestion.   Genitourinary: Negative for dysuria.   Musculoskeletal: Positive for arthralgias, arthritis, myalgias, neck pain and neck stiffness.   Skin: Negative.    Allergic/Immunologic: Negative.    Neurological: Negative.    Hematological: Negative.    Psychiatric/Behavioral: Negative.        Objective   Physical Exam  Vitals signs reviewed.   Constitutional:       General: She is not in acute distress.     Appearance: She is well-developed. She is not diaphoretic.   HENT:       Head: Normocephalic.      Right Ear: External ear normal.      Left Ear: External ear normal.      Nose: Nose normal.   Eyes:      Pupils: Pupils are equal, round, and reactive to light.   Neck:      Musculoskeletal: Normal range of motion and neck supple.      Thyroid: No thyromegaly.      Vascular: No JVD.   Cardiovascular:      Rate and Rhythm: Normal rate and regular rhythm.      Heart sounds: No murmur. No friction rub. No gallop.    Pulmonary:      Effort: Pulmonary effort is normal. No respiratory distress.      Breath sounds: Normal breath sounds. No wheezing or rales.   Musculoskeletal: Normal range of motion.      Cervical back: She exhibits pain and spasm.   Skin:     General: Skin is warm and dry.      Coloration: Skin is not pale.      Findings: No erythema or rash.   Neurological:      Mental Status: She is alert and oriented to person, place, and time.   Psychiatric:         Behavior: Behavior normal.         Thought Content: Thought content normal.         Judgment: Judgment normal.           Assessment/Plan   Diagnoses and all orders for this visit:    1. Numbness of fingers of both hands (Primary)  -     XR Spine Cervical Complete 4 or 5 View  -     CBC & Differential  -     Comprehensive Metabolic Panel  -     Hemoglobin A1c  -     Lipid Panel  -     TSH  -     Vitamin D 25 Hydroxy  -     Microalbumin / Creatinine Urine Ratio - Urine, Clean Catch  -     CBC Auto Differential  -     Cancel: Manual Differential    2. Essential hypertension  -     CBC & Differential  -     Comprehensive Metabolic Panel  -     Hemoglobin A1c  -     Lipid Panel  -     TSH  -     Vitamin D 25 Hydroxy  -     Microalbumin / Creatinine Urine Ratio - Urine, Clean Catch  -     CBC Auto Differential  -     Cancel: Manual Differential    3. Vitamin D deficiency  -     CBC & Differential  -     Comprehensive Metabolic Panel  -     Hemoglobin A1c  -     Lipid Panel  -     TSH  -     Vitamin D 25 Hydroxy  -     Microalbumin  / Creatinine Urine Ratio - Urine, Clean Catch  -     CBC Auto Differential  -     Cancel: Manual Differential    4. Dyslipidemia  -     CBC & Differential  -     Comprehensive Metabolic Panel  -     Hemoglobin A1c  -     Lipid Panel  -     TSH  -     Vitamin D 25 Hydroxy  -     Microalbumin / Creatinine Urine Ratio - Urine, Clean Catch  -     CBC Auto Differential  -     Cancel: Manual Differential    5. Decreased GFR  -     CBC & Differential  -     Comprehensive Metabolic Panel  -     Hemoglobin A1c  -     Lipid Panel  -     TSH  -     Vitamin D 25 Hydroxy  -     Microalbumin / Creatinine Urine Ratio - Urine, Clean Catch  -     CBC Auto Differential  -     Cancel: Manual Differential    6. Type 2 diabetes mellitus with hypoglycemia without coma, with long-term current use of insulin (CMS/[facility])  -     CBC & Differential  -     Comprehensive Metabolic Panel  -     Hemoglobin A1c  -     Lipid Panel  -     TSH  -     Vitamin D 25 Hydroxy  -     Microalbumin / Creatinine Urine Ratio - Urine, Clean Catch  -     CBC Auto Differential  -     Cancel: Manual Differential    7. Screening for thyroid disorder  -     CBC & Differential  -     Comprehensive Metabolic Panel  -     Hemoglobin A1c  -     Lipid Panel  -     TSH  -     Vitamin D 25 Hydroxy  -     Microalbumin / Creatinine Urine Ratio - Urine, Clean Catch  -     CBC Auto Differential  -     Cancel: Manual Differential    8. Screening for osteoporosis  -     DEXA Bone Density Axial; Future    9. Encounter for screening mammogram for breast cancer  -     Mammo Screening Bilateral With CAD; Future    10. Disorder of cartilage, unspecified   -     DEXA Bone Density Axial; Future    11. Needs flu shot  -     Fluad Quad 65+ yrs ([id number])               Patient's Body mass index is 21.67 kg/m². BMI is within normal parameters. No follow-up required..    Flu shot given IM in the office.  Schedule for screening mammogram and bone density.  Fasting labs.  X-ray following  office visit.  Continue current medications.  Follow up in 3 months for routine follow up.  Follow up sooner for problems/concerns.  Patient verbalized understanding and agreement with plan of care.        This document has been electronically signed by CHEYENNE Lerma on November 19, 2020 10:25 CST

## 2020-12-08 DIAGNOSIS — R11.0 CHRONIC NAUSEA: ICD-10-CM

## 2020-12-08 DIAGNOSIS — Z79.4 TYPE 2 DIABETES MELLITUS WITH HYPOGLYCEMIA WITHOUT COMA, WITH LONG-TERM CURRENT USE OF INSULIN (HCC): ICD-10-CM

## 2020-12-08 DIAGNOSIS — I10 ESSENTIAL HYPERTENSION: Chronic | ICD-10-CM

## 2020-12-08 DIAGNOSIS — E11.649 TYPE 2 DIABETES MELLITUS WITH HYPOGLYCEMIA WITHOUT COMA, WITH LONG-TERM CURRENT USE OF INSULIN (HCC): ICD-10-CM

## 2020-12-08 DIAGNOSIS — R12 HEARTBURN: ICD-10-CM

## 2020-12-10 RX ORDER — SUCRALFATE 1 G/1
1 TABLET ORAL
Qty: 120 TABLET | Refills: 2 | Status: SHIPPED | OUTPATIENT
Start: 2020-12-10 | End: 2021-01-01 | Stop reason: SDUPTHER

## 2020-12-10 RX ORDER — LOSARTAN POTASSIUM 50 MG/1
50 TABLET ORAL 2 TIMES DAILY
Qty: 180 TABLET | Refills: 1 | Status: SHIPPED | OUTPATIENT
Start: 2020-12-10 | End: 2021-01-01 | Stop reason: SDUPTHER

## 2021-01-01 ENCOUNTER — OFFICE VISIT (OUTPATIENT)
Dept: FAMILY MEDICINE CLINIC | Facility: CLINIC | Age: 81
End: 2021-01-01

## 2021-01-01 ENCOUNTER — TELEPHONE (OUTPATIENT)
Dept: FAMILY MEDICINE CLINIC | Facility: CLINIC | Age: 81
End: 2021-01-01

## 2021-01-01 ENCOUNTER — TELEPHONE (OUTPATIENT)
Dept: GASTROENTEROLOGY | Facility: CLINIC | Age: 81
End: 2021-01-01

## 2021-01-01 ENCOUNTER — ANESTHESIA (OUTPATIENT)
Dept: GASTROENTEROLOGY | Facility: HOSPITAL | Age: 81
End: 2021-01-01

## 2021-01-01 ENCOUNTER — LAB (OUTPATIENT)
Dept: LAB | Facility: HOSPITAL | Age: 81
End: 2021-01-01

## 2021-01-01 ENCOUNTER — HOSPITAL ENCOUNTER (OUTPATIENT)
Facility: HOSPITAL | Age: 81
Setting detail: HOSPITAL OUTPATIENT SURGERY
Discharge: HOME | End: 2021-06-28
Attending: INTERNAL MEDICINE | Admitting: INTERNAL MEDICINE

## 2021-01-01 ENCOUNTER — ANESTHESIA EVENT (OUTPATIENT)
Dept: GASTROENTEROLOGY | Facility: HOSPITAL | Age: 81
End: 2021-01-01

## 2021-01-01 ENCOUNTER — HOSPITAL ENCOUNTER (OUTPATIENT)
Facility: HOSPITAL | Age: 81
Setting detail: HOSPITAL OUTPATIENT SURGERY
Discharge: HOME | End: 2021-05-10
Attending: INTERNAL MEDICINE | Admitting: INTERNAL MEDICINE

## 2021-01-01 ENCOUNTER — OFFICE VISIT (OUTPATIENT)
Dept: GASTROENTEROLOGY | Facility: CLINIC | Age: 81
End: 2021-01-01

## 2021-01-01 ENCOUNTER — HOSPITAL ENCOUNTER (OUTPATIENT)
Facility: HOSPITAL | Age: 81
Setting detail: HOSPITAL OUTPATIENT SURGERY
Discharge: HOME | End: 2021-03-16
Attending: INTERNAL MEDICINE | Admitting: INTERNAL MEDICINE

## 2021-01-01 VITALS
HEART RATE: 90 BPM | HEIGHT: 56 IN | DIASTOLIC BLOOD PRESSURE: 62 MMHG | HEART RATE: 79 BPM | WEIGHT: 102.8 LBS | OXYGEN SATURATION: 98 % | SYSTOLIC BLOOD PRESSURE: 96 MMHG | BODY MASS INDEX: 20.72 KG/M2 | RESPIRATION RATE: 20 BRPM | BODY MASS INDEX: 22.72 KG/M2 | DIASTOLIC BLOOD PRESSURE: 77 MMHG | HEIGHT: 59 IN | SYSTOLIC BLOOD PRESSURE: 137 MMHG | WEIGHT: 101 LBS | TEMPERATURE: 97.3 F

## 2021-01-01 VITALS
BODY MASS INDEX: 21.24 KG/M2 | DIASTOLIC BLOOD PRESSURE: 70 MMHG | TEMPERATURE: 97.3 F | WEIGHT: 105.38 LBS | HEIGHT: 59 IN | RESPIRATION RATE: 20 BRPM | OXYGEN SATURATION: 97 % | SYSTOLIC BLOOD PRESSURE: 110 MMHG | HEART RATE: 94 BPM

## 2021-01-01 VITALS
DIASTOLIC BLOOD PRESSURE: 56 MMHG | WEIGHT: 105 LBS | SYSTOLIC BLOOD PRESSURE: 116 MMHG | BODY MASS INDEX: 21.17 KG/M2 | HEART RATE: 69 BPM | HEIGHT: 59 IN

## 2021-01-01 VITALS
DIASTOLIC BLOOD PRESSURE: 60 MMHG | OXYGEN SATURATION: 98 % | HEART RATE: 94 BPM | BODY MASS INDEX: 23.08 KG/M2 | WEIGHT: 102.6 LBS | SYSTOLIC BLOOD PRESSURE: 126 MMHG | HEIGHT: 56 IN | TEMPERATURE: 97.9 F

## 2021-01-01 VITALS
HEART RATE: 77 BPM | WEIGHT: 103 LBS | RESPIRATION RATE: 20 BRPM | DIASTOLIC BLOOD PRESSURE: 67 MMHG | SYSTOLIC BLOOD PRESSURE: 127 MMHG | TEMPERATURE: 97.6 F | OXYGEN SATURATION: 100 % | BODY MASS INDEX: 23.17 KG/M2 | HEIGHT: 56 IN

## 2021-01-01 VITALS
HEART RATE: 72 BPM | SYSTOLIC BLOOD PRESSURE: 148 MMHG | WEIGHT: 108 LBS | HEIGHT: 59 IN | DIASTOLIC BLOOD PRESSURE: 60 MMHG | BODY MASS INDEX: 21.77 KG/M2

## 2021-01-01 VITALS
HEIGHT: 60 IN | HEART RATE: 78 BPM | WEIGHT: 104.94 LBS | RESPIRATION RATE: 18 BRPM | DIASTOLIC BLOOD PRESSURE: 47 MMHG | BODY MASS INDEX: 20.6 KG/M2 | OXYGEN SATURATION: 99 % | SYSTOLIC BLOOD PRESSURE: 73 MMHG | TEMPERATURE: 97.4 F

## 2021-01-01 VITALS
HEART RATE: 86 BPM | HEIGHT: 60 IN | SYSTOLIC BLOOD PRESSURE: 101 MMHG | WEIGHT: 102.5 LBS | DIASTOLIC BLOOD PRESSURE: 57 MMHG | BODY MASS INDEX: 20.12 KG/M2

## 2021-01-01 VITALS
HEIGHT: 59 IN | BODY MASS INDEX: 21.53 KG/M2 | HEART RATE: 67 BPM | SYSTOLIC BLOOD PRESSURE: 93 MMHG | WEIGHT: 106.8 LBS | DIASTOLIC BLOOD PRESSURE: 58 MMHG

## 2021-01-01 VITALS
HEIGHT: 59 IN | OXYGEN SATURATION: 97 % | SYSTOLIC BLOOD PRESSURE: 122 MMHG | TEMPERATURE: 96.9 F | HEART RATE: 75 BPM | DIASTOLIC BLOOD PRESSURE: 70 MMHG | BODY MASS INDEX: 21.81 KG/M2 | RESPIRATION RATE: 20 BRPM | WEIGHT: 108.2 LBS

## 2021-01-01 VITALS
OXYGEN SATURATION: 98 % | WEIGHT: 108 LBS | TEMPERATURE: 97.3 F | HEIGHT: 59 IN | RESPIRATION RATE: 20 BRPM | BODY MASS INDEX: 21.77 KG/M2 | SYSTOLIC BLOOD PRESSURE: 112 MMHG | HEART RATE: 69 BPM | DIASTOLIC BLOOD PRESSURE: 84 MMHG

## 2021-01-01 DIAGNOSIS — R13.10 DYSPHAGIA, UNSPECIFIED TYPE: ICD-10-CM

## 2021-01-01 DIAGNOSIS — D50.0 ANEMIA, BLOOD LOSS: ICD-10-CM

## 2021-01-01 DIAGNOSIS — Z78.0 POSTMENOPAUSAL: ICD-10-CM

## 2021-01-01 DIAGNOSIS — Z79.4 TYPE 2 DIABETES MELLITUS WITH HYPOGLYCEMIA WITHOUT COMA, WITH LONG-TERM CURRENT USE OF INSULIN (HCC): Primary | ICD-10-CM

## 2021-01-01 DIAGNOSIS — D72.829 LEUKOCYTOSIS, UNSPECIFIED TYPE: ICD-10-CM

## 2021-01-01 DIAGNOSIS — I10 ESSENTIAL HYPERTENSION: Chronic | ICD-10-CM

## 2021-01-01 DIAGNOSIS — Z01.818 PREOP TESTING: Primary | ICD-10-CM

## 2021-01-01 DIAGNOSIS — E04.1 THYROID NODULE: ICD-10-CM

## 2021-01-01 DIAGNOSIS — Z80.0 FAMILY HISTORY OF GI MALIGNANCY: ICD-10-CM

## 2021-01-01 DIAGNOSIS — E11.649 TYPE 2 DIABETES MELLITUS WITH HYPOGLYCEMIA WITHOUT COMA, WITH LONG-TERM CURRENT USE OF INSULIN (HCC): Primary | ICD-10-CM

## 2021-01-01 DIAGNOSIS — E78.5 DYSLIPIDEMIA: Primary | ICD-10-CM

## 2021-01-01 DIAGNOSIS — K22.2 PEPTIC STRICTURE OF ESOPHAGUS: ICD-10-CM

## 2021-01-01 DIAGNOSIS — R12 HEARTBURN: Primary | ICD-10-CM

## 2021-01-01 DIAGNOSIS — R42 DIZZINESS: ICD-10-CM

## 2021-01-01 DIAGNOSIS — Z79.4 TYPE 2 DIABETES MELLITUS WITH HYPOGLYCEMIA WITHOUT COMA, WITH LONG-TERM CURRENT USE OF INSULIN (HCC): ICD-10-CM

## 2021-01-01 DIAGNOSIS — R94.4 DECREASED GFR: ICD-10-CM

## 2021-01-01 DIAGNOSIS — M19.90 CHRONIC ARTHRITIS: ICD-10-CM

## 2021-01-01 DIAGNOSIS — R12 HEARTBURN: ICD-10-CM

## 2021-01-01 DIAGNOSIS — R11.2 NAUSEA VOMITING AND DIARRHEA: ICD-10-CM

## 2021-01-01 DIAGNOSIS — R10.10 PAIN OF UPPER ABDOMEN: ICD-10-CM

## 2021-01-01 DIAGNOSIS — K22.2 PEPTIC STRICTURE OF ESOPHAGUS: Primary | ICD-10-CM

## 2021-01-01 DIAGNOSIS — Z79.4 TYPE 2 DIABETES MELLITUS WITH HYPOGLYCEMIA WITHOUT COMA, WITH LONG-TERM CURRENT USE OF INSULIN (HCC): Chronic | ICD-10-CM

## 2021-01-01 DIAGNOSIS — E61.1 IRON DEFICIENCY: Primary | ICD-10-CM

## 2021-01-01 DIAGNOSIS — D50.0 IRON DEFICIENCY ANEMIA DUE TO CHRONIC BLOOD LOSS: ICD-10-CM

## 2021-01-01 DIAGNOSIS — M25.552 LEFT HIP PAIN: ICD-10-CM

## 2021-01-01 DIAGNOSIS — R19.7 NAUSEA VOMITING AND DIARRHEA: ICD-10-CM

## 2021-01-01 DIAGNOSIS — Z86.010 HISTORY OF COLON POLYPS: ICD-10-CM

## 2021-01-01 DIAGNOSIS — M54.2 CHRONIC NECK PAIN: ICD-10-CM

## 2021-01-01 DIAGNOSIS — E11.65 UNCONTROLLED TYPE 2 DIABETES MELLITUS WITH HYPERGLYCEMIA (HCC): Chronic | ICD-10-CM

## 2021-01-01 DIAGNOSIS — R79.89 ELEVATED LFTS: ICD-10-CM

## 2021-01-01 DIAGNOSIS — Z12.31 ENCOUNTER FOR SCREENING MAMMOGRAM FOR BREAST CANCER: ICD-10-CM

## 2021-01-01 DIAGNOSIS — G89.29 CHRONIC NECK PAIN: ICD-10-CM

## 2021-01-01 DIAGNOSIS — E04.1 THYROID NODULE: Chronic | ICD-10-CM

## 2021-01-01 DIAGNOSIS — Z79.4 TYPE 2 DIABETES MELLITUS WITH COMPLICATION, WITH LONG-TERM CURRENT USE OF INSULIN (HCC): ICD-10-CM

## 2021-01-01 DIAGNOSIS — J22 LOWER RESPIRATORY INFECTION (E.G., BRONCHITIS, PNEUMONIA, PNEUMONITIS, PULMONITIS): Primary | ICD-10-CM

## 2021-01-01 DIAGNOSIS — K21.00 GASTROESOPHAGEAL REFLUX DISEASE WITH ESOPHAGITIS WITHOUT HEMORRHAGE: Primary | ICD-10-CM

## 2021-01-01 DIAGNOSIS — R06.02 SOB (SHORTNESS OF BREATH): ICD-10-CM

## 2021-01-01 DIAGNOSIS — R11.0 CHRONIC NAUSEA: ICD-10-CM

## 2021-01-01 DIAGNOSIS — K21.00 GASTROESOPHAGEAL REFLUX DISEASE WITH ESOPHAGITIS WITHOUT HEMORRHAGE: ICD-10-CM

## 2021-01-01 DIAGNOSIS — Z23 NEEDS FLU SHOT: ICD-10-CM

## 2021-01-01 DIAGNOSIS — E04.2 MULTINODULAR THYROID: ICD-10-CM

## 2021-01-01 DIAGNOSIS — R53.83 NO ENERGY: ICD-10-CM

## 2021-01-01 DIAGNOSIS — R73.09 ELEVATED GLUCOSE: Primary | ICD-10-CM

## 2021-01-01 DIAGNOSIS — E78.5 DYSLIPIDEMIA: ICD-10-CM

## 2021-01-01 DIAGNOSIS — E11.649 TYPE 2 DIABETES MELLITUS WITH HYPOGLYCEMIA WITHOUT COMA, WITH LONG-TERM CURRENT USE OF INSULIN (HCC): ICD-10-CM

## 2021-01-01 DIAGNOSIS — E11.8 TYPE 2 DIABETES MELLITUS WITH COMPLICATION, WITH LONG-TERM CURRENT USE OF INSULIN (HCC): ICD-10-CM

## 2021-01-01 DIAGNOSIS — Z13.820 SCREENING FOR OSTEOPOROSIS: ICD-10-CM

## 2021-01-01 DIAGNOSIS — E11.649 TYPE 2 DIABETES MELLITUS WITH HYPOGLYCEMIA WITHOUT COMA, WITH LONG-TERM CURRENT USE OF INSULIN (HCC): Chronic | ICD-10-CM

## 2021-01-01 DIAGNOSIS — E16.2 HYPOGLYCEMIA: ICD-10-CM

## 2021-01-01 DIAGNOSIS — R73.09 ELEVATED GLUCOSE: ICD-10-CM

## 2021-01-01 DIAGNOSIS — E11.65 UNCONTROLLED TYPE 2 DIABETES MELLITUS WITH HYPERGLYCEMIA (HCC): Primary | ICD-10-CM

## 2021-01-01 DIAGNOSIS — E78.5 DYSLIPIDEMIA: Chronic | ICD-10-CM

## 2021-01-01 DIAGNOSIS — D64.9 ANEMIA, UNSPECIFIED TYPE: ICD-10-CM

## 2021-01-01 DIAGNOSIS — D50.9 IRON DEFICIENCY ANEMIA, UNSPECIFIED IRON DEFICIENCY ANEMIA TYPE: Primary | Chronic | ICD-10-CM

## 2021-01-01 DIAGNOSIS — R07.9 CHEST PAIN, UNSPECIFIED TYPE: ICD-10-CM

## 2021-01-01 DIAGNOSIS — E55.9 VITAMIN D DEFICIENCY: Chronic | ICD-10-CM

## 2021-01-01 DIAGNOSIS — D50.0 ANEMIA, BLOOD LOSS: Primary | ICD-10-CM

## 2021-01-01 LAB
ALBUMIN SERPL-MCNC: 3.9 G/DL (ref 3.5–5.2)
ALBUMIN SERPL-MCNC: 3.9 G/DL (ref 3.5–5.2)
ALBUMIN SERPL-MCNC: 4 G/DL (ref 3.5–5.2)
ALBUMIN SERPL-MCNC: 4 G/DL (ref 3.5–5.2)
ALBUMIN/GLOB SERPL: 1.1 G/DL
ALBUMIN/GLOB SERPL: 1.2 G/DL
ALBUMIN/GLOB SERPL: 1.4 G/DL
ALBUMIN/GLOB SERPL: 1.4 G/DL
ALP SERPL-CCNC: 147 U/L (ref 39–117)
ALP SERPL-CCNC: 149 U/L (ref 39–117)
ALP SERPL-CCNC: 83 U/L (ref 39–117)
ALP SERPL-CCNC: 91 U/L (ref 39–117)
ALT SERPL W P-5'-P-CCNC: 13 U/L (ref 1–33)
ALT SERPL W P-5'-P-CCNC: 14 U/L (ref 1–33)
ALT SERPL W P-5'-P-CCNC: 21 U/L (ref 1–33)
ALT SERPL W P-5'-P-CCNC: 22 U/L (ref 1–33)
ANION GAP SERPL CALCULATED.3IONS-SCNC: 10.8 MMOL/L (ref 5–15)
ANION GAP SERPL CALCULATED.3IONS-SCNC: 11.3 MMOL/L (ref 5–15)
ANION GAP SERPL CALCULATED.3IONS-SCNC: 9.1 MMOL/L (ref 5–15)
ANION GAP SERPL CALCULATED.3IONS-SCNC: 9.7 MMOL/L (ref 5–15)
AST SERPL-CCNC: 17 U/L (ref 1–32)
AST SERPL-CCNC: 18 U/L (ref 1–32)
AST SERPL-CCNC: 21 U/L (ref 1–32)
AST SERPL-CCNC: 28 U/L (ref 1–32)
BASOPHILS # BLD AUTO: 0.05 10*3/MM3 (ref 0–0.2)
BASOPHILS # BLD AUTO: 0.06 10*3/MM3 (ref 0–0.2)
BASOPHILS NFR BLD AUTO: 0.6 % (ref 0–1.5)
BASOPHILS NFR BLD AUTO: 0.9 % (ref 0–1.5)
BILIRUB SERPL-MCNC: 0.2 MG/DL (ref 0–1.2)
BILIRUB SERPL-MCNC: 0.3 MG/DL (ref 0–1.2)
BILIRUB SERPL-MCNC: 0.4 MG/DL (ref 0–1.2)
BILIRUB SERPL-MCNC: 0.5 MG/DL (ref 0–1.2)
BUN SERPL-MCNC: 19 MG/DL (ref 8–23)
BUN SERPL-MCNC: 21 MG/DL (ref 8–23)
BUN SERPL-MCNC: 22 MG/DL (ref 8–23)
BUN SERPL-MCNC: 29 MG/DL (ref 8–23)
BUN/CREAT SERPL: 18.6 (ref 7–25)
BUN/CREAT SERPL: 20.4 (ref 7–25)
BUN/CREAT SERPL: 22.7 (ref 7–25)
BUN/CREAT SERPL: 28.4 (ref 7–25)
CALCIUM SPEC-SCNC: 10.1 MG/DL (ref 8.6–10.5)
CALCIUM SPEC-SCNC: 10.1 MG/DL (ref 8.6–10.5)
CALCIUM SPEC-SCNC: 9.3 MG/DL (ref 8.6–10.5)
CALCIUM SPEC-SCNC: 9.4 MG/DL (ref 8.6–10.5)
CHLORIDE SERPL-SCNC: 100 MMOL/L (ref 98–107)
CHLORIDE SERPL-SCNC: 102 MMOL/L (ref 98–107)
CHLORIDE SERPL-SCNC: 103 MMOL/L (ref 98–107)
CHLORIDE SERPL-SCNC: 99 MMOL/L (ref 98–107)
CO2 SERPL-SCNC: 22.7 MMOL/L (ref 22–29)
CO2 SERPL-SCNC: 24.9 MMOL/L (ref 22–29)
CO2 SERPL-SCNC: 25.2 MMOL/L (ref 22–29)
CO2 SERPL-SCNC: 26.3 MMOL/L (ref 22–29)
CREAT SERPL-MCNC: 0.97 MG/DL (ref 0.57–1)
CREAT SERPL-MCNC: 1.02 MG/DL (ref 0.57–1)
CREAT SERPL-MCNC: 1.02 MG/DL (ref 0.57–1)
CREAT SERPL-MCNC: 1.03 MG/DL (ref 0.57–1)
DEPRECATED RDW RBC AUTO: 38.9 FL (ref 37–54)
DEPRECATED RDW RBC AUTO: 39.2 FL (ref 37–54)
DEPRECATED RDW RBC AUTO: 40.5 FL (ref 37–54)
DEPRECATED RDW RBC AUTO: 43.8 FL (ref 37–54)
EOSINOPHIL # BLD AUTO: 0.15 10*3/MM3 (ref 0–0.4)
EOSINOPHIL # BLD AUTO: 0.19 10*3/MM3 (ref 0–0.4)
EOSINOPHIL NFR BLD AUTO: 2.3 % (ref 0.3–6.2)
EOSINOPHIL NFR BLD AUTO: 2.3 % (ref 0.3–6.2)
ERYTHROCYTE [DISTWIDTH] IN BLOOD BY AUTOMATED COUNT: 14.1 % (ref 12.3–15.4)
ERYTHROCYTE [DISTWIDTH] IN BLOOD BY AUTOMATED COUNT: 14.3 % (ref 12.3–15.4)
ERYTHROCYTE [DISTWIDTH] IN BLOOD BY AUTOMATED COUNT: 14.3 % (ref 12.3–15.4)
ERYTHROCYTE [DISTWIDTH] IN BLOOD BY AUTOMATED COUNT: 15.2 % (ref 12.3–15.4)
EXPIRATION DATE: ABNORMAL
FERRITIN SERPL-MCNC: 17 NG/ML (ref 13–150)
FERRITIN SERPL-MCNC: 20.3 NG/ML (ref 13–150)
GFR SERPL CREATININE-BSD FRML MDRD: 51 ML/MIN/1.73
GFR SERPL CREATININE-BSD FRML MDRD: 52 ML/MIN/1.73
GFR SERPL CREATININE-BSD FRML MDRD: 52 ML/MIN/1.73
GFR SERPL CREATININE-BSD FRML MDRD: 55 ML/MIN/1.73
GLOBULIN UR ELPH-MCNC: 2.8 GM/DL
GLOBULIN UR ELPH-MCNC: 2.8 GM/DL
GLOBULIN UR ELPH-MCNC: 3.2 GM/DL
GLOBULIN UR ELPH-MCNC: 3.6 GM/DL
GLUCOSE BLDC GLUCOMTR-MCNC: 105 MG/DL (ref 70–130)
GLUCOSE BLDC GLUCOMTR-MCNC: 77 MG/DL (ref 70–130)
GLUCOSE SERPL-MCNC: 117 MG/DL (ref 65–99)
GLUCOSE SERPL-MCNC: 121 MG/DL (ref 65–99)
GLUCOSE SERPL-MCNC: 238 MG/DL (ref 65–99)
GLUCOSE SERPL-MCNC: 310 MG/DL (ref 65–99)
HBA1C MFR BLD: 6.9 % (ref 4.8–5.6)
HBA1C MFR BLD: 7.4 %
HCT VFR BLD AUTO: 30.6 % (ref 34–46.6)
HCT VFR BLD AUTO: 31.1 % (ref 34–46.6)
HCT VFR BLD AUTO: 31.4 % (ref 34–46.6)
HCT VFR BLD AUTO: 31.7 % (ref 34–46.6)
HCT VFR BLD AUTO: 34.3 % (ref 34–46.6)
HGB BLD-MCNC: 10.1 G/DL (ref 12–15.9)
HGB BLD-MCNC: 10.1 G/DL (ref 12–15.9)
HGB BLD-MCNC: 10.3 G/DL (ref 12–15.9)
HGB BLD-MCNC: 11 G/DL (ref 12–15.9)
HGB BLD-MCNC: 9.9 G/DL (ref 12–15.9)
IMM GRANULOCYTES # BLD AUTO: 0.02 10*3/MM3 (ref 0–0.05)
IMM GRANULOCYTES NFR BLD AUTO: 0.3 % (ref 0–0.5)
IRON 24H UR-MRATE: 26 MCG/DL (ref 37–145)
IRON 24H UR-MRATE: 33 MCG/DL (ref 37–145)
IRON 24H UR-MRATE: 48 MCG/DL (ref 37–145)
IRON SATN MFR SERPL: 5 % (ref 20–50)
IRON SATN MFR SERPL: 6 % (ref 20–50)
IRON SATN MFR SERPL: 9 % (ref 20–50)
LAB AP CASE REPORT: NORMAL
LYMPHOCYTES # BLD AUTO: 1.57 10*3/MM3 (ref 0.7–3.1)
LYMPHOCYTES # BLD AUTO: 1.98 10*3/MM3 (ref 0.7–3.1)
LYMPHOCYTES NFR BLD AUTO: 24.1 % (ref 19.6–45.3)
LYMPHOCYTES NFR BLD AUTO: 24.2 % (ref 19.6–45.3)
Lab: 846
MCH RBC QN AUTO: 24.8 PG (ref 26.6–33)
MCH RBC QN AUTO: 25.1 PG (ref 26.6–33)
MCH RBC QN AUTO: 25.5 PG (ref 26.6–33)
MCH RBC QN AUTO: 25.8 PG (ref 26.6–33)
MCHC RBC AUTO-ENTMCNC: 32.1 G/DL (ref 31.5–35.7)
MCHC RBC AUTO-ENTMCNC: 32.2 G/DL (ref 31.5–35.7)
MCHC RBC AUTO-ENTMCNC: 32.4 G/DL (ref 31.5–35.7)
MCHC RBC AUTO-ENTMCNC: 32.5 G/DL (ref 31.5–35.7)
MCV RBC AUTO: 77.1 FL (ref 79–97)
MCV RBC AUTO: 77.5 FL (ref 79–97)
MCV RBC AUTO: 79.4 FL (ref 79–97)
MCV RBC AUTO: 79.6 FL (ref 79–97)
MONOCYTES # BLD AUTO: 0.47 10*3/MM3 (ref 0.1–0.9)
MONOCYTES # BLD AUTO: 0.82 10*3/MM3 (ref 0.1–0.9)
MONOCYTES NFR BLD AUTO: 10 % (ref 5–12)
MONOCYTES NFR BLD AUTO: 7.2 % (ref 5–12)
NEUTROPHILS NFR BLD AUTO: 4.22 10*3/MM3 (ref 1.7–7)
NEUTROPHILS NFR BLD AUTO: 5.14 10*3/MM3 (ref 1.7–7)
NEUTROPHILS NFR BLD AUTO: 62.8 % (ref 42.7–76)
NEUTROPHILS NFR BLD AUTO: 65.1 % (ref 42.7–76)
NRBC BLD AUTO-RTO: 0 /100 WBC (ref 0–0.2)
PATH REPORT.FINAL DX SPEC: NORMAL
PLATELET # BLD AUTO: 226 10*3/MM3 (ref 140–450)
PLATELET # BLD AUTO: 261 10*3/MM3 (ref 140–450)
PLATELET # BLD AUTO: 351 10*3/MM3 (ref 140–450)
PLATELET # BLD AUTO: 384 10*3/MM3 (ref 140–450)
PMV BLD AUTO: 12.4 FL (ref 6–12)
PMV BLD AUTO: 12.6 FL (ref 6–12)
PMV BLD AUTO: 12.7 FL (ref 6–12)
PMV BLD AUTO: 13.8 FL (ref 6–12)
POTASSIUM SERPL-SCNC: 4.5 MMOL/L (ref 3.5–5.2)
POTASSIUM SERPL-SCNC: 4.6 MMOL/L (ref 3.5–5.2)
POTASSIUM SERPL-SCNC: 4.7 MMOL/L (ref 3.5–5.2)
POTASSIUM SERPL-SCNC: 5.7 MMOL/L (ref 3.5–5.2)
PROT SERPL-MCNC: 6.7 G/DL (ref 6–8.5)
PROT SERPL-MCNC: 6.8 G/DL (ref 6–8.5)
PROT SERPL-MCNC: 7.1 G/DL (ref 6–8.5)
PROT SERPL-MCNC: 7.6 G/DL (ref 6–8.5)
RBC # BLD AUTO: 3.95 10*6/MM3 (ref 3.77–5.28)
RBC # BLD AUTO: 3.99 10*6/MM3 (ref 3.77–5.28)
RBC # BLD AUTO: 4.07 10*6/MM3 (ref 3.77–5.28)
RBC # BLD AUTO: 4.31 10*6/MM3 (ref 3.77–5.28)
SARS-COV-2 N GENE RESP QL NAA+PROBE: DETECTED
SARS-COV-2 N GENE RESP QL NAA+PROBE: NOT DETECTED
SARS-COV-2 N GENE RESP QL NAA+PROBE: NOT DETECTED
SARS-COV-2 ORF1AB RESP QL NAA+PROBE: NOT DETECTED
SODIUM SERPL-SCNC: 133 MMOL/L (ref 136–145)
SODIUM SERPL-SCNC: 136 MMOL/L (ref 136–145)
SODIUM SERPL-SCNC: 136 MMOL/L (ref 136–145)
SODIUM SERPL-SCNC: 139 MMOL/L (ref 136–145)
TIBC SERPL-MCNC: 532 MCG/DL (ref 298–536)
TIBC SERPL-MCNC: 536 MCG/DL (ref 298–536)
TIBC SERPL-MCNC: 568 MCG/DL (ref 298–536)
TRANSFERRIN SERPL-MCNC: 357 MG/DL (ref 200–360)
TRANSFERRIN SERPL-MCNC: 360 MG/DL (ref 200–360)
TRANSFERRIN SERPL-MCNC: 381 MG/DL (ref 200–360)
WBC # BLD AUTO: 6.49 10*3/MM3 (ref 3.4–10.8)
WBC # BLD AUTO: 8.2 10*3/MM3 (ref 3.4–10.8)
WBC NRBC COR # BLD: 10.79 10*3/MM3 (ref 3.4–10.8)
WBC NRBC COR # BLD: 17.59 10*3/MM3 (ref 3.4–10.8)

## 2021-01-01 PROCEDURE — 82728 ASSAY OF FERRITIN: CPT

## 2021-01-01 PROCEDURE — 85025 COMPLETE CBC W/AUTO DIFF WBC: CPT | Performed by: PHYSICIAN ASSISTANT

## 2021-01-01 PROCEDURE — 99214 OFFICE O/P EST MOD 30 MIN: CPT | Performed by: NURSE PRACTITIONER

## 2021-01-01 PROCEDURE — 99214 OFFICE O/P EST MOD 30 MIN: CPT | Performed by: PHYSICIAN ASSISTANT

## 2021-01-01 PROCEDURE — 25010000002 PHENYLEPHRINE PER 1 ML: Performed by: NURSE ANESTHETIST, CERTIFIED REGISTERED

## 2021-01-01 PROCEDURE — 83540 ASSAY OF IRON: CPT | Performed by: NURSE PRACTITIONER

## 2021-01-01 PROCEDURE — 99204 OFFICE O/P NEW MOD 45 MIN: CPT | Performed by: PHYSICIAN ASSISTANT

## 2021-01-01 PROCEDURE — 83540 ASSAY OF IRON: CPT

## 2021-01-01 PROCEDURE — 83036 HEMOGLOBIN GLYCOSYLATED A1C: CPT | Performed by: NURSE PRACTITIONER

## 2021-01-01 PROCEDURE — 90662 IIV NO PRSV INCREASED AG IM: CPT | Performed by: NURSE PRACTITIONER

## 2021-01-01 PROCEDURE — 88312 SPECIAL STAINS GROUP 1: CPT

## 2021-01-01 PROCEDURE — 99213 OFFICE O/P EST LOW 20 MIN: CPT | Performed by: NURSE PRACTITIONER

## 2021-01-01 PROCEDURE — 85018 HEMOGLOBIN: CPT

## 2021-01-01 PROCEDURE — 99441 PR PHYS/QHP TELEPHONE EVALUATION 5-10 MIN: CPT | Performed by: NURSE PRACTITIONER

## 2021-01-01 PROCEDURE — 85027 COMPLETE CBC AUTOMATED: CPT

## 2021-01-01 PROCEDURE — 43248 EGD GUIDE WIRE INSERTION: CPT | Performed by: INTERNAL MEDICINE

## 2021-01-01 PROCEDURE — 80053 COMPREHEN METABOLIC PANEL: CPT

## 2021-01-01 PROCEDURE — 85014 HEMATOCRIT: CPT

## 2021-01-01 PROCEDURE — 87635 SARS-COV-2 COVID-19 AMP PRB: CPT

## 2021-01-01 PROCEDURE — C9803 HOPD COVID-19 SPEC COLLECT: HCPCS

## 2021-01-01 PROCEDURE — 88305 TISSUE EXAM BY PATHOLOGIST: CPT

## 2021-01-01 PROCEDURE — U0004 COV-19 TEST NON-CDC HGH THRU: HCPCS

## 2021-01-01 PROCEDURE — 85025 COMPLETE CBC W/AUTO DIFF WBC: CPT

## 2021-01-01 PROCEDURE — 25010000002 PROPOFOL 10 MG/ML EMULSION: Performed by: NURSE ANESTHETIST, CERTIFIED REGISTERED

## 2021-01-01 PROCEDURE — 84466 ASSAY OF TRANSFERRIN: CPT

## 2021-01-01 PROCEDURE — G0008 ADMIN INFLUENZA VIRUS VAC: HCPCS | Performed by: NURSE PRACTITIONER

## 2021-01-01 PROCEDURE — 99213 OFFICE O/P EST LOW 20 MIN: CPT | Performed by: PHYSICIAN ASSISTANT

## 2021-01-01 PROCEDURE — G0105 COLORECTAL SCRN; HI RISK IND: HCPCS | Performed by: INTERNAL MEDICINE

## 2021-01-01 PROCEDURE — 43239 EGD BIOPSY SINGLE/MULTIPLE: CPT | Performed by: INTERNAL MEDICINE

## 2021-01-01 PROCEDURE — 82962 GLUCOSE BLOOD TEST: CPT

## 2021-01-01 PROCEDURE — 80053 COMPREHEN METABOLIC PANEL: CPT | Performed by: NURSE PRACTITIONER

## 2021-01-01 PROCEDURE — 84466 ASSAY OF TRANSFERRIN: CPT | Performed by: NURSE PRACTITIONER

## 2021-01-01 PROCEDURE — 80053 COMPREHEN METABOLIC PANEL: CPT | Performed by: PHYSICIAN ASSISTANT

## 2021-01-01 RX ORDER — SUCRALFATE 1 G/1
1 TABLET ORAL
Qty: 120 TABLET | Refills: 2 | Status: SHIPPED | OUTPATIENT
Start: 2021-01-01 | End: 2021-01-01 | Stop reason: SDUPTHER

## 2021-01-01 RX ORDER — EPHEDRINE SULFATE 50 MG/ML
INJECTION INTRAVENOUS AS NEEDED
Status: DISCONTINUED | OUTPATIENT
Start: 2021-01-01 | End: 2021-01-01

## 2021-01-01 RX ORDER — ONDANSETRON 2 MG/ML
4 INJECTION INTRAMUSCULAR; INTRAVENOUS ONCE AS NEEDED
Status: DISCONTINUED | OUTPATIENT
Start: 2021-01-01 | End: 2021-01-01 | Stop reason: HOSPADM

## 2021-01-01 RX ORDER — BLOOD SUGAR DIAGNOSTIC
1 STRIP MISCELLANEOUS SEE ADMIN INSTRUCTIONS
Qty: 200 EACH | Refills: 5 | Status: SHIPPED | OUTPATIENT
Start: 2021-01-01 | End: 2021-01-01 | Stop reason: SDUPTHER

## 2021-01-01 RX ORDER — LANOLIN ALCOHOL/MO/W.PET/CERES
325 CREAM (GRAM) TOPICAL
Qty: 90 TABLET | Refills: 3 | Status: SHIPPED | OUTPATIENT
Start: 2021-01-01 | End: 2022-12-16

## 2021-01-01 RX ORDER — FAMOTIDINE 40 MG/1
40 TABLET, FILM COATED ORAL DAILY
Qty: 90 TABLET | Refills: 3 | Status: SHIPPED | OUTPATIENT
Start: 2021-01-01

## 2021-01-01 RX ORDER — ATORVASTATIN CALCIUM 20 MG/1
20 TABLET, FILM COATED ORAL NIGHTLY
Qty: 90 TABLET | Refills: 4 | Status: SHIPPED | OUTPATIENT
Start: 2021-01-01 | End: 2021-01-01 | Stop reason: SDUPTHER

## 2021-01-01 RX ORDER — LANCETS
1 EACH MISCELLANEOUS
Qty: 400 EACH | Refills: 3 | Status: SHIPPED | OUTPATIENT
Start: 2021-01-01 | End: 2021-01-01 | Stop reason: SDUPTHER

## 2021-01-01 RX ORDER — DEXTROSE AND SODIUM CHLORIDE 5; .45 G/100ML; G/100ML
30 INJECTION, SOLUTION INTRAVENOUS CONTINUOUS PRN
Status: CANCELLED | OUTPATIENT
Start: 2021-01-01

## 2021-01-01 RX ORDER — METFORMIN HYDROCHLORIDE 500 MG/1
TABLET, EXTENDED RELEASE ORAL
Qty: 180 TABLET | Refills: 1 | Status: SHIPPED | OUTPATIENT
Start: 2021-01-01 | End: 2021-01-01 | Stop reason: SDUPTHER

## 2021-01-01 RX ORDER — PREDNISONE 20 MG/1
TABLET ORAL
COMMUNITY
Start: 2021-01-01 | End: 2021-01-01

## 2021-01-01 RX ORDER — LANOLIN ALCOHOL/MO/W.PET/CERES
325 CREAM (GRAM) TOPICAL
Qty: 90 TABLET | Refills: 3 | Status: SHIPPED | OUTPATIENT
Start: 2021-01-01 | End: 2021-01-01 | Stop reason: SDUPTHER

## 2021-01-01 RX ORDER — LANCETS
1 EACH MISCELLANEOUS
Qty: 400 EACH | Refills: 3 | Status: SHIPPED | OUTPATIENT
Start: 2021-01-01

## 2021-01-01 RX ORDER — PROMETHAZINE HYDROCHLORIDE 25 MG/1
25 TABLET ORAL ONCE AS NEEDED
Status: DISCONTINUED | OUTPATIENT
Start: 2021-01-01 | End: 2021-01-01 | Stop reason: HOSPADM

## 2021-01-01 RX ORDER — LOSARTAN POTASSIUM 50 MG/1
50 TABLET ORAL 2 TIMES DAILY
Qty: 180 TABLET | Refills: 3 | Status: SHIPPED | OUTPATIENT
Start: 2021-01-01

## 2021-01-01 RX ORDER — LIDOCAINE HYDROCHLORIDE 20 MG/ML
INJECTION, SOLUTION EPIDURAL; INFILTRATION; INTRACAUDAL; PERINEURAL AS NEEDED
Status: DISCONTINUED | OUTPATIENT
Start: 2021-01-01 | End: 2021-01-01 | Stop reason: SURG

## 2021-01-01 RX ORDER — PROMETHAZINE HYDROCHLORIDE 25 MG/1
25 SUPPOSITORY RECTAL ONCE AS NEEDED
Status: DISCONTINUED | OUTPATIENT
Start: 2021-01-01 | End: 2021-01-01 | Stop reason: HOSPADM

## 2021-01-01 RX ORDER — SENNOSIDES 8.6 MG
1300 CAPSULE ORAL EVERY 8 HOURS PRN
Qty: 120 TABLET | Refills: 5 | Status: SHIPPED | OUTPATIENT
Start: 2021-01-01

## 2021-01-01 RX ORDER — DEXTROSE AND SODIUM CHLORIDE 5; .45 G/100ML; G/100ML
30 INJECTION, SOLUTION INTRAVENOUS CONTINUOUS PRN
Status: DISCONTINUED | OUTPATIENT
Start: 2021-01-01 | End: 2021-01-01 | Stop reason: HOSPADM

## 2021-01-01 RX ORDER — ESOMEPRAZOLE MAGNESIUM 40 MG/1
40 CAPSULE, DELAYED RELEASE ORAL
Qty: 7 CAPSULE | Refills: 0 | Status: SHIPPED | OUTPATIENT
Start: 2021-01-01 | End: 2021-01-01 | Stop reason: ALTCHOICE

## 2021-01-01 RX ORDER — EPHEDRINE SULFATE 50 MG/ML
INJECTION INTRAVENOUS AS NEEDED
Status: DISCONTINUED | OUTPATIENT
Start: 2021-01-01 | End: 2021-01-01 | Stop reason: SURG

## 2021-01-01 RX ORDER — MEPERIDINE HYDROCHLORIDE 25 MG/ML
12.5 INJECTION INTRAMUSCULAR; INTRAVENOUS; SUBCUTANEOUS
Status: DISCONTINUED | OUTPATIENT
Start: 2021-01-01 | End: 2021-01-01 | Stop reason: HOSPADM

## 2021-01-01 RX ORDER — ALBUTEROL SULFATE 90 UG/1
2 AEROSOL, METERED RESPIRATORY (INHALATION) EVERY 4 HOURS PRN
Qty: 18 G | Refills: 0 | Status: SHIPPED | OUTPATIENT
Start: 2021-01-01

## 2021-01-01 RX ORDER — ERGOCALCIFEROL 1.25 MG/1
50000 CAPSULE ORAL
Qty: 12 CAPSULE | Refills: 3 | Status: SHIPPED | OUTPATIENT
Start: 2021-01-01

## 2021-01-01 RX ORDER — AZITHROMYCIN 250 MG/1
TABLET, FILM COATED ORAL
COMMUNITY
Start: 2021-01-01 | End: 2021-01-01

## 2021-01-01 RX ORDER — SODIUM CHLORIDE 450 MG/100ML
INJECTION, SOLUTION INTRAVENOUS CONTINUOUS PRN
Status: DISCONTINUED | OUTPATIENT
Start: 2021-01-01 | End: 2021-01-01 | Stop reason: SURG

## 2021-01-01 RX ORDER — LOSARTAN POTASSIUM 50 MG/1
50 TABLET ORAL 2 TIMES DAILY
Qty: 180 TABLET | Refills: 1 | Status: SHIPPED | OUTPATIENT
Start: 2021-01-01 | End: 2021-01-01 | Stop reason: SDUPTHER

## 2021-01-01 RX ORDER — PEG-3350, SODIUM SULFATE, SODIUM CHLORIDE, POTASSIUM CHLORIDE, SODIUM ASCORBATE AND ASCORBIC ACID 7.5-2.691G
1000 KIT ORAL ONCE
Qty: 1 EACH | Refills: 0 | Status: SHIPPED | OUTPATIENT
Start: 2021-01-01 | End: 2021-01-01

## 2021-01-01 RX ORDER — SUCRALFATE 1 G/1
1 TABLET ORAL
Qty: 120 TABLET | Refills: 2 | Status: SHIPPED | OUTPATIENT
Start: 2021-01-01

## 2021-01-01 RX ORDER — PEN NEEDLE, DIABETIC 30 GX3/16"
1 NEEDLE, DISPOSABLE MISCELLANEOUS DAILY
Qty: 200 EACH | Refills: 4 | Status: SHIPPED | OUTPATIENT
Start: 2021-01-01 | End: 2021-01-01 | Stop reason: SDUPTHER

## 2021-01-01 RX ORDER — METFORMIN HYDROCHLORIDE 500 MG/1
TABLET, EXTENDED RELEASE ORAL
Qty: 180 TABLET | Refills: 3 | Status: SHIPPED | OUTPATIENT
Start: 2021-01-01

## 2021-01-01 RX ORDER — BENZONATATE 100 MG/1
100 CAPSULE ORAL 3 TIMES DAILY PRN
Qty: 30 CAPSULE | Refills: 0 | Status: SHIPPED | OUTPATIENT
Start: 2021-01-01 | End: 2021-01-01

## 2021-01-01 RX ORDER — TIZANIDINE 4 MG/1
2-4 TABLET ORAL NIGHTLY PRN
Qty: 91 TABLET | Refills: 1 | Status: SHIPPED | OUTPATIENT
Start: 2021-01-01 | End: 2021-01-01 | Stop reason: SDUPTHER

## 2021-01-01 RX ORDER — FAMOTIDINE 40 MG/1
40 TABLET, FILM COATED ORAL DAILY
Qty: 30 TABLET | Refills: 2 | Status: SHIPPED | OUTPATIENT
Start: 2021-01-01 | End: 2021-01-01 | Stop reason: SDUPTHER

## 2021-01-01 RX ORDER — ONDANSETRON 4 MG/1
4 TABLET, ORALLY DISINTEGRATING ORAL EVERY 8 HOURS PRN
Qty: 15 TABLET | Refills: 0 | Status: SHIPPED | OUTPATIENT
Start: 2021-01-01

## 2021-01-01 RX ORDER — PROPOFOL 10 MG/ML
VIAL (ML) INTRAVENOUS AS NEEDED
Status: DISCONTINUED | OUTPATIENT
Start: 2021-01-01 | End: 2021-01-01 | Stop reason: SURG

## 2021-01-01 RX ORDER — BLOOD SUGAR DIAGNOSTIC
1 STRIP MISCELLANEOUS SEE ADMIN INSTRUCTIONS
Qty: 200 EACH | Refills: 5 | Status: SHIPPED | OUTPATIENT
Start: 2021-01-01

## 2021-01-01 RX ORDER — SENNOSIDES 8.6 MG
1300 CAPSULE ORAL EVERY 8 HOURS PRN
Qty: 120 TABLET | Refills: 5 | Status: SHIPPED | OUTPATIENT
Start: 2021-01-01 | End: 2021-01-01 | Stop reason: SDUPTHER

## 2021-01-01 RX ORDER — TIZANIDINE 4 MG/1
2-4 TABLET ORAL NIGHTLY PRN
Qty: 91 TABLET | Refills: 3 | Status: SHIPPED | OUTPATIENT
Start: 2021-01-01

## 2021-01-01 RX ORDER — PEN NEEDLE, DIABETIC 30 GX3/16"
1 NEEDLE, DISPOSABLE MISCELLANEOUS DAILY
Qty: 200 EACH | Refills: 4 | Status: SHIPPED | OUTPATIENT
Start: 2021-01-01

## 2021-01-01 RX ORDER — ATORVASTATIN CALCIUM 20 MG/1
20 TABLET, FILM COATED ORAL NIGHTLY
Qty: 90 TABLET | Refills: 3 | Status: SHIPPED | OUTPATIENT
Start: 2021-01-01

## 2021-01-01 RX ADMIN — PROPOFOL 20 MG: 10 INJECTION, EMULSION INTRAVENOUS at 10:49

## 2021-01-01 RX ADMIN — PROPOFOL 20 MG: 10 INJECTION, EMULSION INTRAVENOUS at 10:46

## 2021-01-01 RX ADMIN — DEXTROSE AND SODIUM CHLORIDE 30 ML/HR: 5; 450 INJECTION, SOLUTION INTRAVENOUS at 09:25

## 2021-01-01 RX ADMIN — PROPOFOL 20 MG: 10 INJECTION, EMULSION INTRAVENOUS at 09:50

## 2021-01-01 RX ADMIN — EPHEDRINE SULFATE 5 MG: 50 INJECTION INTRAVENOUS at 10:26

## 2021-01-01 RX ADMIN — DEXTROSE AND SODIUM CHLORIDE 30 ML/HR: 5; 450 INJECTION, SOLUTION INTRAVENOUS at 09:24

## 2021-01-01 RX ADMIN — PROPOFOL 50 MG: 10 INJECTION, EMULSION INTRAVENOUS at 09:45

## 2021-01-01 RX ADMIN — PROPOFOL 20 MG: 10 INJECTION, EMULSION INTRAVENOUS at 09:49

## 2021-01-01 RX ADMIN — PROPOFOL 50 MG: 10 INJECTION, EMULSION INTRAVENOUS at 10:44

## 2021-01-01 RX ADMIN — PROPOFOL 25 MG: 10 INJECTION, EMULSION INTRAVENOUS at 10:20

## 2021-01-01 RX ADMIN — PROPOFOL 20 MG: 10 INJECTION, EMULSION INTRAVENOUS at 09:47

## 2021-01-01 RX ADMIN — LIDOCAINE HYDROCHLORIDE 50 MG: 20 INJECTION, SOLUTION EPIDURAL; INFILTRATION; INTRACAUDAL; PERINEURAL at 10:11

## 2021-01-01 RX ADMIN — DEXTROSE AND SODIUM CHLORIDE 30 ML/HR: 5; 450 INJECTION, SOLUTION INTRAVENOUS at 09:51

## 2021-01-01 RX ADMIN — SODIUM CHLORIDE: 4.5 INJECTION, SOLUTION INTRAVENOUS at 10:07

## 2021-01-01 RX ADMIN — PROPOFOL 25 MG: 10 INJECTION, EMULSION INTRAVENOUS at 10:28

## 2021-01-01 RX ADMIN — EPHEDRINE SULFATE 10 MG: 50 INJECTION INTRAVENOUS at 10:12

## 2021-01-01 RX ADMIN — LIDOCAINE HYDROCHLORIDE 40 MG: 20 INJECTION, SOLUTION EPIDURAL; INFILTRATION; INTRACAUDAL; PERINEURAL at 10:44

## 2021-01-01 RX ADMIN — PROPOFOL 100 MG: 10 INJECTION, EMULSION INTRAVENOUS at 10:10

## 2021-01-01 RX ADMIN — PHENYLEPHRINE HYDROCHLORIDE 50 MCG: 10 INJECTION INTRAVENOUS at 10:16

## 2021-01-01 RX ADMIN — EPHEDRINE SULFATE 5 MG: 50 INJECTION INTRAVENOUS at 10:20

## 2021-01-01 RX ADMIN — PHENYLEPHRINE HYDROCHLORIDE 50 MCG: 10 INJECTION INTRAVENOUS at 10:28

## 2021-01-01 RX ADMIN — PHENYLEPHRINE HYDROCHLORIDE 50 MCG: 10 INJECTION INTRAVENOUS at 10:21

## 2021-01-26 ENCOUNTER — OFFICE VISIT (OUTPATIENT)
Dept: FAMILY MEDICINE CLINIC | Facility: CLINIC | Age: 81
End: 2021-01-26

## 2021-01-26 VITALS
HEART RATE: 68 BPM | RESPIRATION RATE: 20 BRPM | DIASTOLIC BLOOD PRESSURE: 80 MMHG | BODY MASS INDEX: 15.96 KG/M2 | OXYGEN SATURATION: 98 % | WEIGHT: 105.3 LBS | TEMPERATURE: 97.5 F | SYSTOLIC BLOOD PRESSURE: 120 MMHG | HEIGHT: 68 IN

## 2021-01-26 DIAGNOSIS — E78.5 DYSLIPIDEMIA: ICD-10-CM

## 2021-01-26 DIAGNOSIS — I10 ESSENTIAL HYPERTENSION: Primary | ICD-10-CM

## 2021-01-26 DIAGNOSIS — Z79.4 TYPE 2 DIABETES MELLITUS WITH HYPOGLYCEMIA WITHOUT COMA, WITH LONG-TERM CURRENT USE OF INSULIN (HCC): ICD-10-CM

## 2021-01-26 DIAGNOSIS — M50.30 DDD (DEGENERATIVE DISC DISEASE), CERVICAL: ICD-10-CM

## 2021-01-26 DIAGNOSIS — R12 HEARTBURN: ICD-10-CM

## 2021-01-26 DIAGNOSIS — E55.9 VITAMIN D DEFICIENCY: ICD-10-CM

## 2021-01-26 DIAGNOSIS — E11.649 TYPE 2 DIABETES MELLITUS WITH HYPOGLYCEMIA WITHOUT COMA, WITH LONG-TERM CURRENT USE OF INSULIN (HCC): ICD-10-CM

## 2021-01-26 DIAGNOSIS — R94.4 DECREASED GFR: ICD-10-CM

## 2021-01-26 PROCEDURE — 99214 OFFICE O/P EST MOD 30 MIN: CPT | Performed by: NURSE PRACTITIONER

## 2021-02-14 NOTE — PROGRESS NOTES
Subjective   Brenda Garzon is a 80 y.o. female.     She presents today for her routine follow up on chronic medical problems.  Her BP is well controlled today in the office.  She reports her blood sugars have been fairly well controlled.  No recent episodes of hypoglycemia.  She is doing well on her statin therapy for cholesterol management without side effects.  She reports that her arthritis symptoms have been fairly well controlled on the Tylenol Arthritis that she is currently taking.  She reports that she has been seeing nephrology.  She has also been following up with urology in [place].  She reports that her heartburn symptoms are fairly well controlled.  Her routine fasting labs are up-to-date.  She is otherwise without any other new complaints today in the office.    Hypertension  This is a chronic problem. The current episode started more than 1 year ago. The problem has been resolved since onset. The problem is controlled. Associated symptoms include neck pain. Pertinent negatives include no anxiety or sweats. There are no associated agents to hypertension. Risk factors for coronary artery disease include post-menopausal state, diabetes mellitus, dyslipidemia, family history, sedentary lifestyle and stress. Past treatments include angiotensin blockers. Current antihypertension treatment includes angiotensin blockers. The current treatment provides significant improvement. Compliance problems include diet and exercise.  Hypertensive end-organ damage includes kidney disease. Identifiable causes of hypertension include chronic renal disease.   Diabetes  She presents for her follow-up diabetic visit. She has type 2 diabetes mellitus. Her disease course has been stable. There are no hypoglycemic associated symptoms. Pertinent negatives for hypoglycemia include no sweats. Associated symptoms include fatigue. There are no hypoglycemic complications. Symptoms are stable. There are no diabetic complications. Risk  factors for coronary artery disease include diabetes mellitus, dyslipidemia, family history, post-menopausal, sedentary lifestyle and stress. Current diabetic treatment includes oral agent (triple therapy) and insulin injections. She is compliant with treatment most of the time. Her weight is stable. She is following a generally healthy diet. She has not had a previous visit with a dietitian. She rarely participates in exercise. An ACE inhibitor/angiotensin II receptor blocker is being taken. She does not see a podiatrist.Eye exam is not current.   Arthritis  Presents for follow-up visit. She complains of pain and joint warmth. The symptoms have been stable. Associated symptoms include fatigue, pain at night and pain while resting. Pertinent negatives include no dry eyes, dysuria, Raynaud's syndrome or uveitis. Compliance with total regimen is %. Compliance with medications is %.        The following portions of the patient's history were reviewed and updated as appropriate: allergies, current medications, past family history, past medical history, past social history, past surgical history and problem list.    Review of Systems   Constitutional: Positive for fatigue.   HENT: Negative.    Eyes: Negative.    Respiratory: Negative.    Cardiovascular: Negative.    Gastrointestinal: Negative.  Negative for indigestion.   Genitourinary: Negative for dysuria.   Musculoskeletal: Positive for arthralgias, arthritis, myalgias, neck pain and neck stiffness.   Skin: Negative.    Allergic/Immunologic: Negative.    Neurological: Negative.    Hematological: Negative.    Psychiatric/Behavioral: Negative.        Objective   Physical Exam  Vitals signs reviewed.   Constitutional:       General: She is not in acute distress.     Appearance: She is well-developed. She is not diaphoretic.   HENT:      Head: Normocephalic.      Right Ear: External ear normal.      Left Ear: External ear normal.      Nose: Nose normal.    Eyes:      Pupils: Pupils are equal, round, and reactive to light.   Neck:      Musculoskeletal: Normal range of motion and neck supple.      Thyroid: No thyromegaly.      Vascular: No JVD.   Cardiovascular:      Rate and Rhythm: Normal rate and regular rhythm.      Heart sounds: No murmur. No friction rub. No gallop.    Pulmonary:      Effort: Pulmonary effort is normal. No respiratory distress.      Breath sounds: Normal breath sounds. No wheezing or rales.   Musculoskeletal: Normal range of motion.   Skin:     General: Skin is warm and dry.      Coloration: Skin is not pale.      Findings: No erythema or rash.   Neurological:      Mental Status: She is alert and oriented to person, place, and time.   Psychiatric:         Behavior: Behavior normal.         Thought Content: Thought content normal.         Judgment: Judgment normal.         Lab Results   Component Value Date    WBC 7.55 10/29/2020    WBC 7.55 10/29/2020    HGB 11.9 (L) 10/29/2020    HCT 36.0 10/29/2020    MCV 84.3 10/29/2020     10/29/2020     Lab Results   Component Value Date    GLUCOSE 61 (L) 10/29/2020    BUN 19 10/29/2020    CREATININE 0.83 10/29/2020    EGFRIFNONA 66 10/29/2020    BCR 22.9 10/29/2020    K 4.4 10/29/2020    CO2 27.3 10/29/2020    CALCIUM 9.8 10/29/2020    ALBUMIN 4.30 10/29/2020    AST 27 10/29/2020    ALT 16 10/29/2020     Lab Results   Component Value Date    HGBA1C 7.19 (H) 10/29/2020     Lab Results   Component Value Date    TSH 2.030 10/29/2020     Lab Results   Component Value Date    CHOL 127 10/29/2020    CHLPL 194 09/12/2016    TRIG 64 10/29/2020    HDL 54 10/29/2020    LDL 60 10/29/2020         Assessment/Plan   Diagnoses and all orders for this visit:    1. Essential hypertension (Primary)    2. Type 2 diabetes mellitus with hypoglycemia without coma, with long-term current use of insulin (CMS/[facility])    3. Heartburn    4. DDD (degenerative disc disease), cervical    5. Dyslipidemia    6. Decreased GFR    7.  Vitamin D deficiency               Patient's Body mass index is 16.01 kg/m². BMI is within normal parameters. No follow-up required..      Continue current medications.  Follow up in 3 months for routine follow up.  Follow up sooner for problems/concerns.  Patient verbalized understanding and agreement with plan of care.        This document has been electronically signed by CHEYENNE Lerma on February 14, 2021 17:28 CST

## 2021-02-18 NOTE — PROGRESS NOTES
Subjective   Brenda Garzon is a 80 y.o. female.     Telephone visit conducted with the patient to discuss chronic sore throat, heartburn, and difficulty swallowing.  She reports that the symptoms are worse when she goes to lay down at night.  She reports that she has been taking her Pepcid twice daily.  She had run out of her Carafate, but she reports that her daughter told her that it should be coming in the mail to her today.  She does have a history of decreased GFR in the past.  She also has a history of a multinodular goiter.  She reports that she doesn't remember seeing GI in the past, but is willing to see them if necessary due to her difficulty swallowing and uncontrolled acid reflux symptoms.  She is otherwise without any other new complaints today.    Sore Throat   This is a recurrent problem. The current episode started more than 1 month ago. The problem has been waxing and waning. Neither side of throat is experiencing more pain than the other. There has been no fever. The pain is moderate. Associated symptoms include trouble swallowing. Pertinent negatives include no abdominal pain, congestion, coughing, diarrhea, drooling, ear discharge, ear pain, headaches, hoarse voice, plugged ear sensation, neck pain, shortness of breath, stridor, swollen glands or vomiting. She has had no exposure to strep or mono. She has tried nothing for the symptoms. The treatment provided no relief.   Heartburn  She complains of dysphagia, globus sensation, heartburn, nausea and a sore throat. She reports no abdominal pain, no belching, no chest pain, no choking, no coughing, no early satiety, no hoarse voice, no stridor, no tooth decay, no water brash or no wheezing. This is a recurrent problem. The current episode started more than 1 month ago. The problem occurs frequently. The problem has been waxing and waning. The symptoms are aggravated by lying down and certain foods. Associated symptoms include fatigue. Pertinent  negatives include no anemia, melena, muscle weakness, orthopnea or weight loss.        The following portions of the patient's history were reviewed and updated as appropriate: allergies, current medications, past family history, past medical history, past social history, past surgical history and problem list.    Review of Systems   Constitutional: Positive for fatigue. Negative for unexpected weight loss.   HENT: Positive for sore throat and trouble swallowing. Negative for congestion, drooling, ear discharge, ear pain, hoarse voice and swollen glands.    Eyes: Negative.    Respiratory: Negative.  Negative for cough, choking, shortness of breath, wheezing and stridor.    Cardiovascular: Negative.  Negative for chest pain.   Gastrointestinal: Positive for dysphagia, nausea, GERD and indigestion. Negative for abdominal pain, diarrhea, melena and vomiting.   Musculoskeletal: Negative.  Negative for muscle weakness and neck pain.   Skin: Negative.    Allergic/Immunologic: Negative.    Neurological: Negative.    Hematological: Negative.    Psychiatric/Behavioral: Negative.        Objective   Physical Exam  Pulmonary:      Effort: Pulmonary effort is normal.   Neurological:      General: No focal deficit present.      Mental Status: She is oriented to person, place, and time. Mental status is at baseline.   Psychiatric:         Mood and Affect: Mood normal.         Behavior: Behavior normal.         Thought Content: Thought content normal.         Judgment: Judgment normal.           Assessment/Plan   Diagnoses and all orders for this visit:    1. Heartburn (Primary)  -     Ambulatory Referral to Gastroenterology  -     esomeprazole (nexIUM) 40 MG capsule; Take 1 capsule by mouth Every Morning Before Breakfast.  Dispense: 7 capsule; Refill: 0    2. Chronic nausea  -     Ambulatory Referral to Gastroenterology  -     esomeprazole (nexIUM) 40 MG capsule; Take 1 capsule by mouth Every Morning Before Breakfast.  Dispense: 7  capsule; Refill: 0    3. Dysphagia, unspecified type  -     Ambulatory Referral to Gastroenterology  -     esomeprazole (nexIUM) 40 MG capsule; Take 1 capsule by mouth Every Morning Before Breakfast.  Dispense: 7 capsule; Refill: 0    4. Multinodular thyroid  -     Ambulatory Referral to Endocrinology    5. Decreased GFR               This visit has been rescheduled as a phone visit to comply with patient safety concerns in accordance with CDC recommendations. Total time of discussion was 5 minutes.        Nexium once daily for 7 days.  Only using a short course due to hx of decreased kidney function.   Continue Pepcid BID and Carafate QID.  Referral to endocrinology for hx of multinodular thyroid with difficulty swallowing.  Referral to GI for evaluation of sore throat, difficulty swallowing, and uncontrolled heartburn.  Continue current medications.  Follow up as scheduled for routine follow up.  Follow up sooner for problems/concerns.  Patient verbalized understanding and agreement with plan of care.        This document has been electronically signed by CHEYENNE Lerma on February 18, 2021 09:30 CST

## 2021-02-23 PROBLEM — K21.00 GASTROESOPHAGEAL REFLUX DISEASE WITH ESOPHAGITIS WITHOUT HEMORRHAGE: Status: ACTIVE | Noted: 2021-01-01

## 2021-02-23 PROBLEM — Z80.0 FAMILY HISTORY OF GI MALIGNANCY: Status: ACTIVE | Noted: 2021-01-01

## 2021-02-23 PROBLEM — R10.10 PAIN OF UPPER ABDOMEN: Status: ACTIVE | Noted: 2021-01-01

## 2021-02-23 PROBLEM — D64.9 ANEMIA: Status: ACTIVE | Noted: 2021-01-01

## 2021-02-23 PROBLEM — Z86.010 HISTORY OF COLON POLYPS: Status: ACTIVE | Noted: 2021-01-01

## 2021-02-23 PROBLEM — R13.10 DYSPHAGIA: Status: ACTIVE | Noted: 2021-01-01

## 2021-03-10 NOTE — TELEPHONE ENCOUNTER
Ms Garzon is still having bad reflux and would like to know if you could call her in some nexum to [address]

## 2021-03-16 NOTE — ANESTHESIA POSTPROCEDURE EVALUATION
Patient: Brenda Garzon    Procedure Summary     Date: 03/16/21 Room / Location: [facility] ENDOSCOPY 1 / [facility] ENDOSCOPY    Anesthesia Start: 1006 Anesthesia Stop: 1038    Procedures:       ESOPHAGOGASTRODUODENOSCOPY WITH DILATATION (N/A )      COLONOSCOPY (N/A ) Diagnosis:       Gastroesophageal reflux disease with esophagitis without hemorrhage      Dysphagia, unspecified type      Pain of upper abdomen      History of colon polyps      Anemia, unspecified type      Family history of GI malignancy      (Gastroesophageal reflux disease with esophagitis without hemorrhage [K21.00])      (Dysphagia, unspecified type [R13.10])      (Pain of upper abdomen [R10.10])      (History of colon polyps [Z86.010])      (Anemia, unspecified type [D64.9])      (Family history of GI malignancy [Z80.0])    Surgeons: Christoph Porras MD Provider: Terrance Caro CRNA    Anesthesia Type: MAC ASA Status: 2          Anesthesia Type: MAC    Vitals  No vitals data found for the desired time range.          Post Anesthesia Care and Evaluation    Patient location during evaluation: PACU  Patient participation: complete - patient participated  Level of consciousness: awake and alert  Pain score: 0  Pain management: adequate  Airway patency: patent  Anesthetic complications: No anesthetic complications  PONV Status: none  Cardiovascular status: acceptable  Respiratory status: acceptable  Hydration status: acceptable

## 2021-03-16 NOTE — ANESTHESIA PREPROCEDURE EVALUATION
Anesthesia Evaluation     Patient summary reviewed and Nursing notes reviewed                Airway   No difficulty expected  Dental      Pulmonary - negative pulmonary ROS and normal exam   Cardiovascular - normal exam    (+) hypertension, hyperlipidemia,       Neuro/Psych- negative ROS  GI/Hepatic/Renal/Endo    (+)  hiatal hernia, GERD,  diabetes mellitus,     Musculoskeletal     (+) neck pain,   Abdominal  - normal exam   Substance History - negative use     OB/GYN          Other - negative ROS                       Anesthesia Plan    ASA 2     MAC     intravenous induction     Anesthetic plan, all risks, benefits, and alternatives have been provided, discussed and informed consent has been obtained with: patient.    Plan discussed with CRNA.

## 2021-03-23 PROBLEM — K22.2 PEPTIC STRICTURE OF ESOPHAGUS: Status: ACTIVE | Noted: 2021-01-01

## 2021-03-23 NOTE — PROGRESS NOTES
Chief Complaint   Patient presents with   • Heartburn   • Difficulty Swallowing   • Anemia   • HX of colon polyps       ENDO PROCEDURE ORDERED: EGDw/dilation gerd dysphagia, ulcer    Subjective    Brenda Garzon is a 80 y.o. female. she is here today for follow-up.    History of Present Illness    The patient was seen on recheck of her GERD, dysphagia, anemia, previous falls.  Last seen 02/23/2021.  Patient states she is getting more short of breath.  She has to stop and rest.  Her last cardiac stress test with Dr. Yousif was negative on 07/14/2017, she had an EF of 70% at that time.  She has had no other recent studies.  She did undergo EGD/colonoscopy on 03/16/2021 that showed esophageal stricture dilated to a 48-E[place]ian with Savary dilator.  She had severe esophagitis, diffuse gastritis, medium hiatal hernia.  The esophagitis was pretty severe.  Distal esophageal biopsy showed granulation tissue and fibrinopurulent exudate, ulcer.  Reactive gastric mucosa negative for Alicia's, negative for fungal organisms.  Antral biopsy showed reactive gastropathy.  Colonoscopy showed internal/external hemorrhoids with adequate prep.  Recommended repeat colonoscopy at 5 years.  Patient states her swallowing is doing better after the dilatation.  She is on Nexium and Carafate.  Bowels are moving without blood or mucus.  Weight is down 4.4 pounds since last visit.  She had previous stricture dilated in 2013 with a inflammatory type hiatal hernia.    ASSESSMENT/PLAN:  Patient with severe esophagitis, medium hiatal hernia, peptic stricture, not completely dilated.  I encouraged her to continue on the Nexium and Carafate.  Recommended a CBC, CMP, chest x-ray given her other complaints.  She may need repeat cardiac evaluation.  She was encouraged to go to the emergency room if her symptoms worsen.  We will plan follow up in 4-6 weeks, sooner if needed.  Further pending clinical course and the results of the above.      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 Internal & external hemorrhoids found.   • COLONOSCOPY N/A 3/16/2021    Procedure: COLONOSCOPY;  Surgeon: Christoph Porras MD;  Location: [facility] ENDOSCOPY;  Service: Gastroenterology;  Laterality: N/A;   • ENDOSCOPY N/A 3/16/2021    Procedure: ESOPHAGOGASTRODUODENOSCOPY WITH DILATATION;  Surgeon: Christoph Porras MD;  Location: [facility] ENDOSCOPY;  Service: Gastroenterology;  Laterality: N/A;   • LEG SURGERY Right    • SUBTOTAL HYSTERECTOMY     • UPPER GASTROINTESTINAL ENDOSCOPY  11/08/2013    Esophageal stricture present. Dilatation performed. Hiatus hernia in esophaugs. Gastritis in stomach. Biopsy taken. Normal duodenum. Biopsy taken.     Family History   Problem Relation Age of Onset   • Hypertension Mother    • Stroke Mother    • Arthritis Mother    • Osteoporosis Mother    • Heart attack Father    • Heart disease Father    • Diabetes Sister    • Arthritis Sister    • Hypertension Sister    • Hyperlipidemia Sister    • Osteoporosis Sister    • Diabetes Brother    • Heart disease Brother    • Throat cancer Daughter    • Diabetes Daughter    • Migraines Daughter      OB History    No obstetric  history on file.       Allergies   Allergen Reactions   • Codeine Other (See Comments)     Makes patient very sleepy      Social History     Socioeconomic History   • Marital status:      Spouse name: Not on file   • Number of children: Not on file   • Years of education: Not on file   • Highest education level: Not on file   Tobacco Use   • Smoking status: Never Smoker   • Smokeless tobacco: Never Used   Vaping Use   • Vaping Use: Never used   Substance and Sexual Activity   • Alcohol use: No   • Drug use: No   • Sexual activity: Defer     Current Medications:  Prior to Admission medications    Medication Sig Start Date End Date Taking? Authorizing Provider   ACCU-CHEK FASTCLIX LANCETS misc USE AS DIRECTED 9/18/18  Yes Jodie Aguilera APRN   acetaminophen (TYLENOL 8 HOUR ARTHRITIS PAIN) 650 MG 8 hr tablet Take 2 tablets by mouth Every 8 (Eight) Hours As Needed for Mild Pain . 6/27/18  Yes Jodie Aguilera APRN   aspirin 81 MG tablet Take 81 mg by mouth daily.   Yes Provider, MD Manuel   atorvastatin (LIPITOR) 20 MG tablet TAKE 1 TABLET EVERY NIGHT 11/13/19  Yes Jodie Aguilera APRN   Dulaglutide 1.5 MG/0.5ML solution pen-injector Inject 1.5 mg under the skin into the appropriate area as directed 1 (One) Time Per Week. 5/14/20  Yes Jodie Aguilera APRN   esomeprazole (nexIUM) 40 MG capsule Take 1 capsule by mouth Every Morning Before Breakfast. 2/18/21  Yes Jodie Aguilera APRN   Glucose 15 GM/32ML gel Take 32 mL by mouth 1 (One) Time As Needed (hypoglycemia). 9/8/17  Yes Jodie Aguilera APRN   glucose blood (ACCU-CHEK YUSRA PLUS) test strip 1 each by Other route See Admin Instructions. Use as instructed 12/23/19  Yes Jodie Aguilera APRN   Insulin Glargine, 1 Unit Dial, (TOUJEO) 300 UNIT/ML solution pen-injector injection Inject 15 Units under the skin into the appropriate area as directed every night at bedtime. 12/10/20  Yes Jodie Aguilera APRN   Insulin Pen Needle (PEN NEEDLES) 32G X 4 MM misc 1 each Daily. 3/1/19   "Yes Jodie Aguilera APRN   losartan (COZAAR) 50 MG tablet Take 1 tablet by mouth 2 (Two) Times a Day. 12/10/20  Yes Jodie Aguilera APRN   meclizine (ANTIVERT) 25 MG tablet Take 0.5-1 tablets by mouth 3 (Three) Times a Day As Needed for dizziness. 2/15/17  Yes Jodie Aguilera APRN   metFORMIN ER (Glucophage XR) 500 MG 24 hr tablet 1 with bkfast and 1 w supper 9/25/20  Yes Jodie Aguilera APRN   sucralfate (Carafate) 1 g tablet Take 1 tablet by mouth 4 (Four) Times a Day Before Meals & at Bedtime As Needed (acid reflux). 12/10/20  Yes Jodie Aguilera APRN   tiZANidine (ZANAFLEX) 4 MG tablet Take 0.5-1 tablets by mouth At Night As Needed for Muscle Spasms. 8/1/18  Yes Jodie Aguilera APRN   verapamil SR (CALAN-SR) 120 MG CR tablet Take 1 tablet by mouth Every Night. 12/10/20  Yes Jodie Aguilera APRN   vitamin D (ERGOCALCIFEROL) 1.25 MG (06086 UT) capsule capsule Take 1 capsule by mouth Every 7 (Seven) Days. 11/3/20  Yes Jodie Aguilera APRN   famotidine (PEPCID) 20 MG tablet Take 1 tablet by mouth 2 (Two) Times a Day. 1/30/20 3/23/21  Jodie Aguilera APRN     Review of Systems  Review of Systems   Constitutional: Positive for unexpected weight change.   HENT: Positive for trouble swallowing.    Gastrointestinal: Positive for abdominal pain and nausea. Negative for blood in stool, diarrhea and rectal pain.          Objective    /57   Pulse 86   Ht 152.4 cm (60\")   Wt 46.5 kg (102 lb 8 oz)   LMP  (LMP Unknown)   BMI 20.02 kg/m²   Physical Exam  Vitals and nursing note reviewed.   Constitutional:       General: She is not in acute distress.     Appearance: She is well-developed.   HENT:      Head: Normocephalic and atraumatic.   Eyes:      Pupils: Pupils are equal, round, and reactive to light.   Cardiovascular:      Rate and Rhythm: Normal rate and regular rhythm.      Heart sounds: Normal heart sounds.   Pulmonary:      Effort: Pulmonary effort is normal.      Breath sounds: Normal breath sounds.   Abdominal:      General: " Bowel sounds are normal. There is no distension or abdominal bruit.      Palpations: Abdomen is soft. Abdomen is not rigid. There is no shifting dullness or mass.      Tenderness: There is abdominal tenderness. There is no guarding or rebound.      Hernia: No hernia is present. There is no hernia in the ventral area.      Comments: mild   Musculoskeletal:         General: Normal range of motion.      Cervical back: Normal range of motion.   Skin:     General: Skin is warm and dry.   Neurological:      Mental Status: She is alert and oriented to person, place, and time.   Psychiatric:         Behavior: Behavior normal.         Thought Content: Thought content normal.         Judgment: Judgment normal.       Assessment/Plan      1. Gastroesophageal reflux disease with esophagitis without hemorrhage    2. Peptic stricture of esophagus    3. Dysphagia, unspecified type    4. Pain of upper abdomen    5. SOB (shortness of breath)    6. Chest pain, unspecified type    .   Diagnoses and all orders for this visit:    1. Gastroesophageal reflux disease with esophagitis without hemorrhage (Primary)  -     Case Request; Standing  -     dextrose 5 % and sodium chloride 0.45 % infusion  -     Case Request    2. Peptic stricture of esophagus  -     Case Request; Standing  -     dextrose 5 % and sodium chloride 0.45 % infusion  -     Case Request    3. Dysphagia, unspecified type  -     Case Request; Standing  -     dextrose 5 % and sodium chloride 0.45 % infusion  -     Case Request  -     CBC & Differential  -     Comprehensive Metabolic Panel  -     XR Chest PA & Lateral  -     Cancel: Manual Differential    4. Pain of upper abdomen  -     Case Request; Standing  -     dextrose 5 % and sodium chloride 0.45 % infusion  -     Case Request  -     CBC & Differential  -     Comprehensive Metabolic Panel  -     XR Chest PA & Lateral  -     Cancel: Manual Differential    5. SOB (shortness of breath)  -     CBC & Differential  -      Comprehensive Metabolic Panel  -     XR Chest PA & Lateral  -     Cancel: Manual Differential    6. Chest pain, unspecified type  -     CBC & Differential  -     Comprehensive Metabolic Panel  -     XR Chest PA & Lateral  -     Cancel: Manual Differential    Other orders  -     Follow Anesthesia Guidelines / Standing Orders; Future  -     Obtain Informed Consent; Future  -     Obtain Informed Consent; Standing  -     POC Glucose Once; Standing        Orders placed during this encounter include:  Orders Placed This Encounter   Procedures   • XR Chest PA & Lateral     Order Specific Question:   Reason for Exam:     Answer:   SOb, CHEST PAIN   • Comprehensive Metabolic Panel   • CBC Auto Differential   • Follow Anesthesia Guidelines / Standing Orders     Standing Status:   Future   • Obtain Informed Consent     Standing Status:   Future     Order Specific Question:   Informed Consent Given For     Answer:   ESOPHAGOGASTRODUODENOSCOPY WITH DILATATION   • CBC & Differential     Order Specific Question:   Manual Differential     Answer:   No       Medications prescribed:  No orders of the defined types were placed in this encounter.    Discontinued Medications       Reason for Discontinue     famotidine (PEPCID) 20 MG tablet    *Therapy completed        Requested Prescriptions      No prescriptions requested or ordered in this encounter       Review and/or summary of lab tests, radiology, procedures, medications. Review and summary of old records and obtaining of history. The risks and benefits of my recommendations, as well as other treatment options were discussed with the patient today. Questions were answered.    Follow-up: Return in about 6 weeks (around 5/4/2021), or if symptoms worsen or fail to improve, for LAB/CXR TODAY.     ESOPHAGOGASTRODUODENOSCOPY WITH DILATATION--schedule in one month (N/A)      This document has been electronically signed by Carmine Liriano PA-C on March 31, 2021 19:07 CDT      Results  for orders placed or performed in visit on 03/23/21   CBC Auto Differential    Specimen: Blood   Result Value Ref Range    WBC 8.20 3.40 - 10.80 10*3/mm3    RBC 3.99 3.77 - 5.28 10*6/mm3    Hemoglobin 10.3 (L) 12.0 - 15.9 g/dL    Hematocrit 31.7 (L) 34.0 - 46.6 %    MCV 79.4 79.0 - 97.0 fL    MCH 25.8 (L) 26.6 - 33.0 pg    MCHC 32.5 31.5 - 35.7 g/dL    RDW 14.3 12.3 - 15.4 %    RDW-SD 40.5 37.0 - 54.0 fl    MPV 13.8 (H) 6.0 - 12.0 fL    Platelets 226 140 - 450 10*3/mm3    Neutrophil % 62.8 42.7 - 76.0 %    Lymphocyte % 24.1 19.6 - 45.3 %    Monocyte % 10.0 5.0 - 12.0 %    Eosinophil % 2.3 0.3 - 6.2 %    Basophil % 0.6 0.0 - 1.5 %    Neutrophils, Absolute 5.14 1.70 - 7.00 10*3/mm3    Lymphocytes, Absolute 1.98 0.70 - 3.10 10*3/mm3    Monocytes, Absolute 0.82 0.10 - 0.90 10*3/mm3    Eosinophils, Absolute 0.19 0.00 - 0.40 10*3/mm3    Basophils, Absolute 0.05 0.00 - 0.20 10*3/mm3   Comprehensive Metabolic Panel    Specimen: Blood   Result Value Ref Range    Glucose 117 (H) 65 - 99 mg/dL    BUN 19 8 - 23 mg/dL    Creatinine 1.02 (H) 0.57 - 1.00 mg/dL    Sodium 136 136 - 145 mmol/L    Potassium 4.7 3.5 - 5.2 mmol/L    Chloride 102 98 - 107 mmol/L    CO2 22.7 22.0 - 29.0 mmol/L    Calcium 9.4 8.6 - 10.5 mg/dL    Total Protein 6.7 6.0 - 8.5 g/dL    Albumin 3.90 3.50 - 5.20 g/dL    ALT (SGPT) 13 1 - 33 U/L    AST (SGOT) 28 1 - 32 U/L    Alkaline Phosphatase 83 39 - 117 U/L    Total Bilirubin 0.4 0.0 - 1.2 mg/dL    eGFR Non African Amer 52 (L) >60 mL/min/1.73    Globulin 2.8 gm/dL    A/G Ratio 1.4 g/dL    BUN/Creatinine Ratio 18.6 7.0 - 25.0    Anion Gap 11.3 5.0 - 15.0 mmol/L   Results for orders placed or performed during the hospital encounter of 03/16/21   Tissue Pathology Exam    Specimen: A: Gastric, Antrum; Tissue    B: Esophagus, Distal; Tissue   Result Value Ref Range    Case Report       Surgical Pathology Report                         Case: [id number]                                  Authorizing Provider:   Christoph Porras MD        Collected:           03/16/2021 10:21 AM          Ordering Location:     [facility]             Received:            03/16/2021 10:54 AM                                 [facility]                                                     Pathologist:           Gustavo Lynn MD                                                           Specimens:   1) - Gastric, Antrum, antrum bx                                                                     2) - Esophagus, Distal, distal esophagus bx                                                Final Diagnosis       SEE SCANNED REPORT       POC Glucose Once    Specimen: Blood   Result Value Ref Range    Glucose 77 70 - 130 mg/dL   POC Glucose Once    Specimen: Blood   Result Value Ref Range    Glucose 105 70 - 130 mg/dL   Results for orders placed or performed in visit on 03/13/21   COVID-19,[facility], NP/OP SWAB IN UTM/VTM/[facility] TRANSPORT MEDIA,24 HR TAT - Swab, Nasopharynx    Specimen: Nasopharynx; Swab   Result Value Ref Range    COVID19 Not Detected Not Detected - Ref. Range   Results for orders placed or performed in visit on 10/29/20   CBC Auto Differential    Specimen: Blood   Result Value Ref Range    WBC 7.55 3.40 - 10.80 10*3/mm3    RBC 4.27 3.77 - 5.28 10*6/mm3    Hemoglobin 11.9 (L) 12.0 - 15.9 g/dL    Hematocrit 36.0 34.0 - 46.6 %    MCV 84.3 79.0 - 97.0 fL    MCH 27.9 26.6 - 33.0 pg    MCHC 33.1 31.5 - 35.7 g/dL    RDW 14.0 12.3 - 15.4 %    RDW-SD 43.1 37.0 - 54.0 fl    MPV 13.6 (H) 6.0 - 12.0 fL    Platelets 185 140 - 450 10*3/mm3    Neutrophil % 61.5 42.7 - 76.0 %    Lymphocyte % 27.5 19.6 - 45.3 %    Monocyte % 8.2 5.0 - 12.0 %    Eosinophil % 1.7 0.3 - 6.2 %    Basophil % 0.8 0.0 - 1.5 %    Immature Grans % 0.3 0.0 - 0.5 %    Neutrophils, Absolute 4.64 1.70 - 7.00 10*3/mm3    Lymphocytes, Absolute 2.08 0.70 - 3.10 10*3/mm3    Monocytes, Absolute 0.62 0.10 - 0.90 10*3/mm3    Eosinophils, Absolute 0.13  0.00 - 0.40 10*3/mm3    Basophils, Absolute 0.06 0.00 - 0.20 10*3/mm3    Immature Grans, Absolute 0.02 0.00 - 0.05 10*3/mm3    nRBC 0.0 0.0 - 0.2 /100 WBC    Differential Type      WBC 7.55 3.40 - 10.80 10*3/mm3   Microalbumin / Creatinine Urine Ratio - Urine, Clean Catch    Specimen: Urine, Clean Catch   Result Value Ref Range    Microalbumin/Creatinine Ratio 32.2 mg/g    Creatinine, Urine 52.8 mg/dL    Microalbumin, Urine 1.7 mg/dL   Vitamin D 25 Hydroxy    Specimen: Blood   Result Value Ref Range    25 Hydroxy, Vitamin D 28.0 (L) 30.0 - 100.0 ng/ml   TSH    Specimen: Blood   Result Value Ref Range    TSH 2.030 0.270 - 4.200 uIU/mL   Hemoglobin A1c    Specimen: Blood   Result Value Ref Range    Hemoglobin A1C 7.19 (H) 4.80 - 5.60 %   Lipid Panel    Specimen: Blood   Result Value Ref Range    Total Cholesterol 127 0 - 200 mg/dL    Triglycerides 64 0 - 150 mg/dL    HDL Cholesterol 54 40 - 60 mg/dL    LDL Cholesterol  60 0 - 100 mg/dL    VLDL Cholesterol 13 5 - 40 mg/dL    LDL/HDL Ratio 1.11    Comprehensive Metabolic Panel    Specimen: Blood   Result Value Ref Range    Glucose 61 (L) 65 - 99 mg/dL    BUN 19 8 - 23 mg/dL    Creatinine 0.83 0.57 - 1.00 mg/dL    Sodium 140 136 - 145 mmol/L    Potassium 4.4 3.5 - 5.2 mmol/L    Chloride 105 98 - 107 mmol/L    CO2 27.3 22.0 - 29.0 mmol/L    Calcium 9.8 8.6 - 10.5 mg/dL    Total Protein 7.2 6.0 - 8.5 g/dL    Albumin 4.30 3.50 - 5.20 g/dL    ALT (SGPT) 16 1 - 33 U/L    AST (SGOT) 27 1 - 32 U/L    Alkaline Phosphatase 92 39 - 117 U/L    Total Bilirubin 0.4 0.0 - 1.2 mg/dL    eGFR Non African Amer 66 >60 mL/min/1.73    Globulin 2.9 gm/dL    A/G Ratio 1.5 g/dL    BUN/Creatinine Ratio 22.9 7.0 - 25.0    Anion Gap 7.7 5.0 - 15.0 mmol/L     *Note: Due to a large number of results and/or encounters for the requested time period, some results have not been displayed. A complete set of results can be found in Results Review.       Some portions of this note have been dictated using  voice recognition software and may contain errors and/or omissions.

## 2021-03-23 NOTE — PATIENT INSTRUCTIONS
MyPlate from USDA    MyPlate is an outline of a general healthy diet based on the 2010 Dietary Guidelines for Americans, from the [place] Department of Agriculture (USDA). It sets guidelines for how much food you should eat from each food group based on your age, sex, and level of physical activity.  What are tips for following MyPlate?  To follow MyPlate recommendations:  · Eat a wide variety of fruits and vegetables, grains, and protein foods.  · Serve smaller portions and eat less food throughout the day.  · Limit portion sizes to avoid overeating.  · Enjoy your food.  · Get at least 150 minutes of exercise every week. This is about 30 minutes each day, 5 or more days per week.  It can be difficult to have every meal look like MyPlate. Think about MyPlate as eating guidelines for an entire day, rather than each individual meal.  Fruits and vegetables  · Make half of your plate fruits and vegetables.  · Eat many different colors of fruits and vegetables each day.  · For a 2,000 calorie daily food plan, eat:  ? 2½ cups of vegetables every day.  ? 2 cups of fruit every day.  · 1 cup is equal to:  ? 1 cup raw or cooked vegetables.  ? 1 cup raw fruit.  ? 1 medium-sized orange, apple, or banana.  ? 1 cup 100% fruit or vegetable juice.  ? 2 cups raw leafy greens, such as lettuce, spinach, or kale.  ? ½ cup dried fruit.  Grains  · One fourth of your plate should be grains.  · Make at least half of the grains you eat each day whole grains.  · For a 2,000 calorie daily food plan, eat 6 oz of grains every day.  · 1 oz is equal to:  ? 1 slice bread.  ? 1 cup cereal.  ? ½ cup cooked rice, cereal, or pasta.  Protein  · One fourth of your plate should be protein.  · Eat a wide variety of protein foods, including meat, poultry, fish, eggs, beans, nuts, and tofu.  · For a 2,000 calorie daily food plan, eat 5½ oz of protein every day.  · 1 oz is equal to:  ? 1 oz meat, poultry, or fish.  ? ¼ cup cooked beans.  ? 1 egg.  ? ½ oz nuts  or seeds.  ? 1 Tbsp peanut butter.  Dairy  · Drink fat-free or low-fat (1%) milk.  · Eat or drink dairy as a side to meals.  · For a 2,000 calorie daily food plan, eat or drink 3 cups of dairy every day.  · 1 cup is equal to:  ? 1 cup milk, yogurt, cottage cheese, or soy milk (soy beverage).  ? 2 oz processed cheese.  ? 1½ oz natural cheese.  Fats, oils, salt, and sugars  · Only small amounts of oils are recommended.  · Avoid foods that are high in calories and low in nutritional value (empty calories), like foods high in fat or added sugars.  · Choose foods that are low in salt (sodium). Choose foods that have less than 140 milligrams (mg) of sodium per serving.  · Drink water instead of sugary drinks. Drink enough water each day to keep your urine pale yellow.  Where to find support  · Work with your health care provider or a nutrition specialist (dietitian) to develop a customized eating plan that is right for you.  · Download an raphael (mobile application) to help you track your daily food intake.  Where to find more information  · Go to ChooseMyPlate.gov for more information.  Summary  · MyPlate is a general guideline for healthy eating from the USDA. It is based on the 2010 Dietary Guidelines for Americans.  · In general, fruits and vegetables should take up ½ of your plate, grains should take up ¼ of your plate, and protein should take up ¼ of your plate.  This information is not intended to replace advice given to you by your health care provider. Make sure you discuss any questions you have with your health care provider.  Document Revised: 05/21/2020 Document Reviewed: 03/19/2018  Elsevier Patient Education © 2021 [employer]  BMI for Adults  What is BMI?  Body mass index (BMI) is a number that is calculated from a person's weight and height. BMI can help estimate how much of a person's weight is composed of fat. BMI does not measure body fat directly. Rather, it is an alternative to procedures that  "directly measure body fat, which can be difficult and expensive.  BMI can help identify people who may be at higher risk for certain medical problems.  What are BMI measurements used for?  BMI is used as a screening tool to identify possible weight problems. It helps determine whether a person is obese, overweight, a healthy weight, or underweight.  BMI is useful for:  · Identifying a weight problem that may be related to a medical condition or may increase the risk for medical problems.  · Promoting changes, such as changes in diet and exercise, to help reach a healthy weight. BMI screening can be repeated to see if these changes are working.  How is BMI calculated?  BMI involves measuring your weight in relation to your height. Both height and weight are measured, and the BMI is calculated from those numbers. This can be done either in English ([place] or metric measurements. Note that charts and online BMI calculators are available to help you find your BMI quickly and easily without having to do these calculations yourself.  To calculate your BMI in English ([place] measurements:    1. Measure your weight in pounds (lb).  2. Multiply the number of pounds by 703.  ? For example, for a person who weighs 180 lb, multiply that number by 703, which equals 126,540.  3. Measure your height in inches. Then multiply that number by itself to get a measurement called \"inches squared.\"  ? For example, for a person who is 70 inches tall, the \"inches squared\" measurement is 70 inches x 70 inches, which equals 4,900 inches squared.  4. Divide the total from step 2 (number of lb x 703) by the total from step 3 (inches squared): 126,540 ÷ 4,900 = 25.8. This is your BMI.  To calculate your BMI in metric measurements:  1. Measure your weight in kilograms (kg).  2. Measure your height in meters (m). Then multiply that number by itself to get a measurement called \"meters squared.\"  ? For example, for a person who is 1.75 m tall, the " "\"meters squared\" measurement is 1.75 m x 1.75 m, which is equal to 3.1 meters squared.  3. Divide the number of kilograms (your weight) by the meters squared number. In this example: 70 ÷ 3.1 = 22.6. This is your BMI.  What do the results mean?  BMI charts are used to identify whether you are underweight, normal weight, overweight, or obese. The following guidelines will be used:  · Underweight: BMI less than 18.5.  · Normal weight: BMI between 18.5 and 24.9.  · Overweight: BMI between 25 and 29.9.  · Obese: BMI of 30 or above.  Keep these notes in mind:  · Weight includes both fat and muscle, so someone with a muscular build, such as an athlete, may have a BMI that is higher than 24.9. In cases like these, BMI is not an accurate measure of body fat.  · To determine if excess body fat is the cause of a BMI of 25 or higher, further assessments may need to be done by a health care provider.  · BMI is usually interpreted in the same way for men and women.  Where to find more information  For more information about BMI, including tools to quickly calculate your BMI, go to these websites:  · Centers for Disease Control and Prevention: www.cdc.gov  · American Heart Association: www.heart.org  · National Heart, Lung, and Blood [place]: www.nhlbi.nih.gov  Summary  · Body mass index (BMI) is a number that is calculated from a person's weight and height.  · BMI may help estimate how much of a person's weight is composed of fat. BMI can help identify those who may be at higher risk for certain medical problems.  · BMI can be measured using English measurements or metric measurements.  · BMI charts are used to identify whether you are underweight, normal weight, overweight, or obese.  This information is not intended to replace advice given to you by your health care provider. Make sure you discuss any questions you have with your health care provider.  Document Revised: 09/09/2020 Document Reviewed: 07/17/2020  Andrea " Patient Education © 2021 [employer]vier Inc.

## 2021-03-30 NOTE — TELEPHONE ENCOUNTER
----- Message from Carmine Liriano PA-C sent at 3/26/2021  2:00 PM CDT -----  Renal function stable. Anemia worse. Recommend take iron. CXR shows poss early CHF, keep endo  ----- Message -----  From: Margoth Petersen MA  Sent: 3/26/2021   1:58 PM CDT  To: Carmine Liriano PA-C    Patient called for x ray and lab results. The lab was unable to add on the iron.....

## 2021-03-30 NOTE — TELEPHONE ENCOUNTER
Patient has been contacted and made aware of her results and recommendations per Carmine Liriano MS YOVANA. Renal function stable. Anemia worse. Recommend take iron. CXR shows poss early CHF, keep endo   Patient voiced understanding.

## 2021-04-30 NOTE — TELEPHONE ENCOUNTER
Pharmacy called and is needing to know how many times a day that she needs to test for her prescription that they have. Please call pharmacy

## 2021-05-10 NOTE — ANESTHESIA POSTPROCEDURE EVALUATION
Patient: Brenda Garzon    Procedure Summary     Date: 05/10/21 Room / Location: [facility] ENDOSCOPY 1 / [facility] ENDOSCOPY    Anesthesia Start: 1040 Anesthesia Stop: 1054    Procedure: ESOPHAGOGASTRODUODENOSCOPY WITH DILATATION--schedule in one month (N/A ) Diagnosis:       Gastroesophageal reflux disease with esophagitis without hemorrhage      Peptic stricture of esophagus      Dysphagia, unspecified type      Pain of upper abdomen      (Gastroesophageal reflux disease with esophagitis without hemorrhage [K21.00])      (Peptic stricture of esophagus [K22.2])      (Dysphagia, unspecified type [R13.10])      (Pain of upper abdomen [R10.10])    Surgeons: Christoph Porras MD Provider: Saray Fuller CRNA    Anesthesia Type: MAC ASA Status: 3          Anesthesia Type: MAC    Vitals  No vitals data found for the desired time range.          Post Anesthesia Care and Evaluation    Patient location during evaluation: bedside  Patient participation: complete - patient participated  Level of consciousness: sleepy but conscious  Pain score: 0  Pain management: adequate  Airway patency: patent  Anesthetic complications: No anesthetic complications  PONV Status: none  Cardiovascular status: acceptable  Respiratory status: acceptable  Hydration status: acceptable    Comments: -------------------------              05/10/21                    0911        -------------------------   BP:         133/73        Pulse:        100         Resp:         18          Temp:   97 °F (36.1 °C)   SpO2:         98%        -------------------------

## 2021-05-10 NOTE — H&P
No chief complaint on file.      ENDO PROCEDURE ORDERED: EGDw/dilation gerd dysphagia, ulcer    Subjective    Brenda Garzon is a 80 y.o. female. she is here today for follow-up.    History of Present Illness    The patient was seen on recheck of her GERD, dysphagia, anemia, previous falls.  Last seen 02/23/2021.  Patient states she is getting more short of breath.  She has to stop and rest.  Her last cardiac stress test with Dr. Yousif was negative on 07/14/2017, she had an EF of 70% at that time.  She has had no other recent studies.  She did undergo EGD/colonoscopy on 03/16/2021 that showed esophageal stricture dilated to a 48-Georgian with Savary dilator.  She had severe esophagitis, diffuse gastritis, medium hiatal hernia.  The esophagitis was pretty severe.  Distal esophageal biopsy showed granulation tissue and fibrinopurulent exudate, ulcer.  Reactive gastric mucosa negative for Alicia's, negative for fungal organisms.  Antral biopsy showed reactive gastropathy.  Colonoscopy showed internal/external hemorrhoids with adequate prep.  Recommended repeat colonoscopy at 5 years.  Patient states her swallowing is doing better after the dilatation.  She is on Nexium and Carafate.  Bowels are moving without blood or mucus.  Weight is down 4.4 pounds since last visit.  She had previous stricture dilated in 2013 with a inflammatory type hiatal hernia.    ASSESSMENT/PLAN:  Patient with severe esophagitis, medium hiatal hernia, peptic stricture, not completely dilated.  I encouraged her to continue on the Nexium and Carafate.  Recommended a CBC, CMP, chest x-ray given her other complaints.  She may need repeat cardiac evaluation.  She was encouraged to go to the emergency room if her symptoms worsen.  We will plan follow up in 4-6 weeks, sooner if needed.  Further pending clinical course and the results of the above.      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 Internal & external hemorrhoids found.   • COLONOSCOPY N/A 3/16/2021    Procedure: COLONOSCOPY;  Surgeon: Christoph Porras MD;  Location: [facility] ENDOSCOPY;  Service: Gastroenterology;  Laterality: N/A;   • ENDOSCOPY N/A 3/16/2021    Procedure: ESOPHAGOGASTRODUODENOSCOPY WITH DILATATION;  Surgeon: Christoph Porras MD;  Location: [facility] ENDOSCOPY;  Service: Gastroenterology;  Laterality: N/A;   • LEG SURGERY Right    • SUBTOTAL HYSTERECTOMY     • UPPER GASTROINTESTINAL ENDOSCOPY  11/08/2013    Esophageal stricture present. Dilatation performed. Hiatus hernia in esophaugs. Gastritis in stomach. Biopsy taken. Normal duodenum. Biopsy taken.     Family History   Problem Relation Age of Onset   • Hypertension Mother    • Stroke Mother    • Arthritis Mother    • Osteoporosis Mother    • Heart attack Father    • Heart disease Father    • Diabetes Sister    • Arthritis Sister    • Hypertension Sister    • Hyperlipidemia Sister    • Osteoporosis Sister    • Diabetes Brother    • Heart disease Brother    • Throat cancer Daughter    • Diabetes Daughter    • Migraines Daughter      OB History    No obstetric history on file.       Allergies   Allergen Reactions   • Codeine Other (See Comments)      Makes patient very sleepy      Social History     Socioeconomic History   • Marital status:      Spouse name: Not on file   • Number of children: Not on file   • Years of education: Not on file   • Highest education level: Not on file   Tobacco Use   • Smoking status: Never Smoker   • Smokeless tobacco: Never Used   Vaping Use   • Vaping Use: Never used   Substance and Sexual Activity   • Alcohol use: No   • Drug use: No   • Sexual activity: Defer     Current Medications:  Prior to Admission medications    Medication Sig Start Date End Date Taking? Authorizing Provider   ACCU-CHEK FASTCLIX LANCETS misc USE AS DIRECTED 9/18/18  Yes Jodie Aguilera APRN   acetaminophen (TYLENOL 8 HOUR ARTHRITIS PAIN) 650 MG 8 hr tablet Take 2 tablets by mouth Every 8 (Eight) Hours As Needed for Mild Pain . 6/27/18  Yes Jodie Aguilera APRN   aspirin 81 MG tablet Take 81 mg by mouth daily.   Yes Provider, MD Manuel   atorvastatin (LIPITOR) 20 MG tablet TAKE 1 TABLET EVERY NIGHT 11/13/19  Yes Jodie Aguilera APRN   Dulaglutide 1.5 MG/0.5ML solution pen-injector Inject 1.5 mg under the skin into the appropriate area as directed 1 (One) Time Per Week. 5/14/20  Yes Jodie Aguilera APRN   esomeprazole (nexIUM) 40 MG capsule Take 1 capsule by mouth Every Morning Before Breakfast. 2/18/21  Yes Jodie Aguilera APRN   Glucose 15 GM/32ML gel Take 32 mL by mouth 1 (One) Time As Needed (hypoglycemia). 9/8/17  Yes Jodie Aguilera APRN   glucose blood (ACCU-CHEK YUSRA PLUS) test strip 1 each by Other route See Admin Instructions. Use as instructed 12/23/19  Yes Jodie Aguilera APRN   Insulin Glargine, 1 Unit Dial, (TOUJEO) 300 UNIT/ML solution pen-injector injection Inject 15 Units under the skin into the appropriate area as directed every night at bedtime. 12/10/20  Yes Jodie Aguilera APRN   Insulin Pen Needle (PEN NEEDLES) 32G X 4 MM misc 1 each Daily. 3/1/19  Yes Jodie Aguilera APRN   losartan (COZAAR) 50 MG tablet Take 1 tablet by mouth 2 (Two)  "Times a Day. 12/10/20  Yes Jodie Aguilera APRN   meclizine (ANTIVERT) 25 MG tablet Take 0.5-1 tablets by mouth 3 (Three) Times a Day As Needed for dizziness. 2/15/17  Yes Jodie Aguilera APRN   metFORMIN ER (Glucophage XR) 500 MG 24 hr tablet 1 with bkfast and 1 w supper 9/25/20  Yes Jodie Aguilera APRN   sucralfate (Carafate) 1 g tablet Take 1 tablet by mouth 4 (Four) Times a Day Before Meals & at Bedtime As Needed (acid reflux). 12/10/20  Yes Jodie Aguilera APRN   tiZANidine (ZANAFLEX) 4 MG tablet Take 0.5-1 tablets by mouth At Night As Needed for Muscle Spasms. 8/1/18  Yes Jodie Aguilera APRN   verapamil SR (CALAN-SR) 120 MG CR tablet Take 1 tablet by mouth Every Night. 12/10/20  Yes Jodie Aguilera APRN   vitamin D (ERGOCALCIFEROL) 1.25 MG (10817 UT) capsule capsule Take 1 capsule by mouth Every 7 (Seven) Days. 11/3/20  Yes Jodie Aguilera APRN   famotidine (PEPCID) 20 MG tablet Take 1 tablet by mouth 2 (Two) Times a Day. 1/30/20 3/23/21  Jodie Aguilera APRN     Review of Systems  Review of Systems   Constitutional: Positive for unexpected weight change.   HENT: Positive for trouble swallowing.    Gastrointestinal: Positive for abdominal pain and nausea. Negative for blood in stool, diarrhea and rectal pain.          Objective    /73 (BP Location: Left arm, Patient Position: Lying)   Pulse 100   Temp 97 °F (36.1 °C) (Temporal)   Resp 18   Ht 142.2 cm (56\")   Wt 46.7 kg (103 lb)   LMP  (LMP Unknown)   SpO2 98%   BMI 23.09 kg/m²   Physical Exam  Vitals and nursing note reviewed.   Constitutional:       General: She is not in acute distress.     Appearance: She is well-developed.   HENT:      Head: Normocephalic and atraumatic.   Eyes:      Pupils: Pupils are equal, round, and reactive to light.   Cardiovascular:      Rate and Rhythm: Normal rate and regular rhythm.      Heart sounds: Normal heart sounds.   Pulmonary:      Effort: Pulmonary effort is normal.      Breath sounds: Normal breath sounds.   Abdominal: "      General: Bowel sounds are normal. There is no distension or abdominal bruit.      Palpations: Abdomen is soft. Abdomen is not rigid. There is no shifting dullness or mass.      Tenderness: There is abdominal tenderness. There is no guarding or rebound.      Hernia: No hernia is present. There is no hernia in the ventral area.      Comments: mild   Musculoskeletal:         General: Normal range of motion.      Cervical back: Normal range of motion.   Skin:     General: Skin is warm and dry.   Neurological:      Mental Status: She is alert and oriented to person, place, and time.   Psychiatric:         Behavior: Behavior normal.         Thought Content: Thought content normal.         Judgment: Judgment normal.       Assessment/Plan      1. Gastroesophageal reflux disease with esophagitis without hemorrhage    2. Peptic stricture of esophagus    3. Dysphagia, unspecified type    4. Pain of upper abdomen    .   Diagnoses and all orders for this visit:    1. Gastroesophageal reflux disease with esophagitis without hemorrhage  -     dextrose 5 % and sodium chloride 0.45 % infusion    2. Peptic stricture of esophagus  -     dextrose 5 % and sodium chloride 0.45 % infusion    3. Dysphagia, unspecified type  -     dextrose 5 % and sodium chloride 0.45 % infusion    4. Pain of upper abdomen  -     dextrose 5 % and sodium chloride 0.45 % infusion    Other orders  -     Insert Peripheral IV; Standing  -     POC Glucose Once; Standing  -     Obtain Informed Consent; Standing  -     Insert Peripheral IV  -     POC Glucose Once  -     Obtain Informed Consent        Orders placed during this encounter include:  Orders Placed This Encounter   Procedures   • Obtain Informed Consent     Standing Status:   Standing     Number of Occurrences:   1     Order Specific Question:   Informed Consent Given For     Answer:   ESOPHAGOGASTRODUODENOSCOPY WITH DILATATION   • POC Glucose Once     Prior to Procedure on ALL Diabetic Patients      Standing Status:   Standing     Number of Occurrences:   1   • Insert Peripheral IV     Standing Status:   Standing     Number of Occurrences:   1       Medications prescribed:  New Medications Ordered This Visit   Medications   • dextrose 5 % and sodium chloride 0.45 % infusion     Discontinued Medications       Reason for Discontinue     famotidine (PEPCID) 20 MG tablet    *Therapy completed        Requested Prescriptions      No prescriptions requested or ordered in this encounter       Review and/or summary of lab tests, radiology, procedures, medications. Review and summary of old records and obtaining of history. The risks and benefits of my recommendations, as well as other treatment options were discussed with the patient today. Questions were answered.    Follow-up: No follow-ups on file.     ESOPHAGOGASTRODUODENOSCOPY WITH DILATATION--schedule in one month (N/A)      This document has been electronically signed by Christoph Porras MD on May 10, 2021 09:43 CDT      Results for orders placed or performed in visit on 05/07/21   COVID-19, [facility], NP SWAB IN TRANSPORT MEDIA 8-10 HR TAT - Swab, Nasopharynx    Specimen: Nasopharynx; Swab   Result Value Ref Range    COVID19 Not Detected Not Detected - Ref. Range   Results for orders placed or performed in visit on 04/11/21   COVID-19, [facility], NP SWAB IN TRANSPORT MEDIA 8-10 HR TAT - Swab, Nasopharynx    Specimen: Nasopharynx; Swab   Result Value Ref Range    COVID19 Detected (C) Not Detected - Ref. Range   Results for orders placed or performed in visit on 03/23/21   CBC Auto Differential    Specimen: Blood   Result Value Ref Range    WBC 8.20 3.40 - 10.80 10*3/mm3    RBC 3.99 3.77 - 5.28 10*6/mm3    Hemoglobin 10.3 (L) 12.0 - 15.9 g/dL    Hematocrit 31.7 (L) 34.0 - 46.6 %    MCV 79.4 79.0 - 97.0 fL    MCH 25.8 (L) 26.6 - 33.0 pg    MCHC 32.5 31.5 - 35.7 g/dL    RDW 14.3 12.3 - 15.4 %    RDW-SD 40.5 37.0 - 54.0 fl    MPV 13.8 (H) 6.0 - 12.0 fL     Platelets 226 140 - 450 10*3/mm3    Neutrophil % 62.8 42.7 - 76.0 %    Lymphocyte % 24.1 19.6 - 45.3 %    Monocyte % 10.0 5.0 - 12.0 %    Eosinophil % 2.3 0.3 - 6.2 %    Basophil % 0.6 0.0 - 1.5 %    Neutrophils, Absolute 5.14 1.70 - 7.00 10*3/mm3    Lymphocytes, Absolute 1.98 0.70 - 3.10 10*3/mm3    Monocytes, Absolute 0.82 0.10 - 0.90 10*3/mm3    Eosinophils, Absolute 0.19 0.00 - 0.40 10*3/mm3    Basophils, Absolute 0.05 0.00 - 0.20 10*3/mm3   Comprehensive Metabolic Panel    Specimen: Blood   Result Value Ref Range    Glucose 117 (H) 65 - 99 mg/dL    BUN 19 8 - 23 mg/dL    Creatinine 1.02 (H) 0.57 - 1.00 mg/dL    Sodium 136 136 - 145 mmol/L    Potassium 4.7 3.5 - 5.2 mmol/L    Chloride 102 98 - 107 mmol/L    CO2 22.7 22.0 - 29.0 mmol/L    Calcium 9.4 8.6 - 10.5 mg/dL    Total Protein 6.7 6.0 - 8.5 g/dL    Albumin 3.90 3.50 - 5.20 g/dL    ALT (SGPT) 13 1 - 33 U/L    AST (SGOT) 28 1 - 32 U/L    Alkaline Phosphatase 83 39 - 117 U/L    Total Bilirubin 0.4 0.0 - 1.2 mg/dL    eGFR Non African Amer 52 (L) >60 mL/min/1.73    Globulin 2.8 gm/dL    A/G Ratio 1.4 g/dL    BUN/Creatinine Ratio 18.6 7.0 - 25.0    Anion Gap 11.3 5.0 - 15.0 mmol/L   Results for orders placed or performed during the hospital encounter of 03/16/21   Tissue Pathology Exam    Specimen: A: Gastric, Antrum; Tissue    B: Esophagus, Distal; Tissue   Result Value Ref Range    Case Report       Surgical Pathology Report                         Case: [id number]                                  Authorizing Provider:  Christoph Porras MD        Collected:           03/16/2021 10:21 AM          Ordering Location:     [facility]             Received:            03/16/2021 10:54 AM                                 [facility]                                                     Pathologist:           Gustavo Lynn MD                                                           Specimens:   1) - Gastric, Antrum, antrum bx                                                                      2) - Esophagus, Distal, distal esophagus bx                                                Final Diagnosis       SEE SCANNED REPORT       POC Glucose Once    Specimen: Blood   Result Value Ref Range    Glucose 77 70 - 130 mg/dL   POC Glucose Once    Specimen: Blood   Result Value Ref Range    Glucose 105 70 - 130 mg/dL   Results for orders placed or performed in visit on 03/13/21   COVID-19,[facility], NP/OP SWAB IN UTM/VTM/[facility] TRANSPORT MEDIA,24 HR TAT - Swab, Nasopharynx    Specimen: Nasopharynx; Swab   Result Value Ref Range    COVID19 Not Detected Not Detected - Ref. Range   Results for orders placed or performed in visit on 10/29/20   CBC Auto Differential    Specimen: Blood   Result Value Ref Range    WBC 7.55 3.40 - 10.80 10*3/mm3    RBC 4.27 3.77 - 5.28 10*6/mm3    Hemoglobin 11.9 (L) 12.0 - 15.9 g/dL    Hematocrit 36.0 34.0 - 46.6 %    MCV 84.3 79.0 - 97.0 fL    MCH 27.9 26.6 - 33.0 pg    MCHC 33.1 31.5 - 35.7 g/dL    RDW 14.0 12.3 - 15.4 %    RDW-SD 43.1 37.0 - 54.0 fl    MPV 13.6 (H) 6.0 - 12.0 fL    Platelets 185 140 - 450 10*3/mm3    Neutrophil % 61.5 42.7 - 76.0 %    Lymphocyte % 27.5 19.6 - 45.3 %    Monocyte % 8.2 5.0 - 12.0 %    Eosinophil % 1.7 0.3 - 6.2 %    Basophil % 0.8 0.0 - 1.5 %    Immature Grans % 0.3 0.0 - 0.5 %    Neutrophils, Absolute 4.64 1.70 - 7.00 10*3/mm3    Lymphocytes, Absolute 2.08 0.70 - 3.10 10*3/mm3    Monocytes, Absolute 0.62 0.10 - 0.90 10*3/mm3    Eosinophils, Absolute 0.13 0.00 - 0.40 10*3/mm3    Basophils, Absolute 0.06 0.00 - 0.20 10*3/mm3    Immature Grans, Absolute 0.02 0.00 - 0.05 10*3/mm3    nRBC 0.0 0.0 - 0.2 /100 WBC    Differential Type      WBC 7.55 3.40 - 10.80 10*3/mm3   Microalbumin / Creatinine Urine Ratio - Urine, Clean Catch    Specimen: Urine, Clean Catch   Result Value Ref Range    Microalbumin/Creatinine Ratio 32.2 mg/g    Creatinine, Urine 52.8 mg/dL    Microalbumin, Urine 1.7 mg/dL   Vitamin  D 25 Hydroxy    Specimen: Blood   Result Value Ref Range    25 Hydroxy, Vitamin D 28.0 (L) 30.0 - 100.0 ng/ml   TSH    Specimen: Blood   Result Value Ref Range    TSH 2.030 0.270 - 4.200 uIU/mL   Hemoglobin A1c    Specimen: Blood   Result Value Ref Range    Hemoglobin A1C 7.19 (H) 4.80 - 5.60 %   Lipid Panel    Specimen: Blood   Result Value Ref Range    Total Cholesterol 127 0 - 200 mg/dL    Triglycerides 64 0 - 150 mg/dL    HDL Cholesterol 54 40 - 60 mg/dL    LDL Cholesterol  60 0 - 100 mg/dL    VLDL Cholesterol 13 5 - 40 mg/dL    LDL/HDL Ratio 1.11    Comprehensive Metabolic Panel    Specimen: Blood   Result Value Ref Range    Glucose 61 (L) 65 - 99 mg/dL    BUN 19 8 - 23 mg/dL    Creatinine 0.83 0.57 - 1.00 mg/dL    Sodium 140 136 - 145 mmol/L    Potassium 4.4 3.5 - 5.2 mmol/L    Chloride 105 98 - 107 mmol/L    CO2 27.3 22.0 - 29.0 mmol/L    Calcium 9.8 8.6 - 10.5 mg/dL    Total Protein 7.2 6.0 - 8.5 g/dL    Albumin 4.30 3.50 - 5.20 g/dL    ALT (SGPT) 16 1 - 33 U/L    AST (SGOT) 27 1 - 32 U/L    Alkaline Phosphatase 92 39 - 117 U/L    Total Bilirubin 0.4 0.0 - 1.2 mg/dL    eGFR Non African Amer 66 >60 mL/min/1.73    Globulin 2.9 gm/dL    A/G Ratio 1.5 g/dL    BUN/Creatinine Ratio 22.9 7.0 - 25.0    Anion Gap 7.7 5.0 - 15.0 mmol/L     *Note: Due to a large number of results and/or encounters for the requested time period, some results have not been displayed. A complete set of results can be found in Results Review.       Some portions of this note have been dictated using voice recognition software and may contain errors and/or omissions.

## 2021-05-10 NOTE — ANESTHESIA PREPROCEDURE EVALUATION
Anesthesia Evaluation     Patient summary reviewed and Nursing notes reviewed   no history of anesthetic complications:  NPO Solid Status: > 8 hours  NPO Liquid Status: > 2 hours           Airway   Mallampati: II  TM distance: >3 FB  Neck ROM: full  No difficulty expected  Dental    (+) upper dentures    Pulmonary - negative pulmonary ROS and normal exam   Cardiovascular - normal exam    ECG reviewed    (+) hypertension well controlled less than 2 medications, hyperlipidemia,       Neuro/Psych- negative ROS  (-) seizures, CVA  GI/Hepatic/Renal/Endo    (+)  hiatal hernia, GERD,  diabetes mellitus type 2 well controlled using insulin,     Musculoskeletal     (+) neck pain,   Abdominal  - normal exam   Substance History - negative use     OB/GYN          Other - negative ROS                       Anesthesia Plan    ASA 3     MAC     intravenous induction     Anesthetic plan, all risks, benefits, and alternatives have been provided, discussed and informed consent has been obtained with: patient.    Plan discussed with CRNA.

## 2021-05-11 NOTE — TELEPHONE ENCOUNTER
What mail order pharmacy?  There are multiple mail order pharmacies selected on her profile.  The medications would have went to [facility] because that is what was selected on the refill.  I didn't change the pharmacy.

## 2021-05-11 NOTE — TELEPHONE ENCOUNTER
I spoke with Mrs Garzon's daughter Jennifer the last time you sent refills in they went to [facility] but they need to be sent to to the mail order pharmacy

## 2021-05-11 NOTE — TELEPHONE ENCOUNTER
Ms. Robert would like a call back regarding Ms. Gutierrez. She is needing to talk to you TODAY. Says it is important [phone] call back today

## 2021-05-28 PROBLEM — D50.0 ANEMIA, BLOOD LOSS: Status: ACTIVE | Noted: 2021-01-01

## 2021-06-10 NOTE — TELEPHONE ENCOUNTER
----- Message from CHEYENNE Lerma sent at 6/9/2021  8:38 AM CDT -----  A1C is well controlled.  Kidney function has improved slightly to 55%, but is still below what is considered normal.  Continue current medications.

## 2021-06-20 NOTE — PROGRESS NOTES
Subjective   Brenda Garzon is a 80 y.o. female.     She presents today for her routine follow up on chronic medical problems.  Her BP is well controlled today in the office.  She reports her blood sugars have been fairly well controlled.  No recent episodes of hypoglycemia.  She is doing well on her statin therapy for cholesterol management without side effects.  She reports that her arthritis symptoms have been fairly well controlled on the Tylenol Arthritis that she is currently taking.  She reports that she has been seeing nephrology.  She reports that her heartburn symptoms have been fairly well controlled.  She is due for repeat labs.  She is also due for screening mammogram and bone density, as well as a repeat thyroid ultrasound.  She is otherwise without any other new complaints today in the office.    Hypertension  This is a chronic problem. The current episode started more than 1 year ago. The problem has been resolved since onset. The problem is controlled. Pertinent negatives include no anxiety or sweats. There are no associated agents to hypertension. Risk factors for coronary artery disease include post-menopausal state, diabetes mellitus, dyslipidemia, family history, sedentary lifestyle and stress. Past treatments include angiotensin blockers. Current antihypertension treatment includes angiotensin blockers. The current treatment provides significant improvement. Compliance problems include diet and exercise.  Hypertensive end-organ damage includes kidney disease. Identifiable causes of hypertension include chronic renal disease.   Diabetes  She presents for her follow-up diabetic visit. She has type 2 diabetes mellitus. Her disease course has been stable. There are no hypoglycemic associated symptoms. Pertinent negatives for hypoglycemia include no sweats. There are no hypoglycemic complications. Symptoms are stable. There are no diabetic complications. Risk factors for coronary artery disease  include diabetes mellitus, dyslipidemia, family history, post-menopausal, sedentary lifestyle and stress. Current diabetic treatment includes oral agent (triple therapy) and insulin injections. She is compliant with treatment most of the time. Her weight is stable. She is following a generally healthy diet. She has not had a previous visit with a dietitian. She rarely participates in exercise. An ACE inhibitor/angiotensin II receptor blocker is being taken. She does not see a podiatrist.Eye exam is not current.   Arthritis  Presents for follow-up visit. She complains of pain and joint warmth. The symptoms have been stable. Associated symptoms include pain at night and pain while resting. Pertinent negatives include no dry eyes, dysuria, Raynaud's syndrome or uveitis. Compliance with total regimen is %. Compliance with medications is %.        The following portions of the patient's history were reviewed and updated as appropriate: allergies, current medications, past family history, past medical history, past social history, past surgical history and problem list.    Review of Systems   Constitutional: Negative.    HENT: Negative.    Eyes: Negative.    Respiratory: Negative.    Cardiovascular: Negative.    Gastrointestinal: Negative.    Genitourinary: Negative for dysuria.   Musculoskeletal: Positive for arthritis.   Skin: Negative.    Allergic/Immunologic: Negative.    Neurological: Negative.    Hematological: Negative.    Psychiatric/Behavioral: Negative.        Objective   Physical Exam  Vitals reviewed.   Constitutional:       General: She is not in acute distress.     Appearance: She is well-developed. She is not diaphoretic.   HENT:      Head: Normocephalic.      Right Ear: External ear normal.      Left Ear: External ear normal.      Nose: Nose normal.   Eyes:      Pupils: Pupils are equal, round, and reactive to light.   Neck:      Thyroid: No thyromegaly.      Vascular: No JVD.   Cardiovascular:       Rate and Rhythm: Normal rate and regular rhythm.      Heart sounds: No murmur heard.   No friction rub. No gallop.    Pulmonary:      Effort: Pulmonary effort is normal. No respiratory distress.      Breath sounds: Normal breath sounds. No wheezing or rales.   Musculoskeletal:         General: Normal range of motion.      Cervical back: Normal range of motion and neck supple.   Skin:     General: Skin is warm and dry.      Coloration: Skin is not pale.      Findings: No erythema or rash.   Neurological:      Mental Status: She is alert and oriented to person, place, and time.   Psychiatric:         Behavior: Behavior normal.         Thought Content: Thought content normal.         Judgment: Judgment normal.           Assessment/Plan   Diagnoses and all orders for this visit:    1. Uncontrolled type 2 diabetes mellitus with hyperglycemia (CMS/[facility]) (Primary)  -     Comprehensive Metabolic Panel  -     Hemoglobin A1c    2. Thyroid nodule  -     US Thyroid; Future    3. Encounter for screening mammogram for breast cancer  -     Mammo Screening Bilateral With CAD; Future    4. Screening for osteoporosis  -     DEXA Bone Density Axial; Future    5. Postmenopausal  -     DEXA Bone Density Axial; Future               Patient's Body mass index is 23 kg/m². indicating that she is within normal range (BMI 18.5-24.9). No BMI management plan needed..    Schedule for screening mammogram and bone density.  Schedule for thyroid ultrasound.  Lab following office visit.  Continue current medications.  Follow up in 3 months for routine follow up.  Follow up sooner for problems/concerns.  Patient verbalized understanding and agreement with plan of care.        This document has been electronically signed by CHEYENNE Lerma on June 20, 2021 18:08 CDT

## 2021-06-28 NOTE — ANESTHESIA PREPROCEDURE EVALUATION
Anesthesia Evaluation     Patient summary reviewed and Nursing notes reviewed   no history of anesthetic complications:  NPO Solid Status: > 8 hours  NPO Liquid Status: > 2 hours           Airway   Mallampati: II  TM distance: >3 FB  Neck ROM: full  No difficulty expected  Dental    (+) lower dentures and partials    Pulmonary - negative pulmonary ROS and normal exam   Cardiovascular - normal exam    ECG reviewed    (+) hypertension well controlled less than 2 medications, hyperlipidemia,       Neuro/Psych- negative ROS  (-) seizures, CVA  GI/Hepatic/Renal/Endo    (+)  hiatal hernia, GERD,  diabetes mellitus type 2 well controlled using insulin,     Musculoskeletal     (+) neck pain,   Abdominal  - normal exam   Substance History - negative use     OB/GYN          Other - negative ROS                         Anesthesia Plan    ASA 3     MAC     intravenous induction     Anesthetic plan, all risks, benefits, and alternatives have been provided, discussed and informed consent has been obtained with: patient.    Plan discussed with CRNA.

## 2021-06-28 NOTE — ANESTHESIA POSTPROCEDURE EVALUATION
Patient: Brenda Garzon    Procedure Summary     Date: 06/28/21 Room / Location: [facility] ENDOSCOPY 3 / [facility] ENDOSCOPY    Anesthesia Start: 0938 Anesthesia Stop: 0956    Procedure: ESOPHAGOGASTRODUODENOSCOPY WITH DILATATION (N/A ) Diagnosis:       Peptic stricture of esophagus      Dysphagia, unspecified type      Anemia, blood loss      Gastroesophageal reflux disease with esophagitis without hemorrhage      Pain of upper abdomen      (Peptic stricture of esophagus [K22.2])      (Dysphagia, unspecified type [R13.10])      (Anemia, blood loss [D50.0])      (Gastroesophageal reflux disease with esophagitis without hemorrhage [K21.00])      (Pain of upper abdomen [R10.10])    Surgeons: Christoph Porras MD Provider: Saray Fuller CRNA    Anesthesia Type: MAC ASA Status: 3          Anesthesia Type: MAC    Vitals  No vitals data found for the desired time range.          Post Anesthesia Care and Evaluation    Patient location during evaluation: bedside  Patient participation: complete - patient participated  Level of consciousness: awake  Pain score: 0  Pain management: adequate  Airway patency: patent  Anesthetic complications: No anesthetic complications  PONV Status: none  Cardiovascular status: acceptable  Respiratory status: acceptable  Hydration status: acceptable    Comments: --------------------            06/28/21               0955     --------------------   BP:       92/55     Pulse:        80       Resp:       14         Temp:         97.9       SpO2:       100        --------------------

## 2021-09-07 NOTE — PATIENT INSTRUCTIONS
MyPlate from USDA    MyPlate is an outline of a general healthy diet based on the 2010 Dietary Guidelines for Americans, from the U.[place] Department of Agriculture (USDA). It sets guidelines for how much food you should eat from each food group based on your age, sex, and level of physical activity.  What are tips for following MyPlate?  To follow MyPlate recommendations:  · Eat a wide variety of fruits and vegetables, grains, and protein foods.  · Serve smaller portions and eat less food throughout the day.  · Limit portion sizes to avoid overeating.  · Enjoy your food.  · Get at least 150 minutes of exercise every week. This is about 30 minutes each day, 5 or more days per week.  It can be difficult to have every meal look like MyPlate. Think about MyPlate as eating guidelines for an entire day, rather than each individual meal.  Fruits and vegetables  · Make half of your plate fruits and vegetables.  · Eat many different colors of fruits and vegetables each day.  · For a 2,000 calorie daily food plan, eat:  ? 2½ cups of vegetables every day.  ? 2 cups of fruit every day.  · 1 cup is equal to:  ? 1 cup raw or cooked vegetables.  ? 1 cup raw fruit.  ? 1 medium-sized orange, apple, or banana.  ? 1 cup 100% fruit or vegetable juice.  ? 2 cups raw leafy greens, such as lettuce, spinach, or kale.  ? ½ cup dried fruit.  Grains  · One fourth of your plate should be grains.  · Make at least half of the grains you eat each day whole grains.  · For a 2,000 calorie daily food plan, eat 6 oz of grains every day.  · 1 oz is equal to:  ? 1 slice bread.  ? 1 cup cereal.  ? ½ cup cooked rice, cereal, or pasta.  Protein  · One fourth of your plate should be protein.  · Eat a wide variety of protein foods, including meat, poultry, fish, eggs, beans, nuts, and tofu.  · For a 2,000 calorie daily food plan, eat 5½ oz of protein every day.  · 1 oz is equal to:  ? 1 oz meat, poultry, or fish.  ? ¼ cup cooked beans.  ? 1 egg.  ? ½ oz nuts  or seeds.  ? 1 Tbsp peanut butter.  Dairy  · Drink fat-free or low-fat (1%) milk.  · Eat or drink dairy as a side to meals.  · For a 2,000 calorie daily food plan, eat or drink 3 cups of dairy every day.  · 1 cup is equal to:  ? 1 cup milk, yogurt, cottage cheese, or soy milk (soy beverage).  ? 2 oz processed cheese.  ? 1½ oz natural cheese.  Fats, oils, salt, and sugars  · Only small amounts of oils are recommended.  · Avoid foods that are high in calories and low in nutritional value (empty calories), like foods high in fat or added sugars.  · Choose foods that are low in salt (sodium). Choose foods that have less than 140 milligrams (mg) of sodium per serving.  · Drink water instead of sugary drinks. Drink enough water each day to keep your urine pale yellow.  Where to find support  · Work with your health care provider or a nutrition specialist (dietitian) to develop a customized eating plan that is right for you.  · Download an raphael (mobile application) to help you track your daily food intake.  Where to find more information  · Go to ChooseMyPlate.gov for more information.  Summary  · MyPlate is a general guideline for healthy eating from the USDA. It is based on the 2010 Dietary Guidelines for Americans.  · In general, fruits and vegetables should take up ½ of your plate, grains should take up ¼ of your plate, and protein should take up ¼ of your plate.  This information is not intended to replace advice given to you by your health care provider. Make sure you discuss any questions you have with your health care provider.  Document Revised: 05/21/2020 Document Reviewed: 03/19/2018  Elsevier Patient Education © 2021 [employer]  BMI for Adults  What is BMI?  Body mass index (BMI) is a number that is calculated from a person's weight and height. BMI can help estimate how much of a person's weight is composed of fat. BMI does not measure body fat directly. Rather, it is an alternative to procedures that  "directly measure body fat, which can be difficult and expensive.  BMI can help identify people who may be at higher risk for certain medical problems.  What are BMI measurements used for?  BMI is used as a screening tool to identify possible weight problems. It helps determine whether a person is obese, overweight, a healthy weight, or underweight.  BMI is useful for:  · Identifying a weight problem that may be related to a medical condition or may increase the risk for medical problems.  · Promoting changes, such as changes in diet and exercise, to help reach a healthy weight. BMI screening can be repeated to see if these changes are working.  How is BMI calculated?  BMI involves measuring your weight in relation to your height. Both height and weight are measured, and the BMI is calculated from those numbers. This can be done either in English ([place] or metric measurements. Note that charts and online BMI calculators are available to help you find your BMI quickly and easily without having to do these calculations yourself.  To calculate your BMI in English ([place] measurements:    1. Measure your weight in pounds (lb).  2. Multiply the number of pounds by 703.  ? For example, for a person who weighs 180 lb, multiply that number by 703, which equals 126,540.  3. Measure your height in inches. Then multiply that number by itself to get a measurement called \"inches squared.\"  ? For example, for a person who is 70 inches tall, the \"inches squared\" measurement is 70 inches x 70 inches, which equals 4,900 inches squared.  4. Divide the total from step 2 (number of lb x 703) by the total from step 3 (inches squared): 126,540 ÷ 4,900 = 25.8. This is your BMI.  To calculate your BMI in metric measurements:  1. Measure your weight in kilograms (kg).  2. Measure your height in meters (m). Then multiply that number by itself to get a measurement called \"meters squared.\"  ? For example, for a person who is 1.75 m tall, the " "\"meters squared\" measurement is 1.75 m x 1.75 m, which is equal to 3.1 meters squared.  3. Divide the number of kilograms (your weight) by the meters squared number. In this example: 70 ÷ 3.1 = 22.6. This is your BMI.  What do the results mean?  BMI charts are used to identify whether you are underweight, normal weight, overweight, or obese. The following guidelines will be used:  · Underweight: BMI less than 18.5.  · Normal weight: BMI between 18.5 and 24.9.  · Overweight: BMI between 25 and 29.9.  · Obese: BMI of 30 or above.  Keep these notes in mind:  · Weight includes both fat and muscle, so someone with a muscular build, such as an athlete, may have a BMI that is higher than 24.9. In cases like these, BMI is not an accurate measure of body fat.  · To determine if excess body fat is the cause of a BMI of 25 or higher, further assessments may need to be done by a health care provider.  · BMI is usually interpreted in the same way for men and women.  Where to find more information  For more information about BMI, including tools to quickly calculate your BMI, go to these websites:  · Centers for Disease Control and Prevention: www.cdc.gov  · American Heart Association: www.heart.org  · National Heart, Lung, and Blood [place]: www.nhlbi.nih.gov  Summary  · Body mass index (BMI) is a number that is calculated from a person's weight and height.  · BMI may help estimate how much of a person's weight is composed of fat. BMI can help identify those who may be at higher risk for certain medical problems.  · BMI can be measured using English measurements or metric measurements.  · BMI charts are used to identify whether you are underweight, normal weight, overweight, or obese.  This information is not intended to replace advice given to you by your health care provider. Make sure you discuss any questions you have with your health care provider.  Document Revised: 09/09/2020 Document Reviewed: 07/17/2020  Andrea " Patient Education © 2021 [employer]

## 2021-09-07 NOTE — PROGRESS NOTES
Chief Complaint   Patient presents with   • Difficulty Swallowing       ENDO PROCEDURE ORDERED:    Subjective    Brenda Garzon is a 80 y.o. female. she is here today for follow-up.    History of Present Illness    Patient seen on a recheck of her iron deficiency anemia, GERD, dysphagia. Last seen 07/23/2021, was started on Pepcid. She states it is helping when she needs to take it. She does take Carafate as needed. She denied significant abdominal pain. She had some increased regurgitation last night but denied dysphagia. Bowels are moving without blood or mucus. Weight is up 3 pounds since last visit. She had a previous colonoscopy 03/16/2021. EGD with dilatation to 51-French on 05/10/2021. Dilation to 54-French on 06/28/2021.     Patient did not get her laboratories prior to this visit despite being called. After the patient left her labs were drawn today. Her hemoglobin and hematocrit dropped to 10.1 and 31.1, 11. Her iron increased from 33 to 48 with 9% saturation. Ferritin increased from 17 to 20.3.     A/P: Patient with chronic GERD, peptic stricture. Dysphagia appears to be doing well currently. I encouraged her to take the Pepcid more regularly, she may take the Carafate as needed. She will be contacted and encouraged to take iron supplementation. Will need to consider further evaluation if her anemia persists but since her iron is increasing will continue on iron supplementation and plan follow-up in 6-8 weeks with repeat laboratories prior.       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 Internal & external hemorrhoids found.   • COLONOSCOPY N/A 3/16/2021    Procedure: COLONOSCOPY;  Surgeon: Christoph Porras MD;  Location: [facility] ENDOSCOPY;  Service: Gastroenterology;  Laterality: N/A;   • ENDOSCOPY N/A 3/16/2021    Procedure: ESOPHAGOGASTRODUODENOSCOPY WITH DILATATION;  Surgeon: Christoph Porras MD;  Location: [facility] ENDOSCOPY;  Service: Gastroenterology;  Laterality: N/A;   • ENDOSCOPY N/A 5/10/2021    Procedure: ESOPHAGOGASTRODUODENOSCOPY WITH DILATATION--schedule in one month;  Surgeon: Christoph Porras MD;  Location: [facility] ENDOSCOPY;  Service: Gastroenterology;  Laterality: N/A;   • ENDOSCOPY N/A 6/28/2021    Procedure: ESOPHAGOGASTRODUODENOSCOPY WITH DILATATION;  Surgeon: Christoph Porras MD;  Location: [facility] ENDOSCOPY;  Service: Gastroenterology;  Laterality: N/A;   • LEG SURGERY Right    • SUBTOTAL HYSTERECTOMY     • UPPER GASTROINTESTINAL ENDOSCOPY  11/08/2013    Esophageal stricture present. Dilatation performed. Hiatus hernia in esophaugs. Gastritis in stomach. Biopsy taken. Normal duodenum. Biopsy taken.   • UPPER GASTROINTESTINAL ENDOSCOPY  05/10/2021     Family History   Problem Relation Age of Onset   • Hypertension Mother    • Stroke Mother    • Arthritis Mother    • Osteoporosis Mother    • Heart attack Father    • Heart disease Father    • Diabetes Sister    • Arthritis Sister    • Hypertension Sister    • Hyperlipidemia Sister    • Osteoporosis Sister    • Diabetes Brother    • Heart disease Brother    • Throat cancer Daughter    • Diabetes Daughter    • Migraines Daughter      OB History    No obstetric history  on file.       Allergies   Allergen Reactions   • Codeine Other (See Comments)     Makes patient very sleepy      Social History     Socioeconomic History   • Marital status:      Spouse name: Not on file   • Number of children: Not on file   • Years of education: Not on file   • Highest education level: Not on file   Tobacco Use   • Smoking status: Never Smoker   • Smokeless tobacco: Never Used   Vaping Use   • Vaping Use: Never used   Substance and Sexual Activity   • Alcohol use: No   • Drug use: No   • Sexual activity: Defer     Current Medications:  Prior to Admission medications    Medication Sig Start Date End Date Taking? Authorizing Provider   Accu-Chek FastClix Lancets misc 1 each 4 (Four) Times a Day With Meals & at Bedtime. 5/12/21  Yes Jodie Aguilera APRN   acetaminophen (Tylenol 8 Hour Arthritis Pain) 650 MG 8 hr tablet Take 2 tablets by mouth Every 8 (Eight) Hours As Needed for Mild Pain . 5/12/21  Yes Jodie Aguilera APRN   aspirin 81 MG tablet Take 81 mg by mouth daily.   Yes Provider, MD Manuel   atorvastatin (LIPITOR) 20 MG tablet Take 1 tablet by mouth Every Night. 5/12/21  Yes Jodie Aguilera APRN   Dulaglutide 1.5 MG/0.5ML solution pen-injector Inject 1.5 mg under the skin into the appropriate area as directed 1 (One) Time Per Week. 5/12/21  Yes Jodie Aguilera APRN   famotidine (Pepcid) 40 MG tablet Take 1 tablet by mouth Daily. 7/23/21  Yes Carmine Liriano PA-C   Glucose 15 GM/32ML gel Take 32 mL by mouth 1 (One) Time As Needed (hypoglycemia). 5/12/21  Yes Jodie Aguilera APRN   glucose blood (Accu-Chek Ava Plus) test strip 1 each by Other route See Admin Instructions. Use as instructed 5/12/21  Yes Jodie Aguilera APRN   Insulin Glargine, 1 Unit Dial, (TOUJEO) 300 UNIT/ML solution pen-injector injection Inject 15 Units under the skin into the appropriate area as directed every night at bedtime. 5/12/21  Yes Jodie Aguilera APRN   Insulin Pen Needle (Pen Needles) 32G X 4 MM misc 1 each  "Daily. 5/12/21  Yes Jodie Aguilera APRN   losartan (COZAAR) 50 MG tablet Take 1 tablet by mouth 2 (Two) Times a Day. 5/12/21  Yes Jodie Aguilera APRN   meclizine (ANTIVERT) 25 MG tablet Take 0.5-1 tablets by mouth 3 (Three) Times a Day As Needed for dizziness. 2/15/17  Yes Jodie Aguilera APRN   metFORMIN ER (Glucophage XR) 500 MG 24 hr tablet 1 with bkfast and 1 w supper 5/12/21  Yes Jodie Aguilera APRN   sucralfate (Carafate) 1 g tablet Take 1 tablet by mouth 4 (Four) Times a Day Before Meals & at Bedtime As Needed (acid reflux). 5/12/21  Yes Jodie Aguilera APRN   tiZANidine (Zanaflex) 4 MG tablet Take 0.5-1 tablets by mouth At Night As Needed for Muscle Spasms. 5/12/21  Yes Jodie Aguilera APRN   verapamil SR (CALAN-SR) 120 MG CR tablet Take 1 tablet by mouth Every Night. 5/12/21  Yes Jodie Aguilera APRN   vitamin D (ERGOCALCIFEROL) 1.25 MG (93681 UT) capsule capsule Take 1 capsule by mouth Every 7 (Seven) Days. 11/3/20  Yes Jodie Aguilera APRN     Review of Systems  Review of Systems       Objective    /60   Pulse 72   Ht 149.9 cm (59\")   Wt 49 kg (108 lb)   LMP  (LMP Unknown)   BMI 21.81 kg/m²   Physical Exam  Vitals and nursing note reviewed.   Constitutional:       General: She is not in acute distress.     Appearance: She is well-developed.   HENT:      Head: Normocephalic and atraumatic.   Eyes:      Pupils: Pupils are equal, round, and reactive to light.   Cardiovascular:      Rate and Rhythm: Normal rate and regular rhythm.      Heart sounds: Normal heart sounds.   Pulmonary:      Effort: Pulmonary effort is normal.      Breath sounds: Normal breath sounds.   Abdominal:      General: Bowel sounds are normal. There is no distension or abdominal bruit.      Palpations: Abdomen is soft. Abdomen is not rigid. There is no shifting dullness or mass.      Tenderness: There is abdominal tenderness. There is no guarding or rebound.      Hernia: No hernia is present. There is no hernia in the ventral area. "   Musculoskeletal:         General: Normal range of motion.      Cervical back: Normal range of motion.   Skin:     General: Skin is warm and dry.   Neurological:      Mental Status: She is alert and oriented to person, place, and time.   Psychiatric:         Behavior: Behavior normal.         Thought Content: Thought content normal.         Judgment: Judgment normal.       Assessment/Plan      1. Gastroesophageal reflux disease with esophagitis without hemorrhage    2. Iron deficiency anemia due to chronic blood loss    3. Dysphagia, unspecified type    4. Pain of upper abdomen    .   Diagnoses and all orders for this visit:    1. Gastroesophageal reflux disease with esophagitis without hemorrhage (Primary)    2. Iron deficiency anemia due to chronic blood loss  -     Iron; Future  -     Vitamin B12 and Folate; Future  -     Ferritin; Future  -     CBC and Differential; Future  -     Reticulocytes; Future    3. Dysphagia, unspecified type    4. Pain of upper abdomen  -     Iron; Future  -     Vitamin B12 and Folate; Future  -     Ferritin; Future  -     CBC and Differential; Future  -     Reticulocytes; Future    Other orders  -     ferrous sulfate 325 (65 FE) MG EC tablet; Take 1 tablet by mouth 3 (Three) Times a Day With Meals.  Dispense: 90 tablet; Refill: 3        Orders placed during this encounter include:  Orders Placed This Encounter   Procedures   • Iron     Due prior to follow up.     Standing Status:   Future     Standing Expiration Date:   11/30/2021   • Vitamin B12 and Folate     Due prior to follow up.     Standing Status:   Future     Standing Expiration Date:   11/30/2021     Order Specific Question:   Release to patient     Answer:   Immediate   • Ferritin     Due prior to follow up.     Standing Status:   Future     Standing Expiration Date:   11/30/2021   • Reticulocytes     Due prior to follow up.     Standing Status:   Future     Standing Expiration Date:   11/30/2021     Order Specific  Question:   Release to patient     Answer:   Immediate   • CBC and Differential     Due prior to follow up.     Standing Status:   Future     Standing Expiration Date:   11/30/2021     Order Specific Question:   Manual Differential     Answer:   No       Medications prescribed:  New Medications Ordered This Visit   Medications   • ferrous sulfate 325 (65 FE) MG EC tablet     Sig: Take 1 tablet by mouth 3 (Three) Times a Day With Meals.     Dispense:  90 tablet     Refill:  3       Requested Prescriptions     Signed Prescriptions Disp Refills   • ferrous sulfate 325 (65 FE) MG EC tablet 90 tablet 3     Sig: Take 1 tablet by mouth 3 (Three) Times a Day With Meals.       Review and/or summary of lab tests, radiology, procedures, medications. Review and summary of old records and obtaining of history. The risks and benefits of my recommendations, as well as other treatment options were discussed with the patient today. Questions were answered.    Follow-up: Return in about 2 months (around 11/7/2021), or if symptoms worsen or fail to improve.     * Surgery not found *      This document has been electronically signed by Carmine Liriano PA-C on September 21, 2021 19:08 CDT      Results for orders placed or performed in visit on 09/07/21   Iron Profile    Specimen: Blood   Result Value Ref Range    Iron 48 37 - 145 mcg/dL    Iron Saturation 9 (L) 20 - 50 %    Transferrin 360 200 - 360 mg/dL    TIBC 536 298 - 536 mcg/dL   Hemoglobin & Hematocrit, Blood    Specimen: Blood   Result Value Ref Range    Hemoglobin 10.1 (L) 12.0 - 15.9 g/dL    Hematocrit 31.1 (L) 34.0 - 46.6 %   Ferritin    Specimen: Blood   Result Value Ref Range    Ferritin 20.30 13.00 - 150.00 ng/mL   Results for orders placed or performed in visit on 06/25/21   COVID-19,  [facility]-HOUSE, NP SWAB IN TRANSPORT MEDIA 8-10 HR TAT - Swab, Nasopharynx    Specimen: Nasopharynx; Swab   Result Value Ref Range    COVID19 Not Detected Not Detected - Ref. Range    Results for orders placed or performed in visit on 06/08/21   CBC Auto Differential    Specimen: Blood   Result Value Ref Range    WBC 6.49 3.40 - 10.80 10*3/mm3    RBC 4.31 3.77 - 5.28 10*6/mm3    Hemoglobin 11.0 (L) 12.0 - 15.9 g/dL    Hematocrit 34.3 34.0 - 46.6 %    MCV 79.6 79.0 - 97.0 fL    MCH 25.5 (L) 26.6 - 33.0 pg    MCHC 32.1 31.5 - 35.7 g/dL    RDW 15.2 12.3 - 15.4 %    RDW-SD 43.8 37.0 - 54.0 fl    MPV 12.7 (H) 6.0 - 12.0 fL    Platelets 261 140 - 450 10*3/mm3    Neutrophil % 65.1 42.7 - 76.0 %    Lymphocyte % 24.2 19.6 - 45.3 %    Monocyte % 7.2 5.0 - 12.0 %    Eosinophil % 2.3 0.3 - 6.2 %    Basophil % 0.9 0.0 - 1.5 %    Immature Grans % 0.3 0.0 - 0.5 %    Neutrophils, Absolute 4.22 1.70 - 7.00 10*3/mm3    Lymphocytes, Absolute 1.57 0.70 - 3.10 10*3/mm3    Monocytes, Absolute 0.47 0.10 - 0.90 10*3/mm3    Eosinophils, Absolute 0.15 0.00 - 0.40 10*3/mm3    Basophils, Absolute 0.06 0.00 - 0.20 10*3/mm3    Immature Grans, Absolute 0.02 0.00 - 0.05 10*3/mm3    nRBC 0.0 0.0 - 0.2 /100 WBC   Iron Profile    Specimen: Blood   Result Value Ref Range    Iron 33 (L) 37 - 145 mcg/dL    Iron Saturation 6 (L) 20 - 50 %    Transferrin 357 200 - 360 mg/dL    TIBC 532 298 - 536 mcg/dL   Ferritin    Specimen: Blood   Result Value Ref Range    Ferritin 17.00 13.00 - 150.00 ng/mL   Results for orders placed or performed in visit on 05/28/21   Hemoglobin A1c    Specimen: Blood   Result Value Ref Range    Hemoglobin A1C 6.90 (H) 4.80 - 5.60 %   Comprehensive Metabolic Panel    Specimen: Blood   Result Value Ref Range    Glucose 121 (H) 65 - 99 mg/dL    BUN 22 8 - 23 mg/dL    Creatinine 0.97 0.57 - 1.00 mg/dL    Sodium 139 136 - 145 mmol/L    Potassium 4.6 3.5 - 5.2 mmol/L    Chloride 103 98 - 107 mmol/L    CO2 26.3 22.0 - 29.0 mmol/L    Calcium 9.3 8.6 - 10.5 mg/dL    Total Protein 6.8 6.0 - 8.5 g/dL    Albumin 4.00 3.50 - 5.20 g/dL    ALT (SGPT) 14 1 - 33 U/L    AST (SGOT) 21 1 - 32 U/L    Alkaline Phosphatase 91 39 -  117 U/L    Total Bilirubin 0.2 0.0 - 1.2 mg/dL    eGFR Non African Amer 55 (L) >60 mL/min/1.73    Globulin 2.8 gm/dL    A/G Ratio 1.4 g/dL    BUN/Creatinine Ratio 22.7 7.0 - 25.0    Anion Gap 9.7 5.0 - 15.0 mmol/L   Results for orders placed or performed in visit on 05/07/21   COVID-19, [facility] IN-HOUSE, NP SWAB IN TRANSPORT MEDIA 8-10 HR TAT - Swab, Nasopharynx    Specimen: Nasopharynx; Swab   Result Value Ref Range    COVID19 Not Detected Not Detected - Ref. Range   Results for orders placed or performed in visit on 04/11/21   COVID-19, [facility] IN-HOUSE, NP SWAB IN TRANSPORT MEDIA 8-10 HR TAT - Swab, Nasopharynx    Specimen: Nasopharynx; Swab   Result Value Ref Range    COVID19 Detected (C) Not Detected - Ref. Range   Results for orders placed or performed in visit on 03/23/21   CBC Auto Differential    Specimen: Blood   Result Value Ref Range    WBC 8.20 3.40 - 10.80 10*3/mm3    RBC 3.99 3.77 - 5.28 10*6/mm3    Hemoglobin 10.3 (L) 12.0 - 15.9 g/dL    Hematocrit 31.7 (L) 34.0 - 46.6 %    MCV 79.4 79.0 - 97.0 fL    MCH 25.8 (L) 26.6 - 33.0 pg    MCHC 32.5 31.5 - 35.7 g/dL    RDW 14.3 12.3 - 15.4 %    RDW-SD 40.5 37.0 - 54.0 fl    MPV 13.8 (H) 6.0 - 12.0 fL    Platelets 226 140 - 450 10*3/mm3    Neutrophil % 62.8 42.7 - 76.0 %    Lymphocyte % 24.1 19.6 - 45.3 %    Monocyte % 10.0 5.0 - 12.0 %    Eosinophil % 2.3 0.3 - 6.2 %    Basophil % 0.6 0.0 - 1.5 %    Neutrophils, Absolute 5.14 1.70 - 7.00 10*3/mm3    Lymphocytes, Absolute 1.98 0.70 - 3.10 10*3/mm3    Monocytes, Absolute 0.82 0.10 - 0.90 10*3/mm3    Eosinophils, Absolute 0.19 0.00 - 0.40 10*3/mm3    Basophils, Absolute 0.05 0.00 - 0.20 10*3/mm3   Comprehensive Metabolic Panel    Specimen: Blood   Result Value Ref Range    Glucose 117 (H) 65 - 99 mg/dL    BUN 19 8 - 23 mg/dL    Creatinine 1.02 (H) 0.57 - 1.00 mg/dL    Sodium 136 136 - 145 mmol/L    Potassium 4.7 3.5 - 5.2 mmol/L    Chloride 102 98 - 107 mmol/L    CO2 22.7 22.0 - 29.0 mmol/L    Calcium 9.4 8.6 -  10.5 mg/dL    Total Protein 6.7 6.0 - 8.5 g/dL    Albumin 3.90 3.50 - 5.20 g/dL    ALT (SGPT) 13 1 - 33 U/L    AST (SGOT) 28 1 - 32 U/L    Alkaline Phosphatase 83 39 - 117 U/L    Total Bilirubin 0.4 0.0 - 1.2 mg/dL    eGFR Non African Amer 52 (L) >60 mL/min/1.73    Globulin 2.8 gm/dL    A/G Ratio 1.4 g/dL    BUN/Creatinine Ratio 18.6 7.0 - 25.0    Anion Gap 11.3 5.0 - 15.0 mmol/L   Results for orders placed or performed during the hospital encounter of 03/16/21   Tissue Pathology Exam    Specimen: A: Gastric, Antrum; Tissue    B: Esophagus, Distal; Tissue   Result Value Ref Range    Case Report       Surgical Pathology Report                         Case: [id number]                                  Authorizing Provider:  Christoph Porras MD        Collected:           03/16/2021 10:21 AM          Ordering Location:     [facility]             Received:            03/16/2021 10:54 AM                                 [place] [facility]                                                     Pathologist:           Gustavo Lynn MD                                                           Specimens:   1) - Gastric, Antrum, antrum bx                                                                     2) - Esophagus, Distal, distal esophagus bx                                                Final Diagnosis       SEE SCANNED REPORT         *Note: Due to a large number of results and/or encounters for the requested time period, some results have not been displayed. A complete set of results can be found in Results Review.

## 2021-09-23 NOTE — TELEPHONE ENCOUNTER
AN ATTEMPT TO CONTACT THE PATIENT WAS UNSUCCESSFUL THERE WAS NO ANSWER AND NO VOICEMAIL OPTION. PER LULU HAIDER MS, PA-C-Her labs are somewhat better, but she still needs iron supplementation. I sent new Rx. Repeat lab before follow up.

## 2021-09-23 NOTE — TELEPHONE ENCOUNTER
----- Message from Carmine Liriano PA-C sent at 9/21/2021  7:07 PM CDT -----  Please call the patient regarding her abnormal result. Her labs are somewhat better, but she still needs iron supplementation. I sent new Rx. Repeat lab before follow up.

## 2021-10-06 NOTE — PATIENT INSTRUCTIONS
Type 2 Diabetes Mellitus, Self-Care, Adult  Caring for yourself after you have been diagnosed with type 2 diabetes (type 2 diabetes mellitus) means keeping your blood sugar (glucose) under control with a balance of:  · Nutrition.  · Exercise.  · Lifestyle changes.  · Medicines or insulin, if needed.  · Support from your team of health care providers and others.  The following information explains what you need to know to manage your diabetes at home.  What are the risks?  Having diabetes can put you at risk for other long-term (chronic) conditions, such as heart disease and kidney disease. Your health care provider may prescribe medicines to help prevent complications from diabetes.  How to monitor blood glucose    · Check your blood glucose every day, as often as told by your health care provider.  · Have your A1C (hemoglobin A1C) level checked two or more times a year, or as often as told by your health care provider.  · Your health care provider will set personalized treatment goals for you. Generally, the goal of treatment is to maintain the following blood glucose levels:  ? Before meals:  mg/dL (4.4-7.2 mmol/L).  ? After meals: below 180 mg/dL (10 mmol/L).  ? A1C level: less than 7%.  How to manage hyperglycemia and hypoglycemia  Hyperglycemia symptoms  Hyperglycemia, also called high blood glucose, occurs when blood glucose is too high. Make sure you know the early signs of hyperglycemia, such as:  · Increased thirst.  · Hunger.  · Feeling very tired.  · Needing to urinate more often than usual.  · Blurry vision.  Hypoglycemia symptoms  Hypoglycemia, also called low blood glucose, occurs with a blood glucose level at or below 70 mg/dL (3.9 mmol/L). Diabetes medicines lower your blood glucose and can cause hypoglycemia. The risk for hypoglycemia increases during or after exercise, during sleep, during illness, and when skipping meals or not eating for a long time (fasting).  It is important to know the  symptoms of hypoglycemia and treat it right away. Always have a 15-gram rapid-acting carbohydrate snack with you to treat low blood glucose. Family members and close friends should also know the symptoms and understand how to treat hypoglycemia, in case you are not able to treat yourself. Symptoms may include:  · Hunger.  · Anxiety.  · Sweating and feeling clammy.  · Dizziness or feeling light-headed.  · Sleepiness.  · Increased heart rate.  · Irritability.  · Tingling or numbness around the mouth, lips, or tongue.  · Restless sleep.  Severe hypoglycemia is when your blood glucose level is at or below 54 mg/dL (3 mmol/L). Severe hypoglycemia is an emergency. Do not wait to see if the symptoms will go away. Get medical help right away. Call your local emergency services (911 in the [place] Do not drive yourself to the hospital.  If you have severe hypoglycemia and you cannot eat or drink, you may need glucagon. A family member or close friend should learn how to check your blood glucose and how to give you glucagon. Ask your health care provider if you need to have an emergency glucagon kit available.  Follow these instructions at home:  Medicines  · Take diabetes medicines as told by your health care provider. If your health care provider prescribed insulin or diabetes medicines, take them every day.  · Do not run out of insulin or other diabetes medicines. Plan ahead so you always have these available.  · If you use insulin, adjust your dosage based on your physical activity and what foods you eat. Your health care provider will tell you how to adjust your dosage.  · Take over-the-counter and prescription medicines only as told by your health care provider.  Eating and drinking    What you eat and drink affects your blood glucose and your insulin dosage. Making good choices helps to control your diabetes and prevent other health problems. A healthy meal plan includes eating lean proteins, complex carbohydrates,  fresh fruits and vegetables, low-fat dairy products, and healthy fats.  Make an appointment to see a registered dietitian to help you create an eating plan that is right for you. Make sure that you:  · Follow instructions from your health care provider about eating or drinking restrictions.  · Drink enough fluid to keep your urine pale yellow.  · Keep a record of the carbohydrates that you eat. Do this by reading food labels and learning the standard serving sizes of foods.  · Follow your sick-day plan whenever you cannot eat or drink as usual. Make this plan in advance with your health care provider.    Activity  · Stay active. Exercise regularly, as told by your health care provider. This may include:  ? Stretching and doing strength exercises, such as yoga or weight lifting, 2 or more times a week.  ? Doing 150 minutes or more of moderate-intensity or vigorous-intensity exercise each week. This could be brisk walking, biking, or water aerobics.  § Spread out your activity over 3 or more days of the week.  § Do not go more than 2 days in a row without doing some kind of physical activity.  · When you start a new exercise or activity, work with your health care provider to adjust your insulin, medicines, or food intake as needed.  Lifestyle  · Do not use any products that contain nicotine or tobacco, such as cigarettes, e-cigarettes, and chewing tobacco. If you need help quitting, ask your health care provider.  · If your health care provider says that alcohol is safe for you, limit how much you use to no more than 1 drink a day for women who are not pregnant and 2 drinks a day for men. In the [place] one drink equals one 12 oz bottle of beer (355 mL), one 5 oz glass of wine (148 mL), or one 1½ oz glass of hard liquor (44 mL).  · Learn to manage stress. If you need help with this, ask your health care provider.  Take care of your body    · Keep your immunizations up to date. In addition to getting vaccinations as  told by your health care provider, it is recommended that you get vaccinated against the following illnesses:  ? The flu (influenza). Get a flu shot every year.  ? Pneumonia.  ? Hepatitis B.  · Schedule an eye exam soon after your diagnosis, and then one time every year after that.  · Check your skin and feet every day for cuts, bruises, redness, blisters, or sores. Schedule a foot exam with your health care provider once every year.  · Brush your teeth and gums two times a day, and floss one or more times a day. Visit your dentist one or more times every 6 months.  · Maintain a healthy weight.    General instructions  · Share your diabetes management plan with people in your workplace, school, and household.  · Carry a medical alert card or wear medical alert jewelry.  · Keep all follow-up visits as told by your health care provider. This is important.  Questions to ask your health care provider  · Should I meet with a certified diabetes care and ?  · Where can I find a support group for people with diabetes?  Where to find more information  · American Diabetes Association (ADA): www.diabetes.org  · American Association of Diabetes Care and Education Specialists (ADCES): www.diabeteseducator.org  · International Diabetes Federation (IDF): www.idf.org  Summary  · Caring for yourself after you have been diagnosed with type 2 diabetes (type 2 diabetes mellitus) means keeping your blood sugar (glucose) under control with a balance of nutrition, exercise, lifestyle changes, and medicine.  · Check your blood glucose every day, as often as told by your health care provider.  · Having diabetes can put you at risk for other long-term (chronic) conditions, such as heart disease and kidney disease. Your health care provider may prescribe medicines to help prevent complications from diabetes.  · Share your diabetes management plan with people in your workplace, school, and household.  · Keep all follow-up  "visits as told by your health care provider. This is important.  This information is not intended to replace advice given to you by your health care provider. Make sure you discuss any questions you have with your health care provider.  Document Revised: 01/25/2021 Document Reviewed: 01/26/2021  [email or web address] Patient Education © 2021 [email or web address]      Diabetes Mellitus and Exercise  Exercising regularly is important for overall health, especially for people who have diabetes mellitus. Exercising is not only about losing weight. It has many other health benefits, such as increasing muscle strength and bone density and reducing body fat and stress. This leads to improved fitness, flexibility, and endurance, all of which result in better overall health.  What are the benefits of exercise if I have diabetes?  Exercise has many benefits for people with diabetes. They include:  · Helping to lower and control blood sugar (glucose).  · Helping the body to respond better to the hormone insulin by improving insulin sensitivity.  · Reducing how much insulin the body needs.  · Lowering the risk for heart disease by:  ? Lowering \"bad\" cholesterol and triglyceride levels.  ? Increasing \"good\" cholesterol levels.  ? Lowering blood pressure.  ? Lowering blood glucose levels.  What is my activity plan?  Your health care provider or certified diabetes educator can help you make a plan for the type and frequency of exercise that works for you. This is called your activity plan. Be sure to:  · Get at least 150 minutes of medium-intensity or high-intensity exercise each week. Exercises may include brisk walking, biking, or water aerobics.  · Do stretching and strengthening exercises, such as yoga or weight lifting, at least 2 times a week.  · Spread out your activity over at least 3 days of the week.  · Get some form of physical activity each day.  ? Do not go more than 2 days in a row without some kind of physical activity.  ? Avoid being " inactive for more than 90 minutes at a time. Take frequent breaks to walk or stretch.  · Choose exercises or activities that you enjoy. Set realistic goals.  · Start slowly and gradually increase your exercise intensity over time.  How do I manage my diabetes during exercise?    Monitor your blood glucose  · Check your blood glucose before and after exercising. If your blood glucose is:  ? 240 mg/dL (13.3 mmol/L) or higher before you exercise, check your urine for ketones. These are chemicals created by the liver. If you have ketones in your urine, do not exercise until your blood glucose returns to normal.  ? 100 mg/dL (5.6 mmol/L) or lower, eat a snack containing 15-20 grams of carbohydrate. Check your blood glucose 15 minutes after the snack to make sure that your glucose level is above 100 mg/dL (5.6 mmol/L) before you start your exercise.  · Know the symptoms of low blood glucose (hypoglycemia) and how to treat it. Your risk for hypoglycemia increases during and after exercise.  Follow these tips and your health care provider's instructions  · Keep a carbohydrate snack that is fast-acting for use before, during, and after exercise to help prevent or treat hypoglycemia.  · Avoid injecting insulin into areas of the body that are going to be exercised. For example, avoid injecting insulin into:  ? Your arms, when you are about to play tennis.  ? Your legs, when you are about to go jogging.  · Keep records of your exercise habits. Doing this can help you and your health care provider adjust your diabetes management plan as needed. Write down:  ? Food that you eat before and after you exercise.  ? Blood glucose levels before and after you exercise.  ? The type and amount of exercise you have done.  · Work with your health care provider when you start a new exercise or activity. He or she may need to:  ? Make sure that the activity is safe for you.  ? Adjust your insulin, other medicines, and food that you  eat.  · Drink plenty of water while you exercise. This prevents loss of water (dehydration) and problems caused by a lot of heat in the body (heat stroke).  Where to find more information  · American Diabetes Association: www.diabetes.org  Summary  · Exercising regularly is important for overall health, especially for people who have diabetes mellitus.  · Exercising has many health benefits. It increases muscle strength and bone density and reduces body fat and stress. It also lowers and controls blood glucose.  · Your health care provider or certified diabetes educator can help you make an activity plan for the type and frequency of exercise that works for you.  · Work with your health care provider to make sure any new activity is safe for you. Also work with your health care provider to adjust your insulin, other medicines, and the food you eat.  This information is not intended to replace advice given to you by your health care provider. Make sure you discuss any questions you have with your health care provider.  Document Revised: 09/14/2020 Document Reviewed: 09/14/2020  [employer] Patient Education © 2021 [employer]      Diabetes Mellitus and Nutrition, Adult  When you have diabetes, or diabetes mellitus, it is very important to have healthy eating habits because your blood sugar (glucose) levels are greatly affected by what you eat and drink. Eating healthy foods in the right amounts, at about the same times every day, can help you:  · Control your blood glucose.  · Lower your risk of heart disease.  · Improve your blood pressure.  · Reach or maintain a healthy weight.  What can affect my meal plan?  Every person with diabetes is different, and each person has different needs for a meal plan. Your health care provider may recommend that you work with a dietitian to make a meal plan that is best for you. Your meal plan may vary depending on factors such as:  · The calories you need.  · The medicines you  take.  · Your weight.  · Your blood glucose, blood pressure, and cholesterol levels.  · Your activity level.  · Other health conditions you have, such as heart or kidney disease.  How do carbohydrates affect me?  Carbohydrates, also called carbs, affect your blood glucose level more than any other type of food. Eating carbs naturally raises the amount of glucose in your blood. Carb counting is a method for keeping track of how many carbs you eat. Counting carbs is important to keep your blood glucose at a healthy level, especially if you use insulin or take certain oral diabetes medicines.  It is important to know how many carbs you can safely have in each meal. This is different for every person. Your dietitian can help you calculate how many carbs you should have at each meal and for each snack.  How does alcohol affect me?  Alcohol can cause a sudden decrease in blood glucose (hypoglycemia), especially if you use insulin or take certain oral diabetes medicines. Hypoglycemia can be a life-threatening condition. Symptoms of hypoglycemia, such as sleepiness, dizziness, and confusion, are similar to symptoms of having too much alcohol.  · Do not drink alcohol if:  ? Your health care provider tells you not to drink.  ? You are pregnant, may be pregnant, or are planning to become pregnant.  · If you drink alcohol:  ? Do not drink on an empty stomach.  ? Limit how much you use to:  § 0-1 drink a day for women.  § 0-2 drinks a day for men.  ? Be aware of how much alcohol is in your drink. In the [place] one drink equals one 12 oz bottle of beer (355 mL), one 5 oz glass of wine (148 mL), or one 1½ oz glass of hard liquor (44 mL).  ? Keep yourself hydrated with water, diet soda, or unsweetened iced tea.  § Keep in mind that regular soda, juice, and other mixers may contain a lot of sugar and must be counted as carbs.  What are tips for following this plan?    Reading food labels  · Start by checking the serving size on the  "\"Nutrition Facts\" label of packaged foods and drinks. The amount of calories, carbs, fats, and other nutrients listed on the label is based on one serving of the item. Many items contain more than one serving per package.  · Check the total grams (g) of carbs in one serving. You can calculate the number of servings of carbs in one serving by dividing the total carbs by 15. For example, if a food has 30 g of total carbs per serving, it would be equal to 2 servings of carbs.  · Check the number of grams (g) of saturated fats and trans fats in one serving. Choose foods that have a low amount or none of these fats.  · Check the number of milligrams (mg) of salt (sodium) in one serving. Most people should limit total sodium intake to less than 2,300 mg per day.  · Always check the nutrition information of foods labeled as \"low-fat\" or \"nonfat.\" These foods may be higher in added sugar or refined carbs and should be avoided.  · Talk to your dietitian to identify your daily goals for nutrients listed on the label.  Shopping  · Avoid buying canned, pre-made, or processed foods. These foods tend to be high in fat, sodium, and added sugar.  · Shop around the outside edge of the grocery store. This is where you will most often find fresh fruits and vegetables, bulk grains, fresh meats, and fresh dairy.  Cooking  · Use low-heat cooking methods, such as baking, instead of high-heat cooking methods like deep frying.  · Cook using healthy oils, such as olive, canola, or sunflower oil.  · Avoid cooking with butter, cream, or high-fat meats.  Meal planning  · Eat meals and snacks regularly, preferably at the same times every day. Avoid going long periods of time without eating.  · Eat foods that are high in fiber, such as fresh fruits, vegetables, beans, and whole grains. Talk with your dietitian about how many servings of carbs you can eat at each meal.  · Eat 4-6 oz (112-168 g) of lean protein each day, such as lean meat, chicken, " fish, eggs, or tofu. One ounce (oz) of lean protein is equal to:  ? 1 oz (28 g) of meat, chicken, or fish.  ? 1 egg.  ? ¼ cup (62 g) of tofu.  · Eat some foods each day that contain healthy fats, such as avocado, nuts, seeds, and fish.  What foods should I eat?  Fruits  Berries. Apples. Oranges. Peaches. Apricots. Plums. Grapes. [place]. Papaya. Pomegranate. Kiwi. Cherries.  Vegetables  Lettuce. Spinach. Leafy greens, including kale, chard, mike greens, and mustard greens. Beets. Cauliflower. Cabbage. Broccoli. Carrots. Green beans. Tomatoes. Peppers. Onions. Cucumbers. [place] sprouts.  Grains  Whole grains, such as whole-wheat or whole-grain bread, crackers, tortillas, cereal, and pasta. Unsweetened oatmeal. Quinoa. Brown or wild rice.  Meats and other proteins  Seafood. Poultry without skin. Lean cuts of poultry and beef. Tofu. Nuts. Seeds.  Dairy  Low-fat or fat-free dairy products such as milk, yogurt, and cheese.  The items listed above may not be a complete list of foods and beverages you can eat. Contact a dietitian for more information.  What foods should I avoid?  Fruits  Fruits canned with syrup.  Vegetables  Canned vegetables. Frozen vegetables with butter or cream sauce.  Grains  Refined white flour and flour products such as bread, pasta, snack foods, and cereals. Avoid all processed foods.  Meats and other proteins  Fatty cuts of meat. Poultry with skin. Breaded or fried meats. Processed meat. Avoid saturated fats.  Dairy  Full-fat yogurt, cheese, or milk.  Beverages  Sweetened drinks, such as soda or iced tea.  The items listed above may not be a complete list of foods and beverages you should avoid. Contact a dietitian for more information.  Questions to ask a health care provider  · Do I need to meet with a diabetes educator?  · Do I need to meet with a dietitian?  · What number can I call if I have questions?  · When are the best times to check my blood glucose?  Where to find more  information:  · American Diabetes Association: diabetes.org  · Academy of Nutrition and Dietetics: www.eatright.org  · National [place] of Diabetes and Digestive and Kidney Diseases: www.niddk.nih.gov  · Association of Diabetes Care and Education Specialists: www.diabeteseducator.org  Summary  · It is important to have healthy eating habits because your blood sugar (glucose) levels are greatly affected by what you eat and drink.  · A healthy meal plan will help you control your blood glucose and maintain a healthy lifestyle.  · Your health care provider may recommend that you work with a dietitian to make a meal plan that is best for you.  · Keep in mind that carbohydrates (carbs) and alcohol have immediate effects on your blood glucose levels. It is important to count carbs and to use alcohol carefully.  This information is not intended to replace advice given to you by your health care provider. Make sure you discuss any questions you have with your health care provider.  Document Revised: 11/24/2020 Document Reviewed: 11/24/2020  [employer] Patient Education © 2021 [employer]

## 2021-10-23 NOTE — PROGRESS NOTES
"  Subjective    Brenda Garzon is a 77 y.o. female. she is here today for follow-up.    Diabetes   Pertinent negatives for hypoglycemia include no confusion, dizziness, headaches, pallor or tremors. Pertinent negatives for diabetes include no chest pain, no fatigue, no polydipsia, no polyphagia, no polyuria and no weakness.          Primary Care Provider     CHEYENNE Lerma     Duration since 1978     Timing - Diabetes is Constant     Quality -  needs improvement,   Lab Results   Component Value Date    HGBA1C 8.3 (H) 04/17/2018          Severity -  severe     Complications - hypoglycemia in the past       Alleviating Factors: Compliance       Side Effects  none     Current diet  in general, a \"healthy\" diet       Current exercise walking     Current monitoring regimen: home blood tests - 3 times daily  Home blood sugar records:     No longer having lows    Waking up at goal -- 129          Hypoglycemia none since last visit              The following portions of the patient's history were reviewed and updated as appropriate:   Past Medical History:   Diagnosis Date   • Chest pain    • Essential hypertension    • Essential hypertension     unspecified   • GERD (gastroesophageal reflux disease)    • Hiatal hernia    • Hyperlipidemia    • Hypertensive disorder    • Irregular heartbeat    • Multinodular goiter    • Neck pain    • Neurologic disorder associated with diabetes mellitus    • Postmenopausal state    • Thyroid nodule    • Type 2 diabetes mellitus    • Type 2 diabetes mellitus, uncontrolled    • Urinary tract infectious disease    • Vitamin D deficiency      Past Surgical History:   Procedure Laterality Date   • COLONOSCOPY  11/08/2013    1 polyp in ascending colon; removed by snare cautery polypectomy. Internal & external hemorrhoids found.   • LEG SURGERY Right    • PARTIAL HYSTERECTOMY     • UPPER GASTROINTESTINAL ENDOSCOPY  11/08/2013    Esophageal stricture present. Dilatation performed. Hiatus hernia " in [facility]. Gastritis in stomach. Biopsy taken. Normal duodenum. Biopsy taken.     Family History   Problem Relation Age of Onset   • Hypertension Mother    • Stroke Mother    • Heart attack Father    • Heart disease Father    • Diabetes Sister    • Diabetes Brother      OB History     No data available        Current Outpatient Prescriptions   Medication Sig Dispense Refill   • ACCU-CHEK YUSRA PLUS test strip USE AS DIRECTED 200 each 1   • ACCU-CHEK FASTCLIX LANCETS misc USE AS DIRECTED 200 each 1   • amLODIPine (NORVASC) 10 MG tablet Take 1 tablet by mouth Daily. 90 tablet 1   • aspirin 81 MG tablet Take 81 mg by mouth daily.     • atorvastatin (LIPITOR) 20 MG tablet Take 1 tablet by mouth  every night 90 tablet 1   • diclofenac (VOLTAREN) 75 MG EC tablet Take 1 tablet by mouth two  times daily 180 tablet 1   • Dulaglutide 0.75 MG/0.5ML solution pen-injector Inject 0.75 mg under the skin 1 (One) Time Per Week. 4 pen 11   • gabapentin (NEURONTIN) 300 MG capsule Take 1 capsule by mouth Every Night. 90 capsule 3   • Glucose 15 GM/32ML gel Take 32 mL by mouth 1 (One) Time As Needed (hypoglycemia). 96 mL 11   • Insulin Glargine (TOUJEO SOLOSTAR) 300 UNIT/ML solution pen-injector Inject 20 Units under the skin every night at bedtime. 2 pen 11   • losartan (COZAAR) 50 MG tablet TAKE 1 TABLET BY MOUTH TWO  TIMES DAILY 180 tablet 1   • meclizine (ANTIVERT) 25 MG tablet Take 0.5-1 tablets by mouth 3 (Three) Times a Day As Needed for dizziness. 90 tablet 0   • metaxalone (SKELAXIN) 800 MG tablet Take 400-800 mg by mouth 3 (three) times a day as needed for muscle spasms.     • metFORMIN ER (GLUCOPHAGE XR) 500 MG 24 hr tablet 1 tablet with supper 30 tablet 11   • omeprazole (priLOSEC) 40 MG capsule TAKE 1 CAPSULE BY MOUTH  DAILY 90 capsule 1   • vitamin D (ERGOCALCIFEROL) 24618 units capsule capsule Take 1 capsule by mouth Every 7 (Seven) Days. 5 capsule 2     No current facility-administered medications for this visit.   "    Allergies   Allergen Reactions   • Codeine Other (See Comments)     Makes patient very sleepy      Social History     Social History   • Marital status:      Social History Main Topics   • Smoking status: Never Smoker   • Smokeless tobacco: Never Used   • Alcohol use No   • Drug use: No   • Sexual activity: Defer     Other Topics Concern   • Not on file       Review of Systems  Review of Systems   Constitutional: Negative for activity change, appetite change, diaphoresis and fatigue.   HENT: Negative for congestion, dental problem, facial swelling, sneezing, sore throat, tinnitus, trouble swallowing and voice change.    Eyes: Negative for photophobia, pain, discharge, redness, itching and visual disturbance.   Respiratory: Negative for apnea, cough, choking, chest tightness and shortness of breath.    Cardiovascular: Negative for chest pain, palpitations and leg swelling.   Gastrointestinal: Negative for abdominal distention, abdominal pain, constipation, diarrhea, nausea and vomiting.   Endocrine: Negative for cold intolerance, heat intolerance, polydipsia, polyphagia and polyuria.   Genitourinary: Negative for difficulty urinating, dysuria, frequency, hematuria and urgency.   Musculoskeletal: Negative for arthralgias, back pain, gait problem, joint swelling, myalgias, neck pain and neck stiffness.   Skin: Negative for color change, pallor, rash and wound.   Allergic/Immunologic: Negative for environmental allergies.   Neurological: Negative for dizziness, tremors, weakness, light-headedness, numbness and headaches.   Hematological: Negative for adenopathy. Does not bruise/bleed easily.   Psychiatric/Behavioral: Negative for agitation, behavioral problems, confusion and sleep disturbance.        Objective    /78 (BP Location: Left arm, Patient Position: Sitting, Cuff Size: Large Adult)   Pulse 91   Ht 149.9 cm (59\")   Wt 53.7 kg (118 lb 6.4 oz)   LMP  (LMP Unknown)   SpO2 98%   BMI 23.91 " kg/m²   Physical Exam   Constitutional: She is oriented to person, place, and time. She appears well-developed and well-nourished. No distress.   HENT:   Head: Normocephalic and atraumatic.   Right Ear: External ear normal.   Left Ear: External ear normal.   Nose: Nose normal.   Eyes: Conjunctivae and EOM are normal. Pupils are equal, round, and reactive to light.   Neck: Normal range of motion. Neck supple. No tracheal deviation present. No thyromegaly present.   Cardiovascular: Normal rate, regular rhythm and normal heart sounds.    No murmur heard.  Pulmonary/Chest: Effort normal and breath sounds normal. No respiratory distress. She has no wheezes.   Abdominal: Soft. Bowel sounds are normal. There is no tenderness. There is no rebound and no guarding.   Musculoskeletal: Normal range of motion. She exhibits no edema, tenderness or deformity.   Neurological: She is alert and oriented to person, place, and time. No cranial nerve deficit.   Skin: Skin is warm and dry. No rash noted.   Psychiatric: She has a normal mood and affect. Her behavior is normal. Judgment and thought content normal.       Lab Review  Glucose (mg/dL)   Date Value   04/17/2018 62   03/12/2018 117 (H)   09/18/2017 38 (C)     Sodium (mmol/L)   Date Value   04/17/2018 147 (H)   03/12/2018 142   09/18/2017 141     Potassium (mmol/L)   Date Value   04/17/2018 5.3 (H)   03/12/2018 4.7   09/18/2017 4.6     Chloride (mmol/L)   Date Value   04/17/2018 104   03/12/2018 101   09/18/2017 104     CO2 (mmol/L)   Date Value   04/17/2018 27.0   03/12/2018 25.0   09/18/2017 26.0     BUN (mg/dL)   Date Value   04/17/2018 23 (H)   03/12/2018 26 (H)   09/18/2017 37 (H)     Creatinine (mg/dL)   Date Value   04/17/2018 0.93   03/12/2018 0.90   09/18/2017 1.42 (H)     Hemoglobin A1C (%)   Date Value   04/17/2018 8.3 (H)   03/12/2018 8.5 (H)   09/18/2017 7.1 (H)     Triglycerides   Date Value   04/17/2018 116 mg/dL   09/18/2017 124 mg/dL   09/12/2016 161 mg/dl    09/08/2014 141 mg/dl     LDL Cholesterol    Date Value   04/17/2018 111 mg/dL   09/18/2017 129 mg/dL   09/12/2016 126 mg/dl   09/08/2014 104 mg/dl       Assessment/Plan      1. Mixed diabetic hyperlipidemia associated with type 2 diabetes mellitus    2. Type 2 diabetes mellitus with hypoglycemia without coma, with long-term current use of insulin    3. Hypertension associated with diabetes    4. Multinodular goiter    5. Vitamin D deficiency    .    Medications prescribed:  Outpatient Encounter Prescriptions as of 4/26/2018   Medication Sig Dispense Refill   • ACCU-CHEK YUSRA PLUS test strip USE AS DIRECTED 200 each 1   • ACCU-CHEK FASTCLIX LANCETS misc USE AS DIRECTED 200 each 1   • amLODIPine (NORVASC) 10 MG tablet Take 1 tablet by mouth Daily. 90 tablet 1   • aspirin 81 MG tablet Take 81 mg by mouth daily.     • atorvastatin (LIPITOR) 20 MG tablet Take 1 tablet by mouth  every night 90 tablet 1   • diclofenac (VOLTAREN) 75 MG EC tablet Take 1 tablet by mouth two  times daily 180 tablet 1   • Dulaglutide 0.75 MG/0.5ML solution pen-injector Inject 0.75 mg under the skin 1 (One) Time Per Week. 4 pen 11   • gabapentin (NEURONTIN) 300 MG capsule Take 1 capsule by mouth Every Night. 90 capsule 3   • Glucose 15 GM/32ML gel Take 32 mL by mouth 1 (One) Time As Needed (hypoglycemia). 96 mL 11   • Insulin Glargine (TOUJEO SOLOSTAR) 300 UNIT/ML solution pen-injector Inject 20 Units under the skin every night at bedtime. 2 pen 11   • losartan (COZAAR) 50 MG tablet TAKE 1 TABLET BY MOUTH TWO  TIMES DAILY 180 tablet 1   • meclizine (ANTIVERT) 25 MG tablet Take 0.5-1 tablets by mouth 3 (Three) Times a Day As Needed for dizziness. 90 tablet 0   • metaxalone (SKELAXIN) 800 MG tablet Take 400-800 mg by mouth 3 (three) times a day as needed for muscle spasms.     • metFORMIN ER (GLUCOPHAGE XR) 500 MG 24 hr tablet 1 tablet with supper 30 tablet 11   • omeprazole (priLOSEC) 40 MG capsule TAKE 1 CAPSULE BY MOUTH  DAILY 90 capsule 1    • vitamin D (ERGOCALCIFEROL) 55093 units capsule capsule Take 1 capsule by mouth Every 7 (Seven) Days. 5 capsule 2     No facility-administered encounter medications on file as of 4/26/2018.        Orders placed during this encounter include:  Orders Placed This Encounter   Procedures   • POC Glucose     Glycemic Management:       Lab Results   Component Value Date    HGBA1C 8.3 (H) 04/17/2018    HGBA1C 8.5 (H) 03/12/2018    HGBA1C 7.1 (H) 09/18/2017     Lab Results   Component Value Date    MICROALBUR 4.4 04/17/2018    CREATININE 0.93 04/17/2018     Lab Results   Component Value Date    GLUCOSE 62 04/17/2018    GLUCOSE 117 (H) 03/12/2018    GLUCOSE 38 (C) 09/18/2017     Aic elevated but fasting low    I would rather have an Aic of 8.3 without lows than a 7.1 with lows     We need better prandial control     Toujeo 20 , fasting low on April 17 fasting test - change to 15    trulicity 0.75 mg weekly - increase to 1.5 mg weekly     metformin 500 mg w supper - change to w bkfast and w supper    Next appt consider soliqua instead due to cost            Lipid Management     On lipitor 20 mg qhs      Blood Pressure Management:      Was on losartan 50 , due to elevated K she was changed to norvasc    Instead of 10 , I suggest 5 mg              Microvascular Complication Monitoring:       Eye Exam Evaluation, within 1 year      -----------     Last Microalbumin-Proteinuria Assessment     GFR is 52  Microalbuminuria         -----------        Neuropathy, none              Bone Health     Lab Results   Component Value Date    HSEY31OQ 40.7 04/17/2018    OKUB91QO 22.5 (L) 03/12/2018    QNYC65TZ 30.3 09/18/2017         Thyroid Health     Lab Results   Component Value Date    TSH 2.070 04/17/2018       h o MNG      Had US and told normal         Other Diabetes Related Aspects         No results found for: [id number]  On b12         4. Follow-up: No Follow-up on file.                    112

## 2021-10-24 NOTE — PROGRESS NOTES
Subjective   Brenda Garzon is a 80 y.o. female.     She presents today for her routine follow up on chronic medical problems.  Her BP is well controlled today in the office.  She reports her blood sugars have been fairly well controlled.  No recent episodes of hypoglycemia.  She is due for repeat A1c.  She is doing well on her statin therapy for cholesterol management without side effects.  She reports that her arthritis symptoms have been fairly well controlled on the Tylenol Arthritis that she is currently taking.  She reports that she has been seeing nephrology.  She reports that her heartburn symptoms have been fairly well controlled.  Her routine fasting labs are up-to-date.  She is without any other new complaints today in the office.  She does need refills on her routine daily medications.    Hypertension  This is a chronic problem. The current episode started more than 1 year ago. The problem has been resolved since onset. The problem is controlled. Pertinent negatives include no anxiety or sweats. There are no associated agents to hypertension. Risk factors for coronary artery disease include post-menopausal state, diabetes mellitus, dyslipidemia, family history, sedentary lifestyle and stress. Past treatments include angiotensin blockers. Current antihypertension treatment includes angiotensin blockers. The current treatment provides significant improvement. Compliance problems include diet and exercise.  Hypertensive end-organ damage includes kidney disease. Identifiable causes of hypertension include chronic renal disease.   Diabetes  She presents for her follow-up diabetic visit. She has type 2 diabetes mellitus. Her disease course has been stable. There are no hypoglycemic associated symptoms. Pertinent negatives for hypoglycemia include no sweats. There are no hypoglycemic complications. Symptoms are stable. There are no diabetic complications. Risk factors for coronary artery disease include  diabetes mellitus, dyslipidemia, family history, post-menopausal, sedentary lifestyle and stress. Current diabetic treatment includes oral agent (triple therapy) and insulin injections. She is compliant with treatment most of the time. Her weight is stable. She is following a generally healthy diet. She has not had a previous visit with a dietitian. She rarely participates in exercise. An ACE inhibitor/angiotensin II receptor blocker is being taken. She does not see a podiatrist.Eye exam is not current.   Arthritis  Presents for follow-up visit. She complains of pain and joint warmth. The symptoms have been stable. Associated symptoms include pain at night and pain while resting. Pertinent negatives include no dry eyes, dysuria, Raynaud's syndrome or uveitis. Compliance with total regimen is %. Compliance with medications is %.        The following portions of the patient's history were reviewed and updated as appropriate: allergies, current medications, past family history, past medical history, past social history, past surgical history and problem list.    Review of Systems   Constitutional: Negative.    HENT: Negative.    Eyes: Negative.    Respiratory: Negative.    Cardiovascular: Negative.    Gastrointestinal: Negative.    Genitourinary: Negative for dysuria.   Musculoskeletal: Positive for arthritis.   Skin: Negative.    Allergic/Immunologic: Negative.    Neurological: Negative.    Hematological: Negative.    Psychiatric/Behavioral: Negative.        Objective   Physical Exam  Vitals reviewed.   Constitutional:       General: She is not in acute distress.     Appearance: She is well-developed. She is not diaphoretic.   HENT:      Head: Normocephalic.      Right Ear: External ear normal.      Left Ear: External ear normal.      Nose: Nose normal.   Eyes:      Pupils: Pupils are equal, round, and reactive to light.   Neck:      Thyroid: No thyromegaly.      Vascular: No JVD.   Cardiovascular:       Rate and Rhythm: Normal rate and regular rhythm.      Heart sounds: No murmur heard.  No friction rub. No gallop.    Pulmonary:      Effort: Pulmonary effort is normal. No respiratory distress.      Breath sounds: Normal breath sounds. No wheezing or rales.   Musculoskeletal:         General: Normal range of motion.      Cervical back: Normal range of motion and neck supple.   Skin:     General: Skin is warm and dry.      Coloration: Skin is not pale.      Findings: No erythema or rash.   Neurological:      Mental Status: She is alert and oriented to person, place, and time.   Psychiatric:         Behavior: Behavior normal.         Thought Content: Thought content normal.         Judgment: Judgment normal.           Assessment/Plan   Diagnoses and all orders for this visit:    1. Type 2 diabetes mellitus with hypoglycemia without coma, with long-term current use of insulin (HCC) (Primary)  -     POC Glycosylated Hemoglobin (Hb A1C)  -     Dulaglutide 1.5 MG/0.5ML solution pen-injector; Inject 1.5 mg under the skin into the appropriate area as directed 1 (One) Time Per Week.  Dispense: 12 pen; Refill: 3  -     glucose blood (Accu-Chek Ava Plus) test strip; 1 each by Other route See Admin Instructions. Use as instructed  Dispense: 200 each; Refill: 5  -     Insulin Glargine, 1 Unit Dial, (TOUJEO) 300 UNIT/ML solution pen-injector injection; Inject 15 Units under the skin into the appropriate area as directed every night at bedtime.  Dispense: 3 pen; Refill: 3  -     Insulin Pen Needle (Pen Needles) 32G X 4 MM misc; 1 each Daily.  Dispense: 200 each; Refill: 4  -     metFORMIN ER (Glucophage XR) 500 MG 24 hr tablet; 1 with bkfast and 1 w supper  Dispense: 180 tablet; Refill: 3    2. Needs flu shot  -     Fluzone High-Dose 65+yrs (6934-7889)    3. Dyslipidemia  -     atorvastatin (LIPITOR) 20 MG tablet; Take 1 tablet by mouth Every Night.  Dispense: 90 tablet; Refill: 3    4. Essential hypertension  -     losartan  (COZAAR) 50 MG tablet; Take 1 tablet by mouth 2 (Two) Times a Day.  Dispense: 180 tablet; Refill: 3  -     verapamil SR (CALAN-SR) 120 MG CR tablet; Take 1 tablet by mouth Every Night.  Dispense: 90 tablet; Refill: 3    5. Thyroid nodule    6. Vitamin D deficiency  -     vitamin D (ERGOCALCIFEROL) 1.25 MG (92693 UT) capsule capsule; Take 1 capsule by mouth Every 7 (Seven) Days.  Dispense: 12 capsule; Refill: 3    7. Decreased GFR    8. Chronic arthritis  -     acetaminophen (Tylenol 8 Hour Arthritis Pain) 650 MG 8 hr tablet; Take 2 tablets by mouth Every 8 (Eight) Hours As Needed for Mild Pain .  Dispense: 120 tablet; Refill: 5    9. Hypoglycemia  -     Glucose 15 GM/32ML gel; Take 32 mL by mouth 1 (One) Time As Needed (hypoglycemia).  Dispense: 96 mL; Refill: 11    10. Dizziness    11. Heartburn  -     famotidine (Pepcid) 40 MG tablet; Take 1 tablet by mouth Daily.  Dispense: 90 tablet; Refill: 3  -     sucralfate (Carafate) 1 g tablet; Take 1 tablet by mouth 4 (Four) Times a Day Before Meals & at Bedtime As Needed (acid reflux).  Dispense: 120 tablet; Refill: 2    12. Chronic nausea  -     sucralfate (Carafate) 1 g tablet; Take 1 tablet by mouth 4 (Four) Times a Day Before Meals & at Bedtime As Needed (acid reflux).  Dispense: 120 tablet; Refill: 2    13. Chronic neck pain  -     tiZANidine (Zanaflex) 4 MG tablet; Take 0.5-1 tablets by mouth At Night As Needed for Muscle Spasms.  Dispense: 91 tablet; Refill: 3    Other orders  -     Accu-Chek FastClix Lancets misc; 1 each 4 (Four) Times a Day With Meals & at Bedtime.  Dispense: 400 each; Refill: 3  -     ferrous sulfate 325 (65 FE) MG EC tablet; Take 1 tablet by mouth 3 (Three) Times a Day With Meals.  Dispense: 90 tablet; Refill: 3               Patient's Body mass index is 21.85 kg/m². indicating that she is within normal range (BMI 18.5-24.9). No BMI management plan needed..    Bedside A1c performed today in the office.  Flu shot given IM in the  office.  Continue current medications.  Follow up as scheduled for routine follow up.  Follow up sooner for problems/concerns.  Patient verbalized understanding and agreement with plan of care.        This document has been electronically signed by CHEYENNE Lerma on October 23, 2021 21:07 CDT

## 2021-12-08 NOTE — PROGRESS NOTES
"Chief Complaint  cough/ coughing up phlem with blood/chest congestion    Subjective          Brenda Garzon presents to [facility]    PCP: CHEYENNE Phillip    CC: \"cough, cold x 3 weeks\"    Symptoms x 3 weeks.  Seen at [facility] on 12-4 and treated with Rocephin and Kenalog injections and started on Zithromax and Prednisone on 12-5, still taking.  Feeling some better, but still not herself.  Daughter is with her today.  Has also had some n/v/d, still eating/drinking and urinating without problems.  No fever.  Has not had Covid vaccine, does not wish to be tested.  Did have some dizziness on and off the last few days, hx of anemia.  Hx of stomach pain since she had cholecystectomy, doesn't feel any different than normal.  Daughter reports mucus is blood tinged at times.  She has not had a CXR.  Diabetic and reports blood sugars have been running a little higher at times due to steroids, but was 87 this AM.  Does not have an inhaler.     Illness  Chronicity: persistent. The current episode started 1 to 4 weeks ago (x 3 weeks). The problem occurs daily. The problem has been gradually improving (mild improvement since treated at [facility] on 12-4). Associated symptoms include abdominal pain, a change in bowel habit (diarrhea), chest pain (tightness at times with cough), coughing (productive of blood tinged mucus), fatigue, nausea and vomiting. Pertinent negatives include no anorexia, arthralgias, chills, congestion (chest), diaphoresis, fever, headaches, joint swelling, myalgias, neck pain, numbness, rash, sore throat, swollen glands, urinary symptoms, vertigo, visual change or weakness. Nothing aggravates the symptoms. Treatments tried: didn't take anything to assist the first 2 weeks, then seen at [facility] and treated with Rocephin, Kenalog, Zithromax, prednisone. The treatment provided mild relief.       Review of Systems " "  Constitutional: Positive for fatigue. Negative for appetite change, chills, diaphoresis and fever.   HENT: Negative for congestion (chest), ear discharge, ear pain, postnasal drip, rhinorrhea, sinus pressure, sinus pain, sneezing, sore throat and trouble swallowing.    Eyes: Negative.    Respiratory: Positive for cough (productive of blood tinged mucus), chest tightness, shortness of breath and wheezing.    Cardiovascular: Positive for chest pain (tightness at times with cough). Negative for palpitations and leg swelling.   Gastrointestinal: Positive for abdominal pain, change in bowel habit (diarrhea), diarrhea, nausea and vomiting. Negative for anorexia and blood in stool.   Genitourinary: Negative.    Musculoskeletal: Negative for arthralgias, joint swelling, myalgias and neck pain.   Skin: Negative.  Negative for rash.   Neurological: Positive for dizziness (at times). Negative for vertigo, weakness, numbness and headaches.        Objective   Vital Signs:   /84 (BP Location: Left arm, Patient Position: Sitting, Cuff Size: Adult)   Pulse 69   Temp 97.3 °F (36.3 °C) (Temporal)   Resp 20   Ht 149.9 cm (59\")   Wt 49 kg (108 lb)   SpO2 98%   BMI 21.81 kg/m²       Physical Exam  Vitals and nursing note reviewed.   Constitutional:       General: She is not in acute distress.     Appearance: Normal appearance. She is not ill-appearing.   HENT:      Head: Normocephalic and atraumatic.      Right Ear: Tympanic membrane and ear canal normal.      Left Ear: Tympanic membrane and ear canal normal.      Nose: Nose normal. No congestion.      Mouth/Throat:      Mouth: Mucous membranes are moist.      Pharynx: Oropharynx is clear. No oropharyngeal exudate or posterior oropharyngeal erythema.   Eyes:      General:         Right eye: No discharge.         Left eye: No discharge.      Conjunctiva/sclera: Conjunctivae normal.   Cardiovascular:      Rate and Rhythm: Normal rate and regular rhythm.      Heart sounds: " Murmur heard.       Pulmonary:      Effort: Pulmonary effort is normal. No respiratory distress.      Breath sounds: Wheezing and rhonchi present. No rales.      Comments: Decreased aeration with tight, congested, slightly wheezy cough noted  Abdominal:      General: Bowel sounds are normal.      Palpations: Abdomen is soft.      Tenderness: There is abdominal tenderness (mild, generalized, no localized pain). There is no right CVA tenderness, left CVA tenderness, guarding or rebound.   Musculoskeletal:      Cervical back: Neck supple. No tenderness.   Lymphadenopathy:      Cervical: No cervical adenopathy.   Skin:     General: Skin is warm and dry.   Neurological:      General: No focal deficit present.      Mental Status: She is alert and oriented to person, place, and time.   Psychiatric:         Mood and Affect: Mood normal.         Thought Content: Thought content normal.          Result Review :     Common labs    Common Labsle 6/8/21 6/8/21 6/8/21 9/7/21 10/25/21    1012 1012 1012     Glucose   121 (A)     BUN   22     Creatinine   0.97     eGFR Non African Am   55 (A)     Sodium   139     Potassium   4.6     Chloride   103     Calcium   9.3     Albumin   4.00     Total Bilirubin   0.2     Alkaline Phosphatase   91     AST (SGOT)   21     ALT (SGPT)   14     WBC 6.49       Hemoglobin 11.0 (A)   10.1 (A)    Hematocrit 34.3   31.1 (A)    Platelets 261       Hemoglobin A1C  6.90 (A)   7.4   (A) Abnormal value                      Assessment and Plan    Diagnoses and all orders for this visit:    1. Lower respiratory infection (e.g., bronchitis, pneumonia, pneumonitis, pulmonitis) (Primary)  -     XR Chest PA & Lateral  -     albuterol sulfate  (90 Base) MCG/ACT inhaler; Inhale 2 puffs Every 4 (Four) Hours As Needed for Wheezing or Shortness of Air.  Dispense: 18 g; Refill: 0  -     benzonatate (Tessalon Perles) 100 MG capsule; Take 1 capsule by mouth 3 (Three) Times a Day As Needed for Cough.  Dispense:  30 capsule; Refill: 0    2. Nausea vomiting and diarrhea  -     CBC No Differential; Future  -     Comprehensive metabolic panel; Future  -     ondansetron ODT (Zofran ODT) 4 MG disintegrating tablet; Place 1 tablet on the tongue Every 8 (Eight) Hours As Needed for Nausea or Vomiting.  Dispense: 15 tablet; Refill: 0    3. Type 2 diabetes mellitus with hypoglycemia without coma, with long-term current use of insulin (HCC)      Continue with Prednisone, Zithromax to completion  Rx for Albuterol, Tessalon perles provided  Rx for Zofran PRN provided  Stay well hydrated.  [place] diet.     CXR, CBC, CMP today--will notify with results when available  Declines Covid testing    See PCP or RTC if symptoms persist/worsen  See PCP for routine f/u visit and management of chronic medical conditions      This document has been electronically signed by CHEYENNE Bermudez on December 8, 2021 10:21 CST,.    \plain

## 2021-12-09 NOTE — PROGRESS NOTES
Daughter of pt (sotero) notified of results, she states she didn't know about her having COPD. She will bring her in next Wednesday to have labs repeated.

## 2021-12-15 NOTE — PROGRESS NOTES
"Chief Complaint  no energy x begining of december Subjective          Brenda Garzon presents to [facility]    PCP: CHEYENNE Phillip    CC: \"no energy\"      Recent URI, feeling better, but still no energy or appetite.  Down 2 pounds in the last week.  Has been on Boost in the past, but stopped because she thought she gained too much weight.  Diabetic, glucose last night was 167. Had been a little elevated over the last week as she was taking prednisone for her URI.  Hx of Vit D deficiency, iron deficiency.  Taking Vit D weekly, but reports she has also only been taking iron once weekly.        Fatigue  This is a new problem. The current episode started 1 to 4 weeks ago (the last few weeks). The problem occurs constantly. The problem has been unchanged. Associated symptoms include coughing (only occasional) and fatigue. Pertinent negatives include no abdominal pain, anorexia, arthralgias, change in bowel habit, chest pain, chills, congestion, diaphoresis, fever, headaches, joint swelling, myalgias, nausea, neck pain, numbness, rash, sore throat, swollen glands, urinary symptoms, vertigo, visual change, vomiting or weakness. Exacerbated by: had recent URI infection, was negative for Covid, feeling better with cough/congestion, but just still doesn't have much energy or an appetitie. The treatment provided no relief.       Review of Systems   Constitutional: Positive for appetite change and fatigue. Negative for chills, diaphoresis and fever. Unexpected weight change:  down 2 pounds in the last week.   HENT: Negative for congestion, ear discharge, ear pain, rhinorrhea, sinus pressure, sinus pain, sneezing, sore throat and trouble swallowing.    Eyes: Negative.    Respiratory: Positive for cough (only occasional). Negative for chest tightness, shortness of breath and wheezing.    Cardiovascular: Negative.  Negative for chest pain. " "  Gastrointestinal: Negative.  Negative for abdominal pain, anorexia, change in bowel habit, nausea and vomiting.   Genitourinary: Negative.    Musculoskeletal: Negative for arthralgias, joint swelling, myalgias and neck pain.   Skin: Negative for rash.   Neurological: Negative for dizziness, vertigo, weakness, light-headedness, numbness and headaches.        Objective   Vital Signs:   /70 (BP Location: Right arm, Patient Position: Sitting, Cuff Size: Adult)   Pulse 94   Temp 97.3 °F (36.3 °C) (Temporal)   Resp 20   Ht 149.9 cm (59\")   Wt 47.8 kg (105 lb 6 oz)   SpO2 97%   BMI 21.28 kg/m²       Physical Exam  Vitals and nursing note reviewed.   Constitutional:       General: She is not in acute distress.     Appearance: Normal appearance. She is not ill-appearing.   HENT:      Head: Normocephalic and atraumatic.      Right Ear: Ear canal normal. Tympanic membrane is not erythematous ( dull).      Left Ear: Ear canal normal. Tympanic membrane is not erythematous (dull).      Nose: Nose normal.      Right Sinus: No maxillary sinus tenderness or frontal sinus tenderness.      Left Sinus: No maxillary sinus tenderness or frontal sinus tenderness.      Mouth/Throat:      Mouth: Mucous membranes are moist.      Pharynx: Oropharynx is clear. No pharyngeal swelling, oropharyngeal exudate or posterior oropharyngeal erythema.   Eyes:      General:         Right eye: No discharge.         Left eye: No discharge.      Conjunctiva/sclera: Conjunctivae normal.      Comments: Conjunctiva pale bilaterally   Cardiovascular:      Rate and Rhythm: Normal rate and regular rhythm.   Pulmonary:      Effort: Pulmonary effort is normal. No respiratory distress.      Breath sounds: No wheezing, rhonchi or rales.      Comments: Diminished throughout   Musculoskeletal:      Cervical back: Neck supple. No tenderness.   Lymphadenopathy:      Cervical: No cervical adenopathy.   Skin:     General: Skin is warm and dry. "   Neurological:      General: No focal deficit present.      Mental Status: She is alert and oriented to person, place, and time.   Psychiatric:         Mood and Affect: Mood normal.         Thought Content: Thought content normal.          Result Review :     Common labs    Common Labsle 9/7/21 10/25/21 12/8/21 12/8/21      1025 1025   Glucose    238 (A)   BUN    29 (A)   Creatinine    1.02 (A)   eGFR Non African Am    52 (A)   Sodium    136   Potassium    4.5   Chloride    100   Calcium    10.1   Albumin    4.00   Total Bilirubin    0.5   Alkaline Phosphatase    147 (A)   AST (SGOT)    17   ALT (SGPT)    21   WBC   17.59 (A)    Hemoglobin 10.1 (A)  9.9 (A)    Hematocrit 31.1 (A)  30.6 (A)    Platelets   384    Hemoglobin A1C  7.4     (A) Abnormal value                Covid Tests    Common Labsle 6/25/21   COVID19 Not Detected                     Assessment and Plan    Diagnoses and all orders for this visit:    1. Iron deficiency anemia, unspecified iron deficiency anemia type (Primary)  -     Iron Profile    2. No energy  -     Iron Profile      CBC, CMP was already ordered for repeat and this was drawn this AM, Will add iron profile as well  Encouraged to take ferrous sulfate TID, every day  Stay well hydrated  Rest  Glucerna shakes once daily until appetite improves    See PCP or RTC if symptoms persist/worsen  See PCP for routine f/u visit and management of chronic medical conditions      This document has been electronically signed by CHEYENNE Bermudez on December 15, 2021 10:49 CST,.

## 2021-12-16 NOTE — PROGRESS NOTES
Pt notified of results, pt states she needs the iron pills sent to [facility] because she doesn't have many left.

## 2021-12-27 NOTE — TELEPHONE ENCOUNTER
Patient's friend called trying to get patient in. Discussed that she is not on her  Verbal release form yet and that she would have to be on there or have patient approve speaking to her in regards to anything. She did however say she was having a hard time getting a hold of patient over the phone and that patient had been complaining of pain and lying around.

## 2022-01-01 ENCOUNTER — TELEPHONE (OUTPATIENT)
Dept: FAMILY MEDICINE CLINIC | Facility: CLINIC | Age: 82
End: 2022-01-01

## 2022-01-06 NOTE — TELEPHONE ENCOUNTER
Patient called to reschedule appointment. Let patient know we had spoken to her daughter and we had her rescheduled for February. Patient verbalized understanding.

## 2024-01-18 NOTE — TELEPHONE ENCOUNTER
From: Merlyn Baldwin  To: Sherice Cohn  Sent: 1/18/2024 11:28 AM CST  Subject: Urgent    Hello, I have a appointment today at 2:45pm with you. Is there away we can request my foot problem records from [facility] ([facility]) located [address] [address]?    Tried calling to confirm appointment and to let patient know in the event of bad weather will call to change to telephone or reschedule No answer no voicemail   HUB to read

## 2024-03-07 NOTE — TELEPHONE ENCOUNTER
----- Message from CHEYENNE Maddox sent at 3/13/2018  7:52 AM CDT -----  Thyroid normal, vitamin d low if not taking needs 50,000 units weekly  
Detail Level: Detailed
General Sunscreen Counseling: I recommended a broad spectrum sunscreen with a SPF of 30 or higher.  I explained that SPF 30 sunscreens block approximately 95 percent of the sun's harmful rays.  Sunscreens should be applied at least 15 minutes prior to expected sun exposure and then every 2 hours after that as long as sun exposure continues. If swimming or exercising sunscreen should be reapplied every 45 minutes to an hour after getting wet or sweating.  One ounce, or the equivalent of a shot glass full of sunscreen, is adequate to protect the skin not covered by a bathing suit. I also recommended a lip balm with a sunscreen as well. Sun protective clothing can be used in lieu of sunscreen but must be worn the entire time you are exposed to the sun's rays.

## (undated) DEVICE — CANN SMPL SOFTECH BIFLO ETCO2 A/M 7FT

## (undated) DEVICE — BITEBLOCK ENDO W/STRAP 60F A/ LF DISP

## (undated) DEVICE — SINGLE-USE BIOPSY FORCEPS: Brand: RADIAL JAW 4